# Patient Record
Sex: FEMALE | Race: WHITE | NOT HISPANIC OR LATINO | Employment: OTHER | URBAN - METROPOLITAN AREA
[De-identification: names, ages, dates, MRNs, and addresses within clinical notes are randomized per-mention and may not be internally consistent; named-entity substitution may affect disease eponyms.]

---

## 2017-01-17 RX ORDER — OMEPRAZOLE 20 MG/1
20 CAPSULE, DELAYED RELEASE ORAL DAILY
Qty: 90 CAPSULE | Refills: 2 | Status: SHIPPED | OUTPATIENT
Start: 2017-01-17 | End: 2018-01-15 | Stop reason: SDUPTHER

## 2017-02-24 ENCOUNTER — OFFICE VISIT (OUTPATIENT)
Dept: FAMILY MEDICINE | Facility: CLINIC | Age: 82
End: 2017-02-24
Payer: MEDICARE

## 2017-02-24 VITALS
DIASTOLIC BLOOD PRESSURE: 70 MMHG | OXYGEN SATURATION: 98 % | BODY MASS INDEX: 22.22 KG/M2 | WEIGHT: 146.63 LBS | RESPIRATION RATE: 18 BRPM | SYSTOLIC BLOOD PRESSURE: 124 MMHG | HEIGHT: 68 IN | HEART RATE: 68 BPM

## 2017-02-24 DIAGNOSIS — K21.9 GASTROESOPHAGEAL REFLUX DISEASE WITHOUT ESOPHAGITIS: ICD-10-CM

## 2017-02-24 DIAGNOSIS — Z86.010 HX OF COLONIC POLYPS: ICD-10-CM

## 2017-02-24 DIAGNOSIS — Z85.3 HISTORY OF BREAST CANCER: ICD-10-CM

## 2017-02-24 DIAGNOSIS — F41.9 ANXIETY: ICD-10-CM

## 2017-02-24 DIAGNOSIS — K57.30 DIVERTICULOSIS OF LARGE INTESTINE WITHOUT HEMORRHAGE: ICD-10-CM

## 2017-02-24 DIAGNOSIS — Z00.00 ENCOUNTER FOR PREVENTIVE HEALTH EXAMINATION: Primary | ICD-10-CM

## 2017-02-24 DIAGNOSIS — M72.0 DUPUYTREN'S CONTRACTURE OF RIGHT HAND: ICD-10-CM

## 2017-02-24 PROCEDURE — 99499 UNLISTED E&M SERVICE: CPT | Mod: S$GLB,,, | Performed by: NURSE PRACTITIONER

## 2017-02-24 PROCEDURE — G0439 PPPS, SUBSEQ VISIT: HCPCS | Mod: S$GLB,,, | Performed by: NURSE PRACTITIONER

## 2017-02-24 PROCEDURE — 99999 PR PBB SHADOW E&M-EST. PATIENT-LVL III: CPT | Mod: PBBFAC,,, | Performed by: NURSE PRACTITIONER

## 2017-02-24 NOTE — PROGRESS NOTES
"Ladonna St presented for a  Medicare AWV and comprehensive Health Risk Assessment today. The following components were reviewed and updated:    · Medical history  · Family History  · Social history  · Allergies and Current Medications  · Health Risk Assessment  · Health Maintenance  · Care Team     ** See Completed Assessments for Annual Wellness Visit within the encounter summary.**  Patient offers no complaints. She is seen regularly by her primary care physician, Dr. Houser.   She has regular dental and eye exams.  She lives at home with her . She is the primary caregiver for her  who is currently receiving treatment for cancer. She claims to eat a healthy diet. She is active and continues to volunteer her time also.  She is up to date with all immunizations.     The following assessments were completed:  · Living Situation  · CAGE  · Depression Screening  · Timed Get Up and Go  · Whisper Test  · Cognitive Function Screening  · Nutrition Screening  · ADL Screening  · PAQ Screening    Vitals:    02/24/17 0823   BP: 124/70   BP Location: Left arm   Patient Position: Sitting   BP Method: Manual   Pulse: 68   Resp: 18   SpO2: 98%   Weight: 66.5 kg (146 lb 9.7 oz)   Height: 5' 8" (1.727 m)     Body mass index is 22.29 kg/(m^2).  Physical Exam   Constitutional: She is oriented to person, place, and time. She appears well-developed and well-nourished. No distress.   HENT:   Head: Normocephalic and atraumatic.   Right Ear: External ear normal.   Left Ear: External ear normal.   Nose: Nose normal.   Mouth/Throat: Oropharynx is clear and moist. No oropharyngeal exudate.   Eyes: Conjunctivae and EOM are normal. Pupils are equal, round, and reactive to light. No scleral icterus.   Neck: Normal range of motion. Neck supple. No thyromegaly present.   Cardiovascular: Normal rate, regular rhythm, normal heart sounds and intact distal pulses.    Pulmonary/Chest: Effort normal and breath sounds normal. No " respiratory distress. She has no wheezes. She exhibits no tenderness.   Abdominal: Soft. Bowel sounds are normal. She exhibits no distension and no mass. There is no tenderness. There is no guarding.   Musculoskeletal: Normal range of motion. She exhibits no edema or deformity.   Lymphadenopathy:     She has no cervical adenopathy.   Neurological: She is alert and oriented to person, place, and time.   Skin: Skin is warm and dry. She is not diaphoretic. No erythema.   Psychiatric: She has a normal mood and affect. Her behavior is normal. Judgment and thought content normal.   Nursing note and vitals reviewed.        Diagnoses and health risks identified today and associated recommendations/orders:    1. Encounter for preventive health examination    2. Anxiety  Stable and controlled. Continue current treatment plan as previously prescribed with your PCP.     3. Dupuytren's contracture of right hand  Stable and controlled. Continue current treatment plan as previously prescribed with your PCP.     4. Gastroesophageal reflux disease without esophagitis  Stable and controlled. Continue current treatment plan as previously prescribed with your PCP.     5. Diverticulosis of large intestine without hemorrhage  Stable and controlled. Continue current treatment plan as previously prescribed with your PCP.     6. Hx of colonic polyps  Stable and controlled. Continue current treatment plan as previously prescribed with your PCP.     7. History of breast cancer  Stable and controlled. Continue current treatment plan as previously prescribed with your PCP.       Provided Ladonna with a 5-10 year written screening schedule and personal prevention plan. Recommendations were developed using the USPSTF age appropriate recommendations. Education, counseling, and referrals were provided as needed. After Visit Summary printed and given to patient which includes a list of additional screenings\tests needed.    Return for Regular  visit with Dr. Houser.    Elda Vidales, KELSIP

## 2017-02-24 NOTE — PATIENT INSTRUCTIONS
Counseling and Referral of Other Preventative  (Italic type indicates deductible and co-insurance are waived)    Patient Name: Ladonna St  Today's Date: 2/24/2017      SERVICE LIMITATIONS RECOMMENDATION    Vaccines    · Pneumococcal (once after 65)    · Influenza (annually)    · Hepatitis B (if medium/high risk)    · Prevnar 13      Hepatitis B medium/high risk factors:       - End-stage renal disease       - Hemophiliacs who received Factor VII or         IX concentrates       - Clients of institutions for the mentally             retarded       - Persons who live in the same house as          a HepB carrier       - Homosexual men       - Illicit injectable drug abusers     Pneumococcal: Done     Influenza: Done     Hepatitis B: N/A     Prevnar 13: Done    Mammogram (biennial age 50-74)  Annually (age 40 or over) Ordered    Pap (up to age 70 and after 70 if unknown history or abnormal study last 10 years)   N/A    Colorectal cancer screening (to age 75)    · Fecal occult blood test (annual)  · Flexible sigmoidoscopy (5y)  · Screening colonoscopy (10y)  · Barium enema  N/A    Diabetes self-management training (no USPSTF recommendations)  Requires referral by treating physician for patient with diabetes or renal disease. 10 hours of initial DSMT sessions of no less than 30 minutes each in a continuous 12-month period. 2 hours of follow-up DSMT in subsequent years. N/A    Bone mass measurements (age 65 & older, biennial)  Requires diagnosis related to osteoporosis or estrogen deficiency. Biennial benefit unless patient has history of long-term glucocorticoid Done    Glaucoma screening (no USPSTF recommendation)  Diabetes mellitus, family history   , age 50 or over    American, age 65 or over N/A    Medical nutrition therapy for diabetes or renal disease (no recommended schedule)  Requires referral by treating physician for patient with diabetes or renal disease or kidney transplant  within the past 3 years.  Can be provided in same year as diabetes self-management training (DSMT), and CMS recommends medical nutrition therapy take place after DSMT. Up to 3 hours for initial year and 2 hours in subsequent years. N/A    Cardiovascular screening blood tests (every 5 years)  · Fasting lipid panel  Order as a panel if possible Done    Diabetes screening tests (at least every 3 years, Medicare covers annually or at 6-month intervals for prediabetic patients)  · Fasting blood sugar (FBS) or glucose tolerance test (GTT)  Patient must be diagnosed with one of the following:       - Hypertension       - Dyslipidemia       - Obesity (BMI 30kg/m2)       - Previous elevated impaired FBS or GTT       ... or any two of the following:       - Overweight (BMI 25 but <30)       - Family history of diabetes       - Age 65 or older       - History of gestational diabetes or birth of baby weighing more than 9 pounds Done    Abdominal aortic aneurysm screening (once)  · Sonogram   Limited to patients who meet one of the following criteria:       - Men who are 65-75 years old and have smoked more than 100 cigarette in their lifetime       - Anyone with a family history of abdominal aortic aneurysm       - Anyone recommended for screening by the USPSTF N/A    HIV screening (annually for increased risk patients)  · HIV-1 and HIV-2 by EIA, or RIA, rapid antibody test or oral mucosa transudate  Patients must be at increased risk for HIV infection per USPSTF guidelines or pregnant. Tests covered annually for patient at increased risk or as requested by the patient. Pregnant patients may receive up to 3 tests during pregnancy.  Risks discussed, screening is not recommended    Smoking cessation counseling (up to 8 sessions per year)  Patients must be asymptomatic of tobacco-related conditions to receive as a preventative service. N/A    Subsequent annual wellness visit  At least 12 months since last AWV  Return in one year      The following information is provided to all patients.  This information is to help you find resources for any of the problems found today that may be affecting your health:                Living healthy guide: www.[facility].[place].[facility]      Understanding Diabetes: www.diabetes.org      Eating healthy: www.cdc.gov/healthyweight      CDC home safety checklist: www.cdc.gov/steadi/patient.html      Agency on Aging: www.goea.[place].[facility]      Alcoholics anonymous (AA): www.aa.org      Physical Activity: www.kash.nih.gov/bm4mssg      Tobacco use: www.quitwithusla.org

## 2017-03-03 ENCOUNTER — TELEPHONE (OUTPATIENT)
Dept: FAMILY MEDICINE | Facility: CLINIC | Age: 82
End: 2017-03-03

## 2017-03-03 NOTE — TELEPHONE ENCOUNTER
----- Message from Marilu Smiley sent at 3/3/2017 12:52 PM CST -----  Contact: daughter NICOLE CHING [phone]  Daughter Nicole called and asked for a call back to see if you will call in some medication for a fever blister to be called into [facility] on [address] call back to Cam Rivera [phone]

## 2017-03-03 NOTE — TELEPHONE ENCOUNTER
Called pt daughter Nicole, she stated her mom has a bunch of fever blisters on her lips. Her  just passed and she is stressed so she thinks this is what causing it. Requesting a prescription for it sent to preferred pharmacy because OTC abreva is not working, please advise pcp is out of clinic.

## 2017-03-07 RX ORDER — VALACYCLOVIR HYDROCHLORIDE 1 G/1
1000 TABLET, FILM COATED ORAL ONCE
Qty: 1 TABLET | Refills: 0 | Status: SHIPPED | OUTPATIENT
Start: 2017-03-07 | End: 2017-08-15

## 2017-03-07 NOTE — TELEPHONE ENCOUNTER
Please advise, message was sent to Dr. Underwood last week while PCP out on vacation. Pt daughter stated pt is having fever blisters and requesting something sent into pharmacy due to OTC abreva isn't working, please advise.

## 2017-03-29 ENCOUNTER — HOSPITAL ENCOUNTER (OUTPATIENT)
Dept: RADIOLOGY | Facility: HOSPITAL | Age: 82
Discharge: HOME | End: 2017-03-29
Attending: FAMILY MEDICINE
Payer: MEDICARE

## 2017-03-29 DIAGNOSIS — Z12.31 ENCOUNTER FOR SCREENING MAMMOGRAM FOR MALIGNANT NEOPLASM OF BREAST: ICD-10-CM

## 2017-03-29 PROCEDURE — 77063 BREAST TOMOSYNTHESIS BI: CPT | Mod: 26,,, | Performed by: RADIOLOGY

## 2017-03-29 PROCEDURE — 77067 SCR MAMMO BI INCL CAD: CPT | Mod: TC

## 2017-03-29 PROCEDURE — 77067 SCR MAMMO BI INCL CAD: CPT | Mod: 26,,, | Performed by: RADIOLOGY

## 2017-03-31 ENCOUNTER — TELEPHONE (OUTPATIENT)
Dept: FAMILY MEDICINE | Facility: CLINIC | Age: 82
End: 2017-03-31

## 2017-03-31 NOTE — TELEPHONE ENCOUNTER
Spoke to patient and gave her mammogram results. Verbalized understanding. Appointment scheduled with .

## 2017-03-31 NOTE — TELEPHONE ENCOUNTER
----- Message from Joy Oneill sent at 3/31/2017 12:37 PM CDT -----  Contact: pt   Missed call   Call back

## 2017-04-11 ENCOUNTER — TELEPHONE (OUTPATIENT)
Dept: FAMILY MEDICINE | Facility: CLINIC | Age: 82
End: 2017-04-11

## 2017-04-11 NOTE — TELEPHONE ENCOUNTER
----- Message from RT Juana sent at 4/11/2017  1:34 PM CDT -----  Contact: [phone]   Called pod, [phone], returned missed call, thanks.

## 2017-04-18 ENCOUNTER — OFFICE VISIT (OUTPATIENT)
Dept: FAMILY MEDICINE | Facility: CLINIC | Age: 82
End: 2017-04-18
Payer: MEDICARE

## 2017-04-18 VITALS
RESPIRATION RATE: 18 BRPM | WEIGHT: 142.44 LBS | HEART RATE: 68 BPM | BODY MASS INDEX: 21.59 KG/M2 | SYSTOLIC BLOOD PRESSURE: 118 MMHG | DIASTOLIC BLOOD PRESSURE: 82 MMHG | HEIGHT: 68 IN

## 2017-04-18 DIAGNOSIS — Z00.00 ROUTINE HEALTH MAINTENANCE: ICD-10-CM

## 2017-04-18 DIAGNOSIS — R73.9 HYPERGLYCEMIA: ICD-10-CM

## 2017-04-18 DIAGNOSIS — Z13.6 ENCOUNTER FOR SCREENING FOR CARDIOVASCULAR DISORDERS: ICD-10-CM

## 2017-04-18 DIAGNOSIS — F43.21 GRIEF REACTION: Primary | ICD-10-CM

## 2017-04-18 DIAGNOSIS — R53.83 FATIGUE, UNSPECIFIED TYPE: ICD-10-CM

## 2017-04-18 PROCEDURE — 99999 PR PBB SHADOW E&M-EST. PATIENT-LVL III: CPT | Mod: PBBFAC,,, | Performed by: FAMILY MEDICINE

## 2017-04-18 PROCEDURE — 99213 OFFICE O/P EST LOW 20 MIN: CPT | Mod: S$GLB,,, | Performed by: FAMILY MEDICINE

## 2017-04-18 PROCEDURE — 1126F AMNT PAIN NOTED NONE PRSNT: CPT | Mod: S$GLB,,, | Performed by: FAMILY MEDICINE

## 2017-04-18 PROCEDURE — 1159F MED LIST DOCD IN RCRD: CPT | Mod: S$GLB,,, | Performed by: FAMILY MEDICINE

## 2017-04-18 PROCEDURE — 1160F RVW MEDS BY RX/DR IN RCRD: CPT | Mod: S$GLB,,, | Performed by: FAMILY MEDICINE

## 2017-04-18 PROCEDURE — 1157F ADVNC CARE PLAN IN RCRD: CPT | Mod: S$GLB,,, | Performed by: FAMILY MEDICINE

## 2017-04-18 RX ORDER — NORTRIPTYLINE HYDROCHLORIDE 25 MG/1
25 CAPSULE ORAL NIGHTLY
Qty: 30 CAPSULE | Refills: 11 | Status: SHIPPED | OUTPATIENT
Start: 2017-04-18 | End: 2017-09-01

## 2017-04-18 NOTE — PROGRESS NOTES
Subjective:       Patient ID: Ladonna St is a 81 y.o. female    Chief Complaint: Depression (pt states her  recently passed away)    HPI  Patient with recent insomnia and grief related to the sudden passing of her .  She denies any suicidal or homicidal ideation.  She is coping well except for difficulty initiating sleep.    Review of Systems      Objective:   Physical Exam  MSE:  Decreased mood, normal affect.  Tearful.  No suicidal or homicidal ideation.  No visual or auditory hallucinations.    Assessment:       1. Grief reaction           Plan:       Grief reaction  -     nortriptyline (PAMELOR) 25 MG capsule; Take 1 capsule (25 mg total) by mouth every evening.  Dispense: 30 capsule; Refill: 11  - Recheck as needed

## 2017-04-18 NOTE — MR AVS SNAPSHOT
[facility]  [address] [facility] [address]  [facility] [address]  Phone: [phone]  Fax: [phone]                  Ladonna St   2017 11:30 AM   Office Visit    Description:  Female : 1935   Provider:  Pedro Houser MD   Department:  [facility]           Reason for Visit     Depression           Diagnoses this Visit        Comments    Grief reaction    -  Primary     Routine health maintenance         Encounter for screening for cardiovascular disorders         Hyperglycemia         Fatigue, unspecified type                To Do List           Future Appointments        Provider Department Dept Phone    8/15/2017 1:30 PM Pedro Houser MD [facility] [phone]      Goals (5 Years of Data)     None       These Medications        Disp Refills Start End    nortriptyline (PAMELOR) 25 MG capsule 30 capsule 11 2017    Take 1 capsule (25 mg total) by mouth every evening. - Oral    Pharmacy: [facility] [address] [place] [address] Ph #: [phone]         [facility]     [facility] On Call Nurse Care Line -  Assistance  Unless otherwise directed by your provider, please contact [facility], our nurse care line that is available for  assistance.     Registered nurses in the [facility] provide: appointment scheduling, clinical advisement, health education, and other advisory services.  Call: [phone] (toll free)               Medications           Message regarding Medications     Verify the changes and/or additions to your medication regime listed below are the same as discussed with your clinician today.  If any of these changes or additions are incorrect, please notify your healthcare provider.        START taking these NEW medications        Refills    nortriptyline (PAMELOR) 25 MG capsule 11    Sig: Take 1 capsule (25 mg total)  "by mouth every evening.    Class: Normal    Route: Oral      STOP taking these medications     amitriptyline (ELAVIL) 10 MG tablet TAKE ONE TABLET BY MOUTH ONCE DAILY           Verify that the below list of medications is an accurate representation of the medications you are currently taking.  If none reported, the list may be blank. If incorrect, please contact your healthcare provider. Carry this list with you in case of emergency.           Current Medications     aspirin 325 MG tablet Take 325 mg by mouth once daily.    melatonin 1 mg Tab Take 3 mg by mouth. 1 Tablet Oral Every evening    multivitamin (ONE DAILY MULTIVITAMIN) per tablet Take 1 tablet by mouth once daily.    omeprazole (PRILOSEC) 20 MG capsule Take 1 capsule (20 mg total) by mouth once daily.    nortriptyline (PAMELOR) 25 MG capsule Take 1 capsule (25 mg total) by mouth every evening.    valacyclovir (VALTREX) 1000 MG tablet Take 1 tablet (1,000 mg total) by mouth once.           Clinical Reference Information           Your Vitals Were     BP Pulse Resp Height Weight BMI    118/82 (BP Location: Left arm, Patient Position: Sitting, BP Method: Manual) 68 18 5' 8" (1.727 m) 64.6 kg (142 lb 6.7 oz) 21.65 kg/m2      Blood Pressure          Most Recent Value    BP  118/82      Allergies as of 4/18/2017     Benadryl Allergy Decongestant    Sulfamethoxazole-trimethoprim      Immunizations Administered on Date of Encounter - 4/18/2017     None      Orders Placed During Today's Visit     Future Labs/Procedures Expected by Expires    Comprehensive metabolic panel  4/18/2017 7/17/2017    Hemoglobin A1c  4/18/2017 6/17/2018    Lipid panel  4/18/2017 7/17/2017    TSH  4/18/2017 7/17/2017      Language Assistance Services     ATTENTION: Language assistance services are available, free of charge. Please call [phone].      ATENCIÓN: Si habla español, tiene a bowman disposición servicios gratuitos de asistencia lingüística. Llame al [phone].     CHÚ Ý: " N?u b?n nói Ti?ng Vi?t, có các d?ch v? h? tr? ngôn ng? mi?n phí dành cho b?n. G?i s? [phone].         [facility] complies with applicable Federal civil rights laws and does not discriminate on the basis of race, color, national origin, age, disability, or sex.

## 2017-05-19 ENCOUNTER — LAB VISIT (OUTPATIENT)
Dept: LAB | Facility: HOSPITAL | Age: 82
End: 2017-05-19
Attending: FAMILY MEDICINE
Payer: MEDICARE

## 2017-05-19 DIAGNOSIS — R73.9 HYPERGLYCEMIA: ICD-10-CM

## 2017-05-19 DIAGNOSIS — R53.83 FATIGUE, UNSPECIFIED TYPE: ICD-10-CM

## 2017-05-19 DIAGNOSIS — Z13.6 ENCOUNTER FOR SCREENING FOR CARDIOVASCULAR DISORDERS: ICD-10-CM

## 2017-05-19 DIAGNOSIS — Z00.00 ROUTINE HEALTH MAINTENANCE: ICD-10-CM

## 2017-05-19 LAB
ALBUMIN SERPL BCP-MCNC: 3.7 G/DL
ALP SERPL-CCNC: 59 U/L
ALT SERPL W/O P-5'-P-CCNC: 12 U/L
ANION GAP SERPL CALC-SCNC: 9 MMOL/L
AST SERPL-CCNC: 23 U/L
BILIRUB SERPL-MCNC: 0.5 MG/DL
BUN SERPL-MCNC: 16 MG/DL
CALCIUM SERPL-MCNC: 9.7 MG/DL
CHLORIDE SERPL-SCNC: 106 MMOL/L
CHOLEST/HDLC SERPL: 2.9 {RATIO}
CO2 SERPL-SCNC: 27 MMOL/L
CREAT SERPL-MCNC: 0.9 MG/DL
EST. GFR  (AFRICAN AMERICAN): >60 ML/MIN/1.73 M^2
EST. GFR  (NON AFRICAN AMERICAN): >60 ML/MIN/1.73 M^2
GLUCOSE SERPL-MCNC: 93 MG/DL
HDL/CHOLESTEROL RATIO: 34.6 %
HDLC SERPL-MCNC: 191 MG/DL
HDLC SERPL-MCNC: 66 MG/DL
LDLC SERPL CALC-MCNC: 110.2 MG/DL
NONHDLC SERPL-MCNC: 125 MG/DL
POTASSIUM SERPL-SCNC: 4.5 MMOL/L
PROT SERPL-MCNC: 7.4 G/DL
SODIUM SERPL-SCNC: 142 MMOL/L
TRIGL SERPL-MCNC: 74 MG/DL
TSH SERPL DL<=0.005 MIU/L-ACNC: 1.42 UIU/ML

## 2017-05-19 PROCEDURE — 80061 LIPID PANEL: CPT

## 2017-05-19 PROCEDURE — 84443 ASSAY THYROID STIM HORMONE: CPT

## 2017-05-19 PROCEDURE — 80053 COMPREHEN METABOLIC PANEL: CPT

## 2017-05-19 PROCEDURE — 36415 COLL VENOUS BLD VENIPUNCTURE: CPT | Mod: PO

## 2017-05-19 PROCEDURE — 83036 HEMOGLOBIN GLYCOSYLATED A1C: CPT

## 2017-05-20 LAB
ESTIMATED AVG GLUCOSE: 123 MG/DL
HBA1C MFR BLD HPLC: 5.9 %

## 2017-05-29 PROBLEM — Z00.00 ENCOUNTER FOR PREVENTIVE HEALTH EXAMINATION: Status: RESOLVED | Noted: 2017-02-24 | Resolved: 2017-05-29

## 2017-08-15 ENCOUNTER — OFFICE VISIT (OUTPATIENT)
Dept: FAMILY MEDICINE | Facility: CLINIC | Age: 82
End: 2017-08-15
Payer: MEDICARE

## 2017-08-15 VITALS
BODY MASS INDEX: 21.05 KG/M2 | HEIGHT: 68 IN | HEART RATE: 66 BPM | RESPIRATION RATE: 18 BRPM | SYSTOLIC BLOOD PRESSURE: 116 MMHG | WEIGHT: 138.88 LBS | DIASTOLIC BLOOD PRESSURE: 70 MMHG

## 2017-08-15 DIAGNOSIS — F43.21 GRIEF REACTION: Primary | ICD-10-CM

## 2017-08-15 PROCEDURE — 1159F MED LIST DOCD IN RCRD: CPT | Mod: S$GLB,,, | Performed by: FAMILY MEDICINE

## 2017-08-15 PROCEDURE — 99213 OFFICE O/P EST LOW 20 MIN: CPT | Mod: S$GLB,,, | Performed by: FAMILY MEDICINE

## 2017-08-15 PROCEDURE — 1126F AMNT PAIN NOTED NONE PRSNT: CPT | Mod: S$GLB,,, | Performed by: FAMILY MEDICINE

## 2017-08-15 PROCEDURE — 3008F BODY MASS INDEX DOCD: CPT | Mod: S$GLB,,, | Performed by: FAMILY MEDICINE

## 2017-08-15 PROCEDURE — 99999 PR PBB SHADOW E&M-EST. PATIENT-LVL III: CPT | Mod: PBBFAC,,, | Performed by: FAMILY MEDICINE

## 2017-08-15 NOTE — PROGRESS NOTES
Subjective:       Patient ID: Ladonna St is a 81 y.o. female    Chief Complaint: Grief reaction (Follow-up 3 mos)    HPI  Here today for review of recent grief related to her 's death.  She was experiencing insomnia and anxiety and started on Pamelor.    Review of Systems      Objective:   Physical Exam   Constitutional: She is oriented to person, place, and time. She appears well-developed and well-nourished.   Neurological: She is alert and oriented to person, place, and time.   Vitals reviewed.  MSE:  Normal mood, normal affect.  No suicidal or homicidal ideation.  No visual or auditory hallucinations.       Assessment:       1. Grief reaction       Plan:       Grief reaction  - Continue grief group and counseling  - Continue current therapy

## 2017-09-01 ENCOUNTER — OFFICE VISIT (OUTPATIENT)
Dept: FAMILY MEDICINE | Facility: CLINIC | Age: 82
End: 2017-09-01
Payer: MEDICARE

## 2017-09-01 VITALS
OXYGEN SATURATION: 97 % | SYSTOLIC BLOOD PRESSURE: 134 MMHG | HEART RATE: 78 BPM | WEIGHT: 137.81 LBS | RESPIRATION RATE: 18 BRPM | HEIGHT: 68 IN | DIASTOLIC BLOOD PRESSURE: 92 MMHG | BODY MASS INDEX: 20.89 KG/M2

## 2017-09-01 DIAGNOSIS — R00.2 PALPITATIONS: Primary | ICD-10-CM

## 2017-09-01 DIAGNOSIS — K59.00 CONSTIPATION, UNSPECIFIED CONSTIPATION TYPE: ICD-10-CM

## 2017-09-01 DIAGNOSIS — F43.21 GRIEF REACTION: ICD-10-CM

## 2017-09-01 PROCEDURE — 99999 PR PBB SHADOW E&M-EST. PATIENT-LVL III: CPT | Mod: PBBFAC,,, | Performed by: FAMILY MEDICINE

## 2017-09-01 PROCEDURE — 93010 ELECTROCARDIOGRAM REPORT: CPT | Mod: S$GLB,,, | Performed by: INTERNAL MEDICINE

## 2017-09-01 PROCEDURE — 1126F AMNT PAIN NOTED NONE PRSNT: CPT | Mod: S$GLB,,, | Performed by: FAMILY MEDICINE

## 2017-09-01 PROCEDURE — 1159F MED LIST DOCD IN RCRD: CPT | Mod: S$GLB,,, | Performed by: FAMILY MEDICINE

## 2017-09-01 PROCEDURE — 93005 ELECTROCARDIOGRAM TRACING: CPT | Mod: S$GLB,,, | Performed by: FAMILY MEDICINE

## 2017-09-01 PROCEDURE — 99214 OFFICE O/P EST MOD 30 MIN: CPT | Mod: S$GLB,,, | Performed by: FAMILY MEDICINE

## 2017-09-01 PROCEDURE — 3008F BODY MASS INDEX DOCD: CPT | Mod: S$GLB,,, | Performed by: FAMILY MEDICINE

## 2017-09-01 RX ORDER — NORTRIPTYLINE HYDROCHLORIDE 10 MG/1
10 CAPSULE ORAL NIGHTLY
Qty: 30 CAPSULE | Refills: 11 | Status: SHIPPED | OUTPATIENT
Start: 2017-09-01 | End: 2017-11-09 | Stop reason: ALTCHOICE

## 2017-09-01 NOTE — PROGRESS NOTES
"Subjective:       Patient ID: Ladonna St is a 81 y.o. female.    Chief Complaint: Constipation (hard stool ); Medication Problem (chest "fluttering" since starting Pamelor); and Anxiety    HPI     Recall that patient was switched from elavil 10 mg to pamelor 25 mg in 4/2017 due to her bereavement of her .  Reports that the pamelor has helped with her grieving but she is concerned about the adverse effects of palpitations and constipation.  Reports 1-3 episodes of palpitations 5-6 days per week.  Reports bm every 3-4 days and occasionally hard.        Review of Systems      Review of Systems   Constitutional: Negative for fever and chills.   HENT: Negative for hearing loss and neck stiffness.    Eyes: Negative for redness and itching.   Respiratory: Negative for cough and choking.    Cardiovascular: Negative for chest pain and leg swelling.  Abdomen: Negative for abdominal pain and blood in stool.   Genitourinary: Negative for dysuria and flank pain.   Musculoskeletal: Negative for back pain and gait problem.   Neurological: Negative for light-headedness and headaches.   Hematological: Negative for adenopathy.   Psychiatric/Behavioral: Negative for behavioral problems.     Objective:      Physical Exam   Constitutional: She appears well-developed.   HENT:   Head: Normocephalic and atraumatic.   Eyes: Conjunctivae are normal. Pupils are equal, round, and reactive to light.   Neck: Normal range of motion.   Cardiovascular: Normal rate and regular rhythm.    No murmur heard.  Pulmonary/Chest: Effort normal and breath sounds normal. She has no wheezes.   Lymphadenopathy:     She has no cervical adenopathy.       Assessment:       1. Palpitations    2. Constipation, unspecified constipation type    3. Grief reaction        Plan:       Palpitations  -     IN OFFICE EKG 12-LEAD (to Muse)-nsr at 70 bpm    Constipation, unspecified constipation type    Grief reaction    Other orders  -     nortriptyline " (PAMELOR) 10 MG capsule; Take 1 capsule (10 mg total) by mouth every evening.  Dispense: 30 capsule; Refill: 11      Plan:  Decrease pamelor from the 25 mg dosage to the 10 mg dosage. This may decrease or resolve her palpitations and constipation.   If her grief gets worse, then pt will need stop the pamelor 10 mg and be switched to another antidepressant in the ssri class.    Recommend increasing her fibre intake and add metamucil.        Total time spent with patient was 25 minutes with more than half the time spent in direct consultation with the patient in regards to our treatment/plan.

## 2017-11-07 ENCOUNTER — TELEPHONE (OUTPATIENT)
Dept: FAMILY MEDICINE | Facility: CLINIC | Age: 82
End: 2017-11-07

## 2017-11-07 NOTE — TELEPHONE ENCOUNTER
----- Message from Taylor Camacho sent at 11/7/2017 11:42 AM CST -----  Contact: Ladonna  Patient is calling as tried taking Rx nortriptyline (PAMELOR) 10 MG capsule which was reduced from the 25 mg tablet originally given as was changed from Rx Amitriptyline. States has had side effects of constipation and bloating. Stopped taking Rx Nortriptyline and had Rx Amitriptyline and began taking and has had no side effects aforementioned. Asking to be put back on Rx Amitriptyline 10 mg and needs Rx as leaving town in three days.     [facility] [address] [place] [address] & [address] [address]  Phone: [phone] Fax: [phone]    If any questions please call [phone]. Thanks!

## 2017-11-08 RX ORDER — AMITRIPTYLINE HYDROCHLORIDE 10 MG/1
10 TABLET, FILM COATED ORAL NIGHTLY PRN
Qty: 90 TABLET | Refills: 3 | Status: SHIPPED | OUTPATIENT
Start: 2017-11-08 | End: 2017-11-09 | Stop reason: ALTCHOICE

## 2017-11-09 RX ORDER — AMITRIPTYLINE HYDROCHLORIDE 10 MG/1
TABLET, FILM COATED ORAL
Qty: 90 TABLET | Refills: 3 | Status: SHIPPED | OUTPATIENT
Start: 2017-11-09 | End: 2018-12-17 | Stop reason: SDUPTHER

## 2018-01-15 RX ORDER — OMEPRAZOLE 20 MG/1
CAPSULE, DELAYED RELEASE ORAL
Qty: 90 CAPSULE | Refills: 2 | Status: SHIPPED | OUTPATIENT
Start: 2018-01-15 | End: 2018-08-31 | Stop reason: SDUPTHER

## 2018-02-06 ENCOUNTER — PES CALL (OUTPATIENT)
Dept: ADMINISTRATIVE | Facility: CLINIC | Age: 83
End: 2018-02-06

## 2018-05-10 ENCOUNTER — TELEPHONE (OUTPATIENT)
Dept: FAMILY MEDICINE | Facility: CLINIC | Age: 83
End: 2018-05-10

## 2018-05-10 NOTE — TELEPHONE ENCOUNTER
Called to reschedule patient appt on 5-15-18 due to Dr. Houser will be out of clinic for that week. Scheduled patient for an appt on 5-25-18 at 10:45 am. Patient expressed understanding.

## 2018-05-15 ENCOUNTER — HOSPITAL ENCOUNTER (OUTPATIENT)
Dept: RADIOLOGY | Facility: HOSPITAL | Age: 83
Discharge: HOME | End: 2018-05-15
Attending: FAMILY MEDICINE
Payer: MEDICARE

## 2018-05-15 ENCOUNTER — OFFICE VISIT (OUTPATIENT)
Dept: FAMILY MEDICINE | Facility: CLINIC | Age: 83
End: 2018-05-15
Payer: MEDICARE

## 2018-05-15 VITALS
DIASTOLIC BLOOD PRESSURE: 80 MMHG | BODY MASS INDEX: 21.28 KG/M2 | WEIGHT: 140.44 LBS | OXYGEN SATURATION: 97 % | HEIGHT: 68 IN | SYSTOLIC BLOOD PRESSURE: 124 MMHG | HEART RATE: 71 BPM

## 2018-05-15 DIAGNOSIS — F41.9 ANXIETY: ICD-10-CM

## 2018-05-15 DIAGNOSIS — Z00.00 ENCOUNTER FOR PREVENTIVE HEALTH EXAMINATION: Primary | ICD-10-CM

## 2018-05-15 DIAGNOSIS — K21.9 GASTROESOPHAGEAL REFLUX DISEASE WITHOUT ESOPHAGITIS: ICD-10-CM

## 2018-05-15 DIAGNOSIS — Z12.31 VISIT FOR SCREENING MAMMOGRAM: ICD-10-CM

## 2018-05-15 PROCEDURE — G0439 PPPS, SUBSEQ VISIT: HCPCS | Mod: S$GLB,,, | Performed by: NURSE PRACTITIONER

## 2018-05-15 PROCEDURE — 77067 SCR MAMMO BI INCL CAD: CPT | Mod: 26,,, | Performed by: RADIOLOGY

## 2018-05-15 PROCEDURE — 77063 BREAST TOMOSYNTHESIS BI: CPT | Mod: 26,,, | Performed by: RADIOLOGY

## 2018-05-15 PROCEDURE — 77067 SCR MAMMO BI INCL CAD: CPT | Mod: TC,PO

## 2018-05-15 PROCEDURE — 99499 UNLISTED E&M SERVICE: CPT | Mod: S$PBB,,, | Performed by: NURSE PRACTITIONER

## 2018-05-15 PROCEDURE — 99999 PR PBB SHADOW E&M-EST. PATIENT-LVL IV: CPT | Mod: PBBFAC,,, | Performed by: NURSE PRACTITIONER

## 2018-05-15 NOTE — PATIENT INSTRUCTIONS
Counseling and Referral of Other Preventative  (Italic type indicates deductible and co-insurance are waived)    Patient Name: Ladonna St  Today's Date: 5/15/2018    Health Maintenance       Date Due Completion Date    Pneumococcal (65+) (2 of 2 - PPSV23) 10/12/2017 10/12/2016    Influenza Vaccine 08/01/2018 9/28/2017 (Done)    Override on 9/28/2017: Done    [facility] on 10/12/2016: [facility] ([facility])    Override on 10/14/2015: Done    DEXA SCAN 10/20/2018 10/20/2015 ([facility])    Override on 10/20/2015: Not Clinically Appropriate    Lipid Panel 05/19/2022 5/19/2017    TETANUS VACCINE 06/22/2026 6/22/2016 (Declined)    Override on 6/22/2016: Declined        No orders of the defined types were placed in this encounter.    The following information is provided to all patients.  This information is to help you find resources for any of the problems found today that may be affecting your health:                Living healthy guide: www.[facility].[place].gov      Understanding Diabetes: www.diabetes.org      Eating healthy: www.cdc.gov/healthyweight      CDC home safety checklist: www.cdc.gov/steadi/patient.html      Agency on Aging: www.goea.[place].gov      Alcoholics anonymous (AA): www.aa.org      Physical Activity: www.kash.nih.gov/sa8zdlr      Tobacco use: www.quitwithusla.org

## 2018-05-15 NOTE — PROGRESS NOTES
"Ladonna St presented for a  Medicare AWV and comprehensive Health Risk Assessment today. The following components were reviewed and updated:    · Medical history  · Family History  · Social history  · Allergies and Current Medications  · Health Risk Assessment  · Health Maintenance  · Care Team     Review of Systems   Constitutional: Negative for chills, fever, malaise/fatigue and weight loss.   Respiratory: Negative for cough, shortness of breath and wheezing.    Cardiovascular: Negative for chest pain.   Gastrointestinal: Negative for abdominal pain, blood in stool, constipation, diarrhea, nausea and vomiting.   Skin: Negative for rash.   Neurological: Negative for dizziness, weakness and headaches.     ** See Completed Assessments for Annual Wellness Visit within the encounter summary.**     The following assessments were completed:  · Living Situation  · CAGE  · Depression Screening  · Timed Get Up and Go  · Whisper Test  · Cognitive Function Screening        · Nutrition Screening  · ADL Screening  · PAQ Screening    Vitals:    05/15/18 0955   BP: 124/80   BP Location: Left arm   Patient Position: Sitting   BP Method: Medium (Manual)   Pulse: 71   SpO2: 97%   Weight: 63.7 kg (140 lb 6.9 oz)   Height: 5' 8" (1.727 m)     Body mass index is 21.35 kg/m².  Physical Exam   Constitutional: She is oriented to person, place, and time. She appears well-nourished.   Cardiovascular: Normal rate, regular rhythm, normal heart sounds and intact distal pulses.    Pulmonary/Chest: Effort normal and breath sounds normal.   Neurological: She is alert and oriented to person, place, and time.   Skin: Skin is warm and dry. No rash noted.   Vitals reviewed.        Diagnoses and health risks identified today and associated recommendations/orders:    1. Encounter for preventive health examination  Reviewed and discussed health maintenance.    Written rx given to patient to update PPSV -23  Today    2. Anxiety  Stable- continue " current treatment and follow up routinely with PCP     3. Gastroesophageal reflux disease without esophagitis  Stable- continue current treatment and follow up routinely with PCP     I offered to discuss end of life issues, including information on how to make advance directives that the patient could use to name someone who would make medical decisions on their behalf if they became too ill to make themselves.    ___Patient declined  _X_Patient is interested, I provided paper work and offered to discuss.    Provided Ladonna with a 5-10 year written screening schedule and personal prevention plan. Recommendations were developed using the USPSTF age appropriate recommendations. Education, counseling, and referrals were provided as needed. After Visit Summary printed and given to patient which includes a list of additional screenings\tests needed.    Mary Wilkerson, NP

## 2018-06-26 ENCOUNTER — OFFICE VISIT (OUTPATIENT)
Dept: FAMILY MEDICINE | Facility: CLINIC | Age: 83
End: 2018-06-26
Payer: MEDICARE

## 2018-06-26 VITALS
HEIGHT: 68 IN | SYSTOLIC BLOOD PRESSURE: 128 MMHG | DIASTOLIC BLOOD PRESSURE: 72 MMHG | RESPIRATION RATE: 14 BRPM | HEART RATE: 68 BPM | BODY MASS INDEX: 21.62 KG/M2 | WEIGHT: 142.63 LBS

## 2018-06-26 DIAGNOSIS — Z00.00 ROUTINE HEALTH MAINTENANCE: Primary | ICD-10-CM

## 2018-06-26 DIAGNOSIS — Z13.6 ENCOUNTER FOR SCREENING FOR CARDIOVASCULAR DISORDERS: ICD-10-CM

## 2018-06-26 PROCEDURE — 99999 PR PBB SHADOW E&M-EST. PATIENT-LVL III: CPT | Mod: PBBFAC,,, | Performed by: FAMILY MEDICINE

## 2018-06-26 PROCEDURE — 99397 PER PM REEVAL EST PAT 65+ YR: CPT | Mod: S$GLB,,, | Performed by: FAMILY MEDICINE

## 2018-06-26 NOTE — PROGRESS NOTES
HPI  Ladonna St is a 82 y.o. female with multiple medical diagnoses as listed in the medical history and problem list that presents for Follow-up  .      HPI  Here today for routine health maintenance.    PAST MEDICAL HISTORY:  Past Medical History:   Diagnosis Date    Anxiety     Breast cancer 1986    mastectomy right    GERD (gastroesophageal reflux disease)     Osteoporosis, unspecified        PAST SURGICAL HISTORY:  Past Surgical History:   Procedure Laterality Date    BREAST BIOPSY Left 1987    benign    BREAST SURGERY Right 1989    implant repair after rupture    BREAST SURGERY Right     implant repair after rupture    CATARACT EXTRACTION W/  INTRAOCULAR LENS IMPLANT      bilateral    COLONOSCOPY  2011    polyp    dupuytrens      EYE SURGERY Bilateral     cataracts    MASTECTOMY Right 1986    TONSILLECTOMY         SOCIAL HISTORY:  Social History     Social History    Marital status:      Spouse name: N/A    Number of children: N/A    Years of education: N/A     Occupational History    Not on file.     Social History Main Topics    Smoking status: Former Smoker     Packs/day: 0.10     Years: 24.00     Types: Cigarettes     Quit date: 6/11/1983    Smokeless tobacco: Never Used      Comment: quit 30 years ago    Alcohol use 0.6 oz/week     1 Glasses of wine per week      Comment: per day    Drug use: No    Sexual activity: Not Currently     Other Topics Concern    Not on file     Social History Narrative    No narrative on file       FAMILY HISTORY:  Family History   Problem Relation Age of Onset    Cancer Maternal Aunt         colon    Breast cancer Maternal Aunt 50    Cancer Paternal Uncle         colon    Arthritis Mother     Parkinsonism Father     No Known Problems Sister     Henoch-Schonlein purpura Daughter     No Known Problems Son     No Known Problems Sister     Cancer Maternal Aunt         breast       ALLERGIES AND MEDICATIONS: updated and  reviewed.  Review of patient's allergies indicates:   Allergen Reactions    Benadryl allergy decongestant      Other reaction(s): hyperactive    Sulfamethoxazole-trimethoprim      Other reaction(s): Rash     Current Outpatient Prescriptions   Medication Sig Dispense Refill    amitriptyline (ELAVIL) 10 MG tablet TAKE ONE TABLET BY MOUTH ONCE DAILY 90 tablet 3    aspirin 325 MG tablet Take 325 mg by mouth once daily.      melatonin 1 mg Tab Take 3 mg by mouth. 1 Tablet Oral Every evening      multivitamin (ONE DAILY MULTIVITAMIN) per tablet Take 1 tablet by mouth once daily.      omeprazole (PRILOSEC) 20 MG capsule TAKE 1 CAPSULE(20 MG) BY MOUTH EVERY DAY 90 capsule 2    pneumococcal vaccine (PNEUMOVAX 23) 25 mcg/0.5 mL injection Inject 0.5 mLs into the muscle. 0.5 mL 0     No current facility-administered medications for this visit.        ROS  Review of Systems   Constitutional: Negative for fatigue, fever and unexpected weight change.   HENT: Negative for congestion, hearing loss, rhinorrhea and sore throat.    Eyes: Negative for visual disturbance.   Respiratory: Negative for cough, chest tightness, shortness of breath and wheezing.    Cardiovascular: Negative for chest pain, palpitations and leg swelling.   Gastrointestinal: Negative for abdominal distention, abdominal pain, blood in stool, constipation, diarrhea, nausea and vomiting.   Genitourinary: Negative for difficulty urinating, dysuria, frequency, hematuria, menstrual problem, pelvic pain, urgency and vaginal bleeding.   Musculoskeletal: Negative for back pain, joint swelling and neck pain.   Skin: Negative for rash.   Neurological: Negative for dizziness, tremors, weakness, light-headedness, numbness and headaches.   Psychiatric/Behavioral: Negative for confusion, dysphoric mood (attending grief group at her [facility]) and sleep disturbance. The patient is not nervous/anxious.        Physical Exam  Vitals:    06/26/18 1442   BP: 128/72   Pulse: 68  "  Resp: 14    Body mass index is 21.69 kg/m².  Weight: 64.7 kg (142 lb 10.2 oz)   Height: 5' 8" (172.7 cm)     Physical Exam   Constitutional: She is oriented to person, place, and time. She appears well-developed and well-nourished.   HENT:   Head: Normocephalic and atraumatic.   Right Ear: External ear normal.   Left Ear: External ear normal.   Nose: Nose normal.   Mouth/Throat: Oropharynx is clear and moist.   Eyes: Conjunctivae and EOM are normal. Pupils are equal, round, and reactive to light. No scleral icterus.   Neck: Normal range of motion. Neck supple. No JVD present. No thyromegaly present.   Cardiovascular: Normal rate, regular rhythm and normal heart sounds.  Exam reveals no gallop and no friction rub.    No murmur heard.  Pulmonary/Chest: Effort normal and breath sounds normal. She has no wheezes. She has no rales.   Abdominal: Soft. Bowel sounds are normal. She exhibits no distension and no mass. There is no tenderness. There is no rebound and no guarding.   Musculoskeletal: Normal range of motion. She exhibits no edema.   Lymphadenopathy:     She has no cervical adenopathy.   Neurological: She is alert and oriented to person, place, and time. She has normal strength. No cranial nerve deficit or sensory deficit.   Skin: Skin is warm and dry. No rash noted.   Vitals reviewed.      Health Maintenance       Date Due Completion Date    Influenza Vaccine 08/01/2018 9/28/2017 (Done)    Override on 9/28/2017: Done    Override on 10/12/2016: Done ([facility])    Override on 10/14/2015: Done    DEXA SCAN 10/20/2018 10/20/2015 ([facility]NA)    Override on 10/20/2015: Not Clinically Appropriate    Lipid Panel 05/19/2022 5/19/2017    TETANUS VACCINE 06/22/2026 6/22/2016 (Declined)    Override on 6/22/2016: Declined          Assessment & Plan    Routine health maintenance  - Health maintenance reviewed  - Diet and exercise education.    Encounter for screening for cardiovascular disorders  -     Comprehensive " metabolic panel; Future; Expected date: 06/26/2018  -     Lipid panel; Future; Expected date: 06/26/2018        Follow-up in about 1 year (around 6/26/2019).

## 2018-07-02 ENCOUNTER — LAB VISIT (OUTPATIENT)
Dept: LAB | Facility: HOSPITAL | Age: 83
End: 2018-07-02
Attending: FAMILY MEDICINE
Payer: MEDICARE

## 2018-07-02 DIAGNOSIS — Z13.6 ENCOUNTER FOR SCREENING FOR CARDIOVASCULAR DISORDERS: ICD-10-CM

## 2018-07-02 LAB
ALBUMIN SERPL BCP-MCNC: 3.8 G/DL
ALP SERPL-CCNC: 60 U/L
ALT SERPL W/O P-5'-P-CCNC: 12 U/L
ANION GAP SERPL CALC-SCNC: 4 MMOL/L
AST SERPL-CCNC: 24 U/L
BILIRUB SERPL-MCNC: 0.6 MG/DL
BUN SERPL-MCNC: 16 MG/DL
CALCIUM SERPL-MCNC: 9.5 MG/DL
CHLORIDE SERPL-SCNC: 105 MMOL/L
CHOLEST SERPL-MCNC: 180 MG/DL
CHOLEST/HDLC SERPL: 2.6 {RATIO}
CO2 SERPL-SCNC: 29 MMOL/L
CREAT SERPL-MCNC: 0.8 MG/DL
EST. GFR  (AFRICAN AMERICAN): >60 ML/MIN/1.73 M^2
EST. GFR  (NON AFRICAN AMERICAN): >60 ML/MIN/1.73 M^2
GLUCOSE SERPL-MCNC: 88 MG/DL
HDLC SERPL-MCNC: 68 MG/DL
HDLC SERPL: 37.8 %
LDLC SERPL CALC-MCNC: 98.2 MG/DL
NONHDLC SERPL-MCNC: 112 MG/DL
POTASSIUM SERPL-SCNC: 4 MMOL/L
PROT SERPL-MCNC: 7.4 G/DL
SODIUM SERPL-SCNC: 138 MMOL/L
TRIGL SERPL-MCNC: 69 MG/DL

## 2018-07-02 PROCEDURE — 36415 COLL VENOUS BLD VENIPUNCTURE: CPT | Mod: PO

## 2018-07-02 PROCEDURE — 80061 LIPID PANEL: CPT

## 2018-07-02 PROCEDURE — 80053 COMPREHEN METABOLIC PANEL: CPT

## 2018-08-31 RX ORDER — OMEPRAZOLE 20 MG/1
CAPSULE, DELAYED RELEASE ORAL
Qty: 90 CAPSULE | Refills: 3 | Status: SHIPPED | OUTPATIENT
Start: 2018-08-31 | End: 2019-10-13 | Stop reason: SDUPTHER

## 2018-10-31 ENCOUNTER — OFFICE VISIT (OUTPATIENT)
Dept: FAMILY MEDICINE | Facility: CLINIC | Age: 83
End: 2018-10-31
Payer: MEDICARE

## 2018-10-31 VITALS
BODY MASS INDEX: 21.65 KG/M2 | SYSTOLIC BLOOD PRESSURE: 136 MMHG | DIASTOLIC BLOOD PRESSURE: 76 MMHG | WEIGHT: 142.88 LBS | HEART RATE: 64 BPM | HEIGHT: 68 IN

## 2018-10-31 DIAGNOSIS — M70.62 TROCHANTERIC BURSITIS OF LEFT HIP: Primary | ICD-10-CM

## 2018-10-31 PROCEDURE — 1101F PT FALLS ASSESS-DOCD LE1/YR: CPT | Mod: CPTII,HCWC,, | Performed by: FAMILY MEDICINE

## 2018-10-31 PROCEDURE — 99213 OFFICE O/P EST LOW 20 MIN: CPT | Mod: S$PBB,HCWC,, | Performed by: FAMILY MEDICINE

## 2018-10-31 PROCEDURE — 99213 OFFICE O/P EST LOW 20 MIN: CPT | Mod: PBBFAC,HCWC,PO | Performed by: FAMILY MEDICINE

## 2018-10-31 PROCEDURE — 99999 PR PBB SHADOW E&M-EST. PATIENT-LVL III: CPT | Mod: PBBFAC,HCWC,, | Performed by: FAMILY MEDICINE

## 2018-10-31 NOTE — PROGRESS NOTES
Subjective:       Patient ID: Ladonna St is a 82 y.o. female    Chief Complaint: Hip Pain (L hip)    HPI  Here today to discuss pain in the left hip.  No new trauma.  Present for the past week. Worsened with increased activity.  Worse with certain positions of sleep.  Treatment with occasional ASA.    Review of Systems     Objective:   Physical Exam     Assessment:       1. Trochanteric bursitis of left hip  Ambulatory Referral to Physical/Occupational Therapy        Plan:       Trochanteric bursitis of left hip  -     Ambulatory Referral to Physical/Occupational Therapy  - Orthopedic if persists

## 2018-11-06 ENCOUNTER — CLINICAL SUPPORT (OUTPATIENT)
Dept: REHABILITATION | Facility: HOSPITAL | Age: 83
End: 2018-11-06
Attending: FAMILY MEDICINE
Payer: MEDICARE

## 2018-11-06 DIAGNOSIS — M25.652 DECREASED RANGE OF LEFT HIP MOVEMENT: ICD-10-CM

## 2018-11-06 DIAGNOSIS — M25.552 LEFT HIP PAIN: ICD-10-CM

## 2018-11-06 DIAGNOSIS — M62.81 MUSCLE WEAKNESS OF LOWER EXTREMITY: ICD-10-CM

## 2018-11-06 PROCEDURE — 97110 THERAPEUTIC EXERCISES: CPT | Mod: HCWC,PN

## 2018-11-06 PROCEDURE — G8979 MOBILITY GOAL STATUS: HCPCS | Mod: CJ,HCWC,PN

## 2018-11-06 PROCEDURE — G8978 MOBILITY CURRENT STATUS: HCPCS | Mod: CK,HCWC,PN

## 2018-11-06 PROCEDURE — 97161 PT EVAL LOW COMPLEX 20 MIN: CPT | Mod: HCWC,PN

## 2018-11-06 NOTE — PLAN OF CARE
OUTPATIENT PHYSICAL THERAPY  PHYSICAL THERAPY EVALUATION    Name: Ladonna St  Clinic Number: [id number]    Evaluation Date: 11/06/2018  Visit #: 1 / 30  Authorization period Expiration: 10/31/2019  Plan of Care Expiration: 02/06/2018  Precautions: standard     Diagnosis:   Encounter Diagnoses   Name Primary?    Muscle weakness of lower extremity     Decreased range of left hip movement     Left hip pain      Physician: Pedro Houser MD  Treatment Orders: PT Eval and Treat  Past Medical History:   Diagnosis Date    Anxiety     Breast cancer 1986    mastectomy right    GERD (gastroesophageal reflux disease)     Osteoporosis, unspecified      Current Outpatient Medications   Medication Sig    amitriptyline (ELAVIL) 10 MG tablet TAKE ONE TABLET BY MOUTH ONCE DAILY    aspirin 325 MG tablet Take 325 mg by mouth once daily.    melatonin 1 mg Tab Take 3 mg by mouth. 1 Tablet Oral Every evening    multivitamin (ONE DAILY MULTIVITAMIN) per tablet Take 1 tablet by mouth once daily.    omeprazole (PRILOSEC) 20 MG capsule TAKE 1 CAPSULE(20 MG) BY MOUTH EVERY DAY     No current facility-administered medications for this visit.      Review of patient's allergies indicates:   Allergen Reactions    Benadryl allergy decongestant      Other reaction(s): hyperactive    Sulfamethoxazole-trimethoprim      Other reaction(s): Rash       History   Prior Therapy: No prior therapy   Social History: Lives alone in 2 story home, lives on first floor   Previous functional status: Prior to incident, pt independent in all ADLs and physical activity    Current functional status: Difficulty with prolonged standing  Work: Retired     Subjective   History of Present Illness: Pt presents to [facility] with complaints of chronic L hip pain. Pt reports 10 years ago, she fell on her L hip and may have injured her coccyx. Since then, she is has hip pain off and on. Pt denies any back pain or injury to lumbar spine. Pt  reports pain with prolonged walking, R sidelying, and prolonged standing. Pt states she has to sleep with a pillow between her legs, or she will experience pain. Pt however, with no difficulty sleeping once asleep. Pt would like to get back into a healthy exercise routine. Pt reports her   2 years ago, and she has not been as active as she was since then. Pt is R hand dominant. Pt with h/o breast cancer in 1980s with history of mastectomy of the R breast. Pt. denies history of minor or major trauma, motor vehicle accident, fall; present cancer; fever, chills, night sweats; unexplained weight change in past 3 months; recent infection; persistent night pain; saddle anesthesia, or changes in bowel/bladder function.      DOI: Chronic, off and on for years   Imaging, none:   Pain: current 0/10, worst 5/10, best 0/10, Aching, intermittent  Radicular symptoms: none   Aggravating factors: prolonged walking, standing, R sidelying   Easing factors: Rest   Pts goals: Would like to be more active     Objective   Mental status: alert, interactive, oriented x3  Posture/ Alignment: In standing, pt with excessive thoracic kyphosis, forward head, rounded shoulders, and R lateral wieght shift     Movement Analysis: Pt independent in all bed mobility and transfers.     GAIT DEVIATIONS: Ladonna amb with decreased ozzy and decreased weight-shifting ability.    ROM:     AROM ROM (% of normal) Comment Normal   Flexion: 75%  60 deg   Extension: 75%  25 deg   Lateral Flex R: 50%  25 deg   Lateral Flex L: 50%  25 deg   Rotation R: 50%  18 deg   Rotation L: 50%  18 deg   *denotes pain      PROM Right Left Comment   Hip ER, 90°  flex: 30 degrees 34 degrees    Hip IR, 90°  flex: 32 degrees 20 degrees    *pain      Strength:      Right Left Comment   Hip Flexion: 4-/5 4/5    Hip ABD: 4-/5 4/5    Hip Extension: 4-/5 3-/5    Knee Ext: 4+/5 4+/5    Knee Flex: 4/5 4/5        Special Tests:  - Maurice: left positive  - Scour: left  "positive  - FADDIR: left negative    Neurovascular:  - Sensation: Grossly intact to light touch across BLE    Palpation:  Pt with increased resting muscle tone of L piriformis. Pt TTP at greater trochanter and superior soft tissue structures. Pt with poor glut activation during supine glut set.     Joint Play:  Not assessed       Pt/family was provided educational information, including: PT evaluation findings, role of PT, goals for PT, scheduling - pt verbalized understanding. Discussed insurance plan with pt.     TREATMENT   Time In: 12:58 PM  Time Out: 1:50 PM    Discussed Plan of Care with patient: Yes    Ladonna received 15 minutes of therapeutic exercises including:   Supine HSS 3 x 30"   Fig 4 Piriformis stretch with knee overpressure 3 x 30"   Pelvic Tilt with TA iso 10 x 5" hold   Hooklying Abd RTB 10 x 5" hold   Hooklying Add 10 x 5" hold     (next visit: recumbent bike, bridges, clams, standing hip ext, mini squats)      Written Home Exercises Provided: yes   Exercises were reviewed and Ladonna was able to demonstrate them prior to the end of the session. Pt received a written copy of exercises to perform at home. Ladonna demonstrated good  understanding of the education provided.     Assessment   Ladonna is a 83 y.o. female referred to outpatient physical therapy with a medical diagnosis of trochanteric bursitis of L hip. Pt demonstrates muscle weakness of BLE, decreased IR of L hip, impaired muscle firing patterns of lumbopelvic complex, and postural impairments. Pt is a good candidate for skilled therapy services at this time to restore L hip mobility, increase musculature stabilization to L hip, and improve strength of BLE; so she may return to PLOF and prevent future injury. Pt prognosis is Good. Positive prognostic factors include motivation for therapy services. Negative prognostic factors include severity of symptoms. Pt will benefit from skilled outpatient physical therapy to address the " above stated deficits, provide pt/family education, and to maximize pt's level of independence.     History  Co-morbidities and personal factors that may impact the plan of care Examination  Body Structures and Functions, activity limitations and participation restrictions that may impact the plan of care    Clinical Presentation   Co-morbidities:   none        Personal Factors:   no deficits Body Regions:   lower extremities    Body Systems:   ROM  strength        Participation Restrictions:   Pt with no participation restrictions     Activity limitations:   Learning and applying knowledge  no deficits    General Tasks and Commands  no deficits    Communication  no deficits    Mobility  no deficits    Self care  no deficits    Domestic Life  no deficits    Interactions/Relationships  no deficits    Life Areas  no deficits    Community and Social Life  no deficits         stable and uncomplicated                      low   low  low Decision Making/ Complexity Score:  low     CMS Impairment/Limitation/Restriction for FOTO Hip Survey    Status   Limitation   Intake   60%   40%   Predicted 71%   29%     G-Code   CMS Severity Modifier  Current Status  CK - At least 40 percent but less than 60 percent  Goal Status+   CJ - At least 20 percent but less than 40 percent    Pt's spiritual, cultural and educational needs considered and pt agreeable to plan of care and goals as stated below:     Anticipated Barriers for physical therapy: none     Short Term GOALS:  In 4 weeks, pt. will:  Report decreased L hip pain < / = 3 /10 to increase tolerance for walking long distances  Pt will be able to demonstrate a proper TA contraction in order to provide stability in lumbar spine with functional tasks.  Pt will be able to perform bridging x 5 without pain and clearing the gluts to improve standing tolerance   Pt to tolerate HEP to improve ROM and independence with ADL's       Long Term GOALS:  In 8 weeks, pt. Will:  Increased MMT  For B hip extension by 1 grade to increase tolerance for ADLs  Pt will increase R hip abduction strength by 1 MMT grade in order to improve hip stability to tolerate prolonged standing  Pt will report > / = 30% decrease in difficulty tolerating prolonged standing in order to improve ability to perform household chores.   - be independent with HEP and SX management     Plan   Outpatient physical therapy 1 - 2 times weekly to include: pt ed, HEP, therapeutic exercises, therapeutic activities, neuromuscular re-education/ balance exercises, manual therapy, dry needling, and modalities prn. Cont PT for 6-8 weeks. Pt may be seen by PTA as part of the rehabilitation team.     I certify the need for these services furnished under this plan of treatment and while under my care.    Marialuisa Palacios, PT

## 2018-11-12 NOTE — PROGRESS NOTES
"Name: Ladonna St  Clinic Number: [id number]  Date of Treatment: 11/13/2018   Diagnosis:   Encounter Diagnoses   Name Primary?    Muscle weakness of lower extremity     Decreased range of left hip movement     Left hip pain        Physician: Pedro Houser MD    Time in: 10:55 AM  Time Out: 11:40 AM  Total Treatment Time: 45 min   Last PN: NA  Date of eval: 11/06/2018  Visit #: 2/30  Auth expiration: 10/31/2018  POC expiration: 02/06/2019    Precautions: Standard     Subjective     Ladonna reports she woke up with no hip pain this morning. Pt reports her hip is feeling good today. Pt states she "cheated" on her exercises, and did not complete them all yesterday. Patient reports their pain to be 0/10 on a 0-10 scale with 0 being no pain and 10 being the worst pain imaginable.    Objective     Ladonna received therapeutic exercises to develop strength, endurance, ROM, flexibility, posture and core stabilization for 35 minutes including:   Recumbent Bike x 5 min   Supine HSS 3 x 30"   Fig 4 Piriformis stretch with knee overpressure 3 x 30"   Pelvic Tilt with TA iso 20 x 5" hold   Hooklying Abd RTB 10 x 5" hold   Hooklying Add 10 x 5" hold   Clams x 15 each LE  Standing Hip Ext/Abd x 10 each LE  Mini Squats 2 x 10   Bridges  x 10     (next visit: lateral walking, shuttle,)    Ladonna received the following manual therapy techniques: Soft tissue Mobilization were applied to the: L hip  for 10 minutes.   STM to piriformis, ITB LLE in R sidelying     Written Home Exercises Provided: No, pt instructed to continue with previously issued HEP to tolerance     Pt educated on new therex, soreness after therapy. Pt demo good understanding of the education provided. Ladonna demonstrated good return demonstration of activities.     Assessment   Ladonna participated in today's treatment session without symptom provocation or adverse effects. Pt demonstrated minimal tightness in L piriformis which resolved with " STM. Pt demonstrated minimal glut lift off with bridges in supine today. Pt with improved glut activation during standing hip ext there ex. Pt required min cueing for appropriate squat mechanics throughout mini squat exercise. Pt educated on importance of compliance with HEP. Will continue to monitor and progress as tolerated. Pt will continue to benefit from skilled PT intervention. Medical Necessity is demonstrated by:  Pain limits function of effected part for some activities, Unable to participate fully in daily activities and Weakness.    Patient is making good progress towards established goals.    New/Revised goals: none at this time    Short Term GOALS:  In 4 weeks, pt. will:  Report decreased L hip pain < / = 3 /10 to increase tolerance for walking long distances  Pt will be able to demonstrate a proper TA contraction in order to provide stability in lumbar spine with functional tasks.  Pt will be able to perform bridging x 5 without pain and clearing the gluts to improve standing tolerance   Pt to tolerate HEP to improve ROM and independence with ADL's        Long Term GOALS:  In 8 weeks, pt. Will:  Increased MMT For B hip extension by 1 grade to increase tolerance for ADLs  Pt will increase R hip abduction strength by 1 MMT grade in order to improve hip stability to tolerate prolonged standing  Pt will report > / = 30% decrease in difficulty tolerating prolonged standing in order to improve ability to perform household chores.   - be independent with HEP and SX management       Plan   Continue with established Plan of Care towards PT goals.     Marialuisa Palacios, PT

## 2018-11-13 ENCOUNTER — CLINICAL SUPPORT (OUTPATIENT)
Dept: REHABILITATION | Facility: HOSPITAL | Age: 83
End: 2018-11-13
Attending: FAMILY MEDICINE
Payer: MEDICARE

## 2018-11-13 DIAGNOSIS — M62.81 MUSCLE WEAKNESS OF LOWER EXTREMITY: ICD-10-CM

## 2018-11-13 DIAGNOSIS — M25.652 DECREASED RANGE OF LEFT HIP MOVEMENT: ICD-10-CM

## 2018-11-13 DIAGNOSIS — M25.552 LEFT HIP PAIN: ICD-10-CM

## 2018-11-13 PROCEDURE — 97110 THERAPEUTIC EXERCISES: CPT | Mod: HCWC,PN

## 2018-11-13 PROCEDURE — 97140 MANUAL THERAPY 1/> REGIONS: CPT | Mod: HCWC,PN

## 2018-11-20 ENCOUNTER — CLINICAL SUPPORT (OUTPATIENT)
Dept: REHABILITATION | Facility: HOSPITAL | Age: 83
End: 2018-11-20
Attending: FAMILY MEDICINE
Payer: MEDICARE

## 2018-11-20 DIAGNOSIS — M25.552 LEFT HIP PAIN: ICD-10-CM

## 2018-11-20 DIAGNOSIS — M62.81 MUSCLE WEAKNESS OF LOWER EXTREMITY: ICD-10-CM

## 2018-11-20 DIAGNOSIS — M25.652 DECREASED RANGE OF LEFT HIP MOVEMENT: ICD-10-CM

## 2018-11-20 PROCEDURE — 97110 THERAPEUTIC EXERCISES: CPT | Mod: HCWC,PN

## 2018-11-20 PROCEDURE — 97140 MANUAL THERAPY 1/> REGIONS: CPT | Mod: HCWC,PN

## 2018-11-20 NOTE — PROGRESS NOTES
"Name: Ladonna St  Clinic Number: [id number]  Date of Treatment: 11/20/2018   Diagnosis:   Encounter Diagnoses   Name Primary?    Muscle weakness of lower extremity     Decreased range of left hip movement     Left hip pain        Physician: Pedro Houser MD    Time in: 1:02 PM  Time Out: 2:03 PM  Total Treatment Time: 60 min   Last PN: NA  Date of eval: 11/06/2018  Visit #: 3 / 30  Auth expiration: 10/31/2018  POC expiration: 02/06/2019    Precautions: Standard     Subjective     Ladonna reports some weakness and fatigue in the L hip if she is more active. States she has some family in town for the holiday and has been a little more busy. No current pain or soreness to the L hip. Patient reports their pain to be 0/10 on a 0-10 scale with 0 being no pain and 10 being the worst pain imaginable.    Objective     Ladonna received therapeutic exercises to develop strength, endurance, ROM, flexibility, posture and core stabilization for 50 minutes including:   Recumbent Bike x 5 min, L2, seat 8  Supine HSS with strap 3 x 30"   Supine Fig 4 Piriformis stretch with knee overpressure 3 x 30"   Pelvic Tilt with TA iso 20 x 5" hold   Bridges  x 10 (cues for breath control and increased lift height)  Hooklying Abd RTB 2 x 10 x 5" hold   Hooklying Add 20 x 5" hold   SL Clams x 15 each LE    Standing Hip Ext/Abd x 10 each LE  Mini Squats 2 x 10 (maybe sit-stand without UE assist for next session to improve body mechanics)  Lateral walks in // bars x 2 laps (cues for smaller steps to avoid pain)  Shuttle B LE press 2 bands 2 x 10    Ladonna received the following manual therapy techniques: Soft tissue Mobilization were applied to the: L hip  for 10 minutes.   STM to piriformis, ITB LLE in R sidelying     Written Home Exercises Provided: No, pt instructed to continue with previously issued HEP to tolerance     Pt educated on new therex, soreness after therapy. Pt demo good understanding of the education " provided. Ladonna demonstrated good return demonstration of activities.     Assessment   Ladonna tolerated treatment well this date without onset of pain into the L hip. Mild discomfort to lateral L hip with lateral steps toward right, improved with rest. Cues for proper technique of PPT as well as supine bridges but each improved with repetition. Pt also required manual and verbal cues to prevent posterior hip roll with SL clamshells. Minimal tightness in L piriformis this date, improved with STM. Patient appears to have better understanding for importance of compliance to daily exercises. Will continue to monitor and progress as tolerated.  Pt will continue to benefit from skilled PT intervention. Medical Necessity is demonstrated by:  Pain limits function of effected part for some activities, Unable to participate fully in daily activities and Weakness.    Patient is making good progress towards established goals.    New/Revised goals: none at this time    Short Term GOALS:  In 4 weeks, pt. will:  Report decreased L hip pain < / = 3 /10 to increase tolerance for walking long distances  Pt will be able to demonstrate a proper TA contraction in order to provide stability in lumbar spine with functional tasks.  Pt will be able to perform bridging x 5 without pain and clearing the gluts to improve standing tolerance   Pt to tolerate HEP to improve ROM and independence with ADL's        Long Term GOALS:  In 8 weeks, pt. Will:  Increased MMT For B hip extension by 1 grade to increase tolerance for ADLs  Pt will increase R hip abduction strength by 1 MMT grade in order to improve hip stability to tolerate prolonged standing  Pt will report > / = 30% decrease in difficulty tolerating prolonged standing in order to improve ability to perform household chores.   - be independent with HEP and SX management       Plan   Continue with established Plan of Care towards PT goals.     Lois No, PTA

## 2018-11-27 ENCOUNTER — CLINICAL SUPPORT (OUTPATIENT)
Dept: REHABILITATION | Facility: HOSPITAL | Age: 83
End: 2018-11-27
Attending: FAMILY MEDICINE
Payer: MEDICARE

## 2018-11-27 DIAGNOSIS — M25.652 DECREASED RANGE OF LEFT HIP MOVEMENT: ICD-10-CM

## 2018-11-27 DIAGNOSIS — M25.552 LEFT HIP PAIN: ICD-10-CM

## 2018-11-27 DIAGNOSIS — M62.81 MUSCLE WEAKNESS OF LOWER EXTREMITY: ICD-10-CM

## 2018-11-27 PROCEDURE — 97110 THERAPEUTIC EXERCISES: CPT | Mod: HCWC,PN

## 2018-11-27 PROCEDURE — 97140 MANUAL THERAPY 1/> REGIONS: CPT | Mod: HCWC,PN

## 2018-11-27 NOTE — PROGRESS NOTES
"Name: Ladonna St  [facility] Number: [id number]  Date of Treatment: 11/27/2018   Diagnosis:   Encounter Diagnoses   Name Primary?    Muscle weakness of lower extremity     Decreased range of left hip movement     Left hip pain        Physician: Pedro Houser MD    Time in: 1:00 PM  Time Out: 1:55 PM  Total Treatment Time: 55 min   Last PN: NA  Date of eval: 11/06/2018  Visit #: 4 / 30  Auth expiration: 10/31/2018  POC expiration: 02/06/2019    Precautions: Standard     Subjective     Ladonna reports she has been compliant with most of her HEP. Pt reports she continues to have pain in L hip with prolonged sidelying positioning. Pt states she was able to walk farther than normal this past weekend at the [employer] game without symptom provocation. Patient reports their pain to be 0/10 on a 0-10 scale with 0 being no pain and 10 being the worst pain imaginable.    Objective     Ladonna received therapeutic exercises to develop strength, endurance, ROM, flexibility, posture and core stabilization for 50 minutes including:   Recumbent Bike x 5 min, L2, seat 8  Supine HSS with strap 3 x 30"   Supine Fig 4 Piriformis stretch with knee overpressure 3 x 30"   Pelvic Tilt with TA iso 20 x 5" hold   Bridges  x 10 (cues for breath control and increased lift height)  Hooklying Abd RTB 2 x 10 x 5" hold   Hooklying Add 20 x 5" hold   SL Clams x 15 each LE  Standing Hip Ext/Abd x 10 each LE  Mini Squats 2 x 10 (maybe sit-stand without UE assist for next session to improve body mechanics)  Lateral walks in // bars x 2 laps   Shuttle B LE press 2 bands 2 x 10  Lateral Step ups on stairs 2 x 10     Ladonna received the following manual therapy techniques: Soft tissue Mobilization were applied to the: L hip  for 10 minutes.   STM to piriformis, ITB LLE in R sidelying     Written Home Exercises Provided: No, pt instructed to continue with previously issued HEP to tolerance     Pt educated on new therex, soreness after " therapy. Pt demo good understanding of the education provided. Ladonna demonstrated good return demonstration of activities.     Assessment   Ladonna tolerated treatment well this date without onset of pain into the L hip. Pt demonstrated appropriate muscular fatigue following therex. Pt able to tolerate addition of lateral step ups to increase glut strength. Pt with slight increase in resting muscle tone of piriformis; only tender with palpation. Pt continues to require min cueing throughout therex for appropriate form and posture for ideal muscle activation. Will continue to monitor and progress as tolerated.  Pt will continue to benefit from skilled PT intervention. Medical Necessity is demonstrated by:  Pain limits function of effected part for some activities, Unable to participate fully in daily activities and Weakness.    Patient is making good progress towards established goals.    New/Revised goals: none at this time    Short Term GOALS:  In 4 weeks, pt. will:  Report decreased L hip pain < / = 3 /10 to increase tolerance for walking long distances  Pt will be able to demonstrate a proper TA contraction in order to provide stability in lumbar spine with functional tasks.  Pt will be able to perform bridging x 5 without pain and clearing the gluts to improve standing tolerance   Pt to tolerate HEP to improve ROM and independence with ADL's        Long Term GOALS:  In 8 weeks, pt. Will:  Increased MMT For B hip extension by 1 grade to increase tolerance for ADLs  Pt will increase R hip abduction strength by 1 MMT grade in order to improve hip stability to tolerate prolonged standing  Pt will report > / = 30% decrease in difficulty tolerating prolonged standing in order to improve ability to perform household chores.   - be independent with HEP and SX management       Plan   Continue with established Plan of Care towards PT goals.     Marailuisa Palacios, PT

## 2018-12-04 ENCOUNTER — CLINICAL SUPPORT (OUTPATIENT)
Dept: REHABILITATION | Facility: HOSPITAL | Age: 83
End: 2018-12-04
Attending: FAMILY MEDICINE
Payer: MEDICARE

## 2018-12-04 DIAGNOSIS — M25.652 DECREASED RANGE OF LEFT HIP MOVEMENT: ICD-10-CM

## 2018-12-04 DIAGNOSIS — M25.552 LEFT HIP PAIN: ICD-10-CM

## 2018-12-04 DIAGNOSIS — M62.81 MUSCLE WEAKNESS OF LOWER EXTREMITY: ICD-10-CM

## 2018-12-04 PROCEDURE — G8978 MOBILITY CURRENT STATUS: HCPCS | Mod: CJ,HCWC,PN

## 2018-12-04 PROCEDURE — 97110 THERAPEUTIC EXERCISES: CPT | Mod: HCWC,PN

## 2018-12-04 PROCEDURE — G8979 MOBILITY GOAL STATUS: HCPCS | Mod: CJ,HCWC,PN

## 2018-12-04 NOTE — PROGRESS NOTES
"[id number]  Date of Treatment: 12/04/2018   Diagnosis:   Encounter Diagnoses   Name Primary?    Muscle weakness of lower extremity     Decreased range of left hip movement     Left hip pain        Physician: Pedro Houser MD    Time in: 9:03 AM  Time Out: 10:00 AM  Total Treatment Time: 57 min   Last PN: NA  Date of eval: 11/06/2018  Visit #: 6/ 30  Auth expiration: 10/31/2018  POC expiration: 02/06/2019    Precautions: Standard     Subjective     Ladonna reports her hip is doing well today and is not experiencing any pain. Pt reports she has been doing more outside of therapy without provocation. Patient reports their pain to be 0/10 on a 0-10 scale with 0 being no pain and 10 being the worst pain imaginable.    Objective     Ladonna received therapeutic exercises to develop strength, endurance, ROM, flexibility, posture and core stabilization for 50 minutes including:   Recumbent Bike x 5 min, L2, seat 8  Supine HSS with strap 3 x 30"   Supine Fig 4 Piriformis stretch with knee overpressure 3 x 30"   Pelvic Tilt with TA iso 20 x 5" hold   Bridges  x 10 (cues for breath control and increased lift height)  Hooklying Abd RTB 2 x 10 x 5" hold   Hooklying Add 20 x 5" hold   SL Clams x 15 each LE  Standing Hip Ext/Abd x 10 each LE  Mini Squats 2 x 10 (maybe sit-stand without UE assist for next session to improve body mechanics)  Lateral walks in // bars x 2 laps   Shuttle B LE press 2 bands 2 x 10  Lateral Step ups on stairs 2 x 10     Ladonna received the following manual therapy techniques: Soft tissue Mobilization were applied to the: L hip  for 5 minutes.   STM to piriformis, ITB LLE in R sidelying     Written Home Exercises Provided: No, pt instructed to continue with previously issued HEP to tolerance     Pt educated on new therex, soreness after therapy. Pt demo good understanding of the education provided. Ladonna demonstrated good return demonstration of " activities.     Assessment   Ladonna participated in treatment session without symptom provocation. Pt continues to demonstrate mild muscle guarding to L piriformis, however improvements from previous treatment. Pt continues to demonstrate weakness of gluts evident with bridge and mini squat exercise. Pt requires min verbal and visual cueing for appropriate technique of therex.  Will continue to monitor and progress as tolerated.  Pt will continue to benefit from skilled PT intervention. Medical Necessity is demonstrated by:  Pain limits function of effected part for some activities, Unable to participate fully in daily activities and Weakness.    Patient is making good progress towards established goals.    New/Revised goals: none at this time    Short Term GOALS:  In 4 weeks, pt. will:  Report decreased L hip pain < / = 3 /10 to increase tolerance for walking long distances  Pt will be able to demonstrate a proper TA contraction in order to provide stability in lumbar spine with functional tasks.  Pt will be able to perform bridging x 5 without pain and clearing the gluts to improve standing tolerance   Pt to tolerate HEP to improve ROM and independence with ADL's        Long Term GOALS:  In 8 weeks, pt. Will:  Increased MMT For B hip extension by 1 grade to increase tolerance for ADLs  Pt will increase R hip abduction strength by 1 MMT grade in order to improve hip stability to tolerate prolonged standing  Pt will report > / = 30% decrease in difficulty tolerating prolonged standing in order to improve ability to perform household chores.   - be independent with HEP and SX management     CMS Impairment/Limitation/Restriction for FOTO Hip Survey    Status   Limitation   Intake   60%   40%  Predicted  71%   29%   12/4/2018  65%   35%     G-Code   CMS Severity Modifier  Current Status  CJ - At least 20 percent but less than 40 percent  Goal Status+   CJ - At least 20 percent but less than 40 percent    Plan    Continue with established Plan of Care towards PT goals.     Marialuisa Palacios, PT

## 2018-12-11 ENCOUNTER — CLINICAL SUPPORT (OUTPATIENT)
Dept: REHABILITATION | Facility: HOSPITAL | Age: 83
End: 2018-12-11
Attending: FAMILY MEDICINE
Payer: MEDICARE

## 2018-12-11 DIAGNOSIS — M25.652 DECREASED RANGE OF LEFT HIP MOVEMENT: ICD-10-CM

## 2018-12-11 DIAGNOSIS — M62.81 MUSCLE WEAKNESS OF LOWER EXTREMITY: ICD-10-CM

## 2018-12-11 DIAGNOSIS — M25.552 LEFT HIP PAIN: ICD-10-CM

## 2018-12-11 PROCEDURE — 97110 THERAPEUTIC EXERCISES: CPT | Mod: HCWC,PN

## 2018-12-11 NOTE — PROGRESS NOTES
"Name: Ladonna St  Clinic Number: [id number]  Date of Treatment: 12/11/2018   Diagnosis:   Encounter Diagnoses   Name Primary?    Muscle weakness of lower extremity     Decreased range of left hip movement     Left hip pain        Physician: Pedro Houser MD    Time in: 1:00 PM  Time Out: 2:00 PM  Total Treatment Time: 60 min   Last PN: NA  Date of eval: 11/06/2018  Visit #: 7 / 30  Auth expiration: 10/31/2018  POC expiration: 02/06/2019    Precautions: Standard     Subjective     Ladonna reports having some increase of soreness into the hip over the weekend since she was doing more cleaning and moving around the house. Has improved since with no reported soreness or pain into the R hip. Patient reports their pain to be 0/10 on a 0-10 scale with 0 being no pain and 10 being the worst pain imaginable.    Objective     Ladonna received therapeutic exercises to develop strength, endurance, ROM, flexibility, posture and core stabilization for 60 minutes including:   Recumbent Bike x 5 min, L2, seat 8  Supine HSS with strap 3 x 30"   Supine Fig 4 Piriformis stretch with knee overpressure 3 x 30"   Pelvic Tilt with TA iso 20 x 5" hold   Bridges 2 x 10 (cues for breath control and increased lift height)  Hooklying Abd GTB 2 x 10 x 5" hold   Hooklying Add 20 x 5" hold   SL Clams x 15 each LE  Standing Hip Ext/Abd x 10 each LE  Mini Squats 2 x 10 (maybe sit-stand without UE assist for next session to improve body mechanics)  Lateral walks in // bars x 2 laps YTB  Standing march x 20 (cues to maintain level pelvis)  (NP) Shuttle B LE press 2 bands 2 x 10  (NP) Lateral Step ups on stairs 2 x 10     Ladonna received the following manual therapy techniques: Soft tissue Mobilization were applied to the: L hip  for 0 minutes.   STM to piriformis, ITB LLE in R sidelying     Written Home Exercises Provided: No, pt instructed to continue with previously issued HEP to tolerance     Pt educated on new therex, " soreness after therapy. Pt demo good understanding of the education provided. Ladonna demonstrated good return demonstration of activities.     Assessment   Ladonna demonstrated overall good tolerance to treatment this date. Cues for proper technique throughout session as well as to maintain upright posture. Weakness of B legs and hips with tightness into B piriformis muscles. Gluteal weakness noted with standing marches and supine bridges.   Pt will continue to benefit from skilled PT intervention. Medical Necessity is demonstrated by:  Pain limits function of effected part for some activities, Unable to participate fully in daily activities and Weakness.    Patient is making good progress towards established goals.    New/Revised goals: none at this time    Short Term GOALS:  In 4 weeks, pt. will:  Report decreased L hip pain < / = 3 /10 to increase tolerance for walking long distances  Pt will be able to demonstrate a proper TA contraction in order to provide stability in lumbar spine with functional tasks.  Pt will be able to perform bridging x 5 without pain and clearing the gluts to improve standing tolerance   Pt to tolerate HEP to improve ROM and independence with ADL's        Long Term GOALS:  In 8 weeks, pt. Will:  Increased MMT For B hip extension by 1 grade to increase tolerance for ADLs  Pt will increase R hip abduction strength by 1 MMT grade in order to improve hip stability to tolerate prolonged standing  Pt will report > / = 30% decrease in difficulty tolerating prolonged standing in order to improve ability to perform household chores.   - be independent with HEP and SX management     CMS Impairment/Limitation/Restriction for FOTO Hip Survey    Status   Limitation   Intake   60%   40%  Predicted  71%   29%   12/4/2018  65%   35%     G-Code   CMS Severity Modifier  Current Status  CJ - At least 20 percent but less than 40 percent  Goal Status+   CJ - At least 20 percent but less than 40  percent    Plan   Continue with established Plan of Care towards PT goals.     Lois No, PTA

## 2018-12-17 RX ORDER — AMITRIPTYLINE HYDROCHLORIDE 10 MG/1
TABLET, FILM COATED ORAL
Qty: 90 TABLET | Refills: 3 | Status: SHIPPED | OUTPATIENT
Start: 2018-12-17 | End: 2020-01-17

## 2018-12-18 ENCOUNTER — CLINICAL SUPPORT (OUTPATIENT)
Dept: REHABILITATION | Facility: HOSPITAL | Age: 83
End: 2018-12-18
Attending: FAMILY MEDICINE
Payer: MEDICARE

## 2018-12-18 DIAGNOSIS — M25.552 LEFT HIP PAIN: ICD-10-CM

## 2018-12-18 DIAGNOSIS — M62.81 MUSCLE WEAKNESS OF LOWER EXTREMITY: ICD-10-CM

## 2018-12-18 DIAGNOSIS — M25.652 DECREASED RANGE OF LEFT HIP MOVEMENT: ICD-10-CM

## 2018-12-18 PROCEDURE — G8979 MOBILITY GOAL STATUS: HCPCS | Mod: CJ,PN

## 2018-12-18 PROCEDURE — G8978 MOBILITY CURRENT STATUS: HCPCS | Mod: CJ,PN

## 2018-12-18 PROCEDURE — 97110 THERAPEUTIC EXERCISES: CPT | Mod: PN

## 2018-12-18 PROCEDURE — G8980 MOBILITY D/C STATUS: HCPCS | Mod: CJ,PN

## 2018-12-18 NOTE — PROGRESS NOTES
Name: Ladonna St  [facility] Number: [id number]  Date of Treatment: 12/18/2018   Diagnosis:   Encounter Diagnoses   Name Primary?    Muscle weakness of lower extremity     Decreased range of left hip movement     Left hip pain        Physician: Pedro Houser MD    Time in: 1:00 PM  Time Out: 2:00 PM  Total Treatment Time: 60 min   Last PN: NA  Date of eval: 11/06/2018  Visit #: 7 / 30  Auth expiration: 10/31/2019  POC expiration: 02/06/2019    Precautions: Standard     Subjective     Ladonna reports she was moving things around the house, so her back is bothering her a little today. Pt reports no hip pain, and overall improvement of symptoms. Pt reports pain only with prolonged lying on L side; however, it does not wake her up within the night.  Patient reports their pain to be 0/10 on a 0-10 scale with 0 being no pain and 10 being the worst pain imaginable. Worst 1 -2 /10    Objective     Posture/ Alignment: In standing, pt with excessive thoracic kyphosis, forward head, rounded shoulders, and R lateral wieght shift      Movement Analysis: Pt independent in all bed mobility and transfers.      GAIT DEVIATIONS: Ladonna amb with decreased ozzy     ROM:      AROM ROM (% of normal) Comment Normal   Flexion: 100%   60 deg   Extension: 75%   25 deg   Lateral Flex R: 75%   25 deg   Lateral Flex L: 75%   25 deg   Rotation R: 75%   18 deg   Rotation L: 75%   18 deg   *denotes pain        PROM Right Left Comment   Hip ER, 90°  flex: 30 degrees 34 degrees     Hip IR, 90°  flex: 32 degrees 26 degrees     *pain        Strength:        Right Left Comment   Hip Flexion: 4/5 4/5     Hip ABD: 4/5 4/5     Hip Extension: 4/5 4/5     Knee Ext: 4+/5 4+/5     Knee Flex: 4+/5 4+/5           Neurovascular:  - Sensation: Grossly intact to light touch across BLE     Palpation:  Pt TTP at greater trochanter and superior soft tissue structures.      Joint Play:  Not assessed     Ladonna received therapeutic exercises to  "develop strength, endurance, ROM, flexibility, posture and core stabilization for 55 minutes including:   Recumbent Bike x 5 min, L2, seat 8 (NP TODAY)  Supine HSS with strap 3 x 30"  (NP)  Supine Fig 4 Piriformis stretch with knee overpressure 3 x 30"   Pelvic Tilt with TA iso 20 x 5" hold   Bridges 2 x 10 (cues for breath control and increased lift height)  Hooklying Abd GTB 2 x 10 x 5" hold   Hooklying Add 20 x 5" hold   SL Clams x 15 each LE  Standing Hip Ext/Abd x 10 each LE  Mini Squats 2 x 10 (maybe sit-stand without UE assist for next session to improve body mechanics)  Lateral walks in // bars x 2 laps YTB  Standing march x 20 (cues to maintain level pelvis)  (NP) Shuttle B LE press 2 bands 2 x 10  (NP) Lateral Step ups on stairs 2 x 10     Ladonna received the following manual therapy techniques: Soft tissue Mobilization were applied to the: L hip  for 5 minutes.   STM to piriformis, ITB LLE in R sidelying     Written Home Exercises Provided: Yes, pt provided with handout of pelvic tilts, hooklying abd/add, clams, standing hip ext/abd, and mini squats     Pt educated on compliance with HEP. Pt demo good understanding of the education provided. Ladonna demonstrated good return demonstration of activities.     Assessment   Ladonna demonstrated improvements in BLE strength (L>R) and lumbar and hip AROM since beginning therapy services. Pt with improved functional mobility and independence with HEP. Pt encouraged to complete HEP daily and begin walking and/or biking program on recumbent bike for independent management of symptoms. Pt verbalized understanding. Pt is appropriate for discharge to independent HEP at this time. Patient was seen for 7 outpatient PT visits from 11/06/2018 to 12/18/2018. Treatment included: evaluation, HEP, pt education, manual therapy, and there ex. Pt has met all goals. Continue HEP. This patient is discharged from outpatient PT Services.    Short Term GOALS:  In 4 weeks, pt. " will:  Report decreased L hip pain < / = 3 /10 to increase tolerance for walking long distances (MET 12/18/2018)  Pt will be able to demonstrate a proper TA contraction in order to provide stability in lumbar spine with functional tasks. (MET 12/18/2018)  Pt will be able to perform bridging x 5 without pain and clearing the gluts to improve standing tolerance  (MET 12/18/2018)  Pt to tolerate HEP to improve ROM and independence with ADL's (MET 12/18/2018)        Long Term GOALS:  In 8 weeks, pt. Will:  Increased MMT For B hip extension by 1 grade to increase tolerance for ADLs (MET 12/18/2018)  Pt will increase R hip abduction strength by 1 MMT grade in order to improve hip stability to tolerate prolonged standing (MET 12/18/2018)  Pt will report > / = 30% decrease in difficulty tolerating prolonged standing in order to improve ability to perform household chores. (MET 12/18/2018)  - be independent with HEP and SX management  (MET 12/18/2018)    CMS Impairment/Limitation/Restriction for FOTO Hip Survey    Status   Limitation   Intake   60%   40%  Predicted  71%   29%   12/4/2018  65%   35%   12/18/2018  63%   37%    G-Code   CMS Severity Modifier  Current Status  CJ - At least 20 percent but less than 40 percent  Goal Status+   CJ - At least 20 percent but less than 40 percent  DC Status+   CJ - At least 20 percent but less than 40 percent    Plan   Pt is discharged from skilled therapy services. Continue HEP as prescribed.    Marialuisa Palacios, PT

## 2019-03-21 ENCOUNTER — OFFICE VISIT (OUTPATIENT)
Dept: FAMILY MEDICINE | Facility: CLINIC | Age: 84
End: 2019-03-21
Payer: MEDICARE

## 2019-03-21 VITALS
OXYGEN SATURATION: 97 % | BODY MASS INDEX: 21.42 KG/M2 | HEART RATE: 66 BPM | SYSTOLIC BLOOD PRESSURE: 120 MMHG | WEIGHT: 141.31 LBS | DIASTOLIC BLOOD PRESSURE: 78 MMHG | HEIGHT: 68 IN

## 2019-03-21 DIAGNOSIS — K21.9 GASTROESOPHAGEAL REFLUX DISEASE WITHOUT ESOPHAGITIS: ICD-10-CM

## 2019-03-21 DIAGNOSIS — Z78.0 POSTMENOPAUSAL: ICD-10-CM

## 2019-03-21 DIAGNOSIS — Z00.00 ENCOUNTER FOR PREVENTIVE HEALTH EXAMINATION: Primary | ICD-10-CM

## 2019-03-21 DIAGNOSIS — F41.9 ANXIETY: ICD-10-CM

## 2019-03-21 PROCEDURE — G0439 PR MEDICARE ANNUAL WELLNESS SUBSEQUENT VISIT: ICD-10-PCS | Mod: HCNC,S$GLB,, | Performed by: NURSE PRACTITIONER

## 2019-03-21 PROCEDURE — 99999 PR PBB SHADOW E&M-EST. PATIENT-LVL IV: CPT | Mod: PBBFAC,HCNC,, | Performed by: NURSE PRACTITIONER

## 2019-03-21 PROCEDURE — G0439 PPPS, SUBSEQ VISIT: HCPCS | Mod: HCNC,S$GLB,, | Performed by: NURSE PRACTITIONER

## 2019-03-21 PROCEDURE — 99999 PR PBB SHADOW E&M-EST. PATIENT-LVL IV: ICD-10-PCS | Mod: PBBFAC,HCNC,, | Performed by: NURSE PRACTITIONER

## 2019-03-21 PROCEDURE — 99499 RISK ADDL DX/OHS AUDIT: ICD-10-PCS | Mod: HCNC,S$GLB,, | Performed by: NURSE PRACTITIONER

## 2019-03-21 PROCEDURE — 99499 UNLISTED E&M SERVICE: CPT | Mod: HCNC,S$GLB,, | Performed by: NURSE PRACTITIONER

## 2019-03-21 NOTE — PROGRESS NOTES
"Ladonna St presented for a  Medicare AWV and comprehensive Health Risk Assessment today. The following components were reviewed and updated:    · Medical history  · Family History  · Social history  · Allergies and Current Medications  · Health Risk Assessment  · Health Maintenance  · Care Team     ** See Completed Assessments for Annual Wellness Visit within the encounter summary.**     The following assessments were completed:  · Living Situation  · CAGE  · Depression Screening  · Timed Get Up and Go  · Whisper Test  · Cognitive Function Screening      · Nutrition Screening  · ADL Screening  · PAQ Screening    Vitals:    03/21/19 1053   BP: 120/78   BP Location: Left arm   Patient Position: Sitting   BP Method: Medium (Manual)   Pulse: 66   SpO2: 97%   Weight: 64.1 kg (141 lb 5 oz)   Height: 5' 8" (1.727 m)     Body mass index is 21.49 kg/m².  Physical Exam   Constitutional: She is oriented to person, place, and time. She appears well-nourished.   Cardiovascular: Normal rate, regular rhythm, normal heart sounds and intact distal pulses.   Pulmonary/Chest: Effort normal and breath sounds normal.   Neurological: She is alert and oriented to person, place, and time.   Skin: Skin is warm and dry.   Vitals reviewed.      Diagnoses and health risks identified today and associated recommendations/orders:    1. Encounter for preventive health examination  Reviewed and discussed health maintenance.    - DXA Bone Density Spine And Hip; Future    2. Postmenopausal  - DXA Bone Density Spine And Hip; Future    3. Anxiety  Stable- continue current treatment and follow up routinely with PCP     4. Gastroesophageal reflux disease without esophagitis  Stable- continue current treatment and follow up routinely with PCP     Provided Ladonna with a 5-10 year written screening schedule and personal prevention plan. Recommendations were developed using the USPSTF age appropriate recommendations. Education, counseling, and " referrals were provided as needed. After Visit Summary printed and given to patient which includes a list of additional screenings\tests needed.    Mary Wilkerson NP

## 2019-03-21 NOTE — PATIENT INSTRUCTIONS
Counseling and Referral of Other Preventative  (Italic type indicates deductible and co-insurance are waived)    Patient Name: Ladonna St  Today's Date: 3/21/2019    Health Maintenance       Date Due Completion Date    Influenza Vaccine 08/01/2018 9/28/2017 (Done)    Override on 9/28/2017: Done    Override on 10/12/2016: Done ([facility])    Override on 10/14/2015: Done    DEXA SCAN 10/20/2018 10/20/2015 (ClinicallyNA)    Override on 10/20/2015: Not Clinically Appropriate    Lipid Panel 07/02/2023 7/2/2018    TETANUS VACCINE 06/22/2026 6/22/2016 (Declined)    Override on 6/22/2016: Declined        No orders of the defined types were placed in this encounter.    The following information is provided to all patients.  This information is to help you find resources for any of the problems found today that may be affecting your health:                Living healthy guide: www.[facility].[place].[facility]      Understanding Diabetes: www.diabetes.org      Eating healthy: www.cdc.gov/healthyweight      CDC home safety checklist: www.cdc.gov/steadi/patient.html      Agency on Aging: www.goea.[place].[facility]      Alcoholics anonymous (AA): www.aa.org      Physical Activity: www.kash.nih.gov/hq3iltd      Tobacco use: www.quitwithusla.org

## 2019-03-26 ENCOUNTER — HOSPITAL ENCOUNTER (OUTPATIENT)
Dept: RADIOLOGY | Facility: HOSPITAL | Age: 84
Discharge: HOME | End: 2019-03-26
Attending: NURSE PRACTITIONER
Payer: MEDICARE

## 2019-03-26 DIAGNOSIS — Z78.0 POSTMENOPAUSAL: ICD-10-CM

## 2019-03-26 DIAGNOSIS — Z00.00 ENCOUNTER FOR PREVENTIVE HEALTH EXAMINATION: ICD-10-CM

## 2019-03-26 PROCEDURE — 77080 DXA BONE DENSITY AXIAL: CPT | Mod: TC,HCNC,PO

## 2019-03-26 PROCEDURE — 77080 DEXA BONE DENSITY SPINE HIP: ICD-10-PCS | Mod: 26,HCNC,, | Performed by: RADIOLOGY

## 2019-03-26 PROCEDURE — 77080 DXA BONE DENSITY AXIAL: CPT | Mod: 26,HCNC,, | Performed by: RADIOLOGY

## 2019-06-13 ENCOUNTER — OFFICE VISIT (OUTPATIENT)
Dept: FAMILY MEDICINE | Facility: CLINIC | Age: 84
End: 2019-06-13
Payer: MEDICARE

## 2019-06-13 VITALS
HEART RATE: 68 BPM | HEIGHT: 68 IN | TEMPERATURE: 99 F | BODY MASS INDEX: 21.28 KG/M2 | DIASTOLIC BLOOD PRESSURE: 80 MMHG | WEIGHT: 140.44 LBS | OXYGEN SATURATION: 96 % | SYSTOLIC BLOOD PRESSURE: 142 MMHG

## 2019-06-13 DIAGNOSIS — J02.9 PHARYNGITIS, UNSPECIFIED ETIOLOGY: Primary | ICD-10-CM

## 2019-06-13 PROCEDURE — 87081 CULTURE SCREEN ONLY: CPT | Mod: HCNC

## 2019-06-13 PROCEDURE — 99999 PR PBB SHADOW E&M-EST. PATIENT-LVL III: ICD-10-PCS | Mod: PBBFAC,HCNC,, | Performed by: PHYSICIAN ASSISTANT

## 2019-06-13 PROCEDURE — 99213 OFFICE O/P EST LOW 20 MIN: CPT | Mod: HCNC,S$GLB,, | Performed by: PHYSICIAN ASSISTANT

## 2019-06-13 PROCEDURE — 99213 PR OFFICE/OUTPT VISIT, EST, LEVL III, 20-29 MIN: ICD-10-PCS | Mod: HCNC,S$GLB,, | Performed by: PHYSICIAN ASSISTANT

## 2019-06-13 PROCEDURE — 1101F PT FALLS ASSESS-DOCD LE1/YR: CPT | Mod: HCNC,CPTII,S$GLB, | Performed by: PHYSICIAN ASSISTANT

## 2019-06-13 PROCEDURE — 1101F PR PT FALLS ASSESS DOC 0-1 FALLS W/OUT INJ PAST YR: ICD-10-PCS | Mod: HCNC,CPTII,S$GLB, | Performed by: PHYSICIAN ASSISTANT

## 2019-06-13 PROCEDURE — 99999 PR PBB SHADOW E&M-EST. PATIENT-LVL III: CPT | Mod: PBBFAC,HCNC,, | Performed by: PHYSICIAN ASSISTANT

## 2019-06-13 NOTE — PROGRESS NOTES
"Subjective:      Patient ID: Ladonna St is a 83 y.o. female.    Chief Complaint: Sore Throat (started Tue after returning from [place])    Patient is new to me, here today for sore throat    Sore Throat    This is a new problem. The current episode started in the past 7 days. The problem has been gradually worsening. Associated symptoms include trouble swallowing (mild hoarsness). Pertinent negatives include no abdominal pain, congestion, coughing, diarrhea, headaches or vomiting. Treatments tried: gargles. The treatment provided no relief.     Review of Systems   Constitutional: Negative for chills, diaphoresis and fever.   HENT: Positive for rhinorrhea (mild), sore throat and trouble swallowing (mild hoarsness). Negative for congestion.    Respiratory: Negative for cough and wheezing.    Gastrointestinal: Negative for abdominal pain, constipation, diarrhea, nausea and vomiting.   Neurological: Negative for dizziness, light-headedness and headaches.       Objective:   BP (!) 142/80 (BP Location: Left arm, Patient Position: Sitting, BP Method: Large (Manual))   Pulse 68   Temp 98.8 °F (37.1 °C) (Oral)   Ht 5' 8" (1.727 m)   Wt 63.7 kg (140 lb 6.9 oz)   SpO2 96%   BMI 21.35 kg/m²   Physical Exam   Constitutional: She appears well-developed and well-nourished. She does not appear ill. No distress.   HENT:   Head: Normocephalic and atraumatic.   Right Ear: Tympanic membrane and ear canal normal. Tympanic membrane is not erythematous. No middle ear effusion.   Left Ear: Tympanic membrane and ear canal normal. Tympanic membrane is not erythematous.  No middle ear effusion.   Nose: No mucosal edema. Right sinus exhibits no maxillary sinus tenderness and no frontal sinus tenderness. Left sinus exhibits no maxillary sinus tenderness and no frontal sinus tenderness.   Mouth/Throat: Uvula is midline. Posterior oropharyngeal erythema present.   Signs of PND   Cardiovascular: Normal rate, regular rhythm and " normal heart sounds.   No murmur heard.  Pulmonary/Chest: Effort normal and breath sounds normal. No respiratory distress. She has no decreased breath sounds. She has no wheezes. She has no rhonchi. She has no rales.   Lymphadenopathy:     She has no cervical adenopathy.   Skin: Skin is warm and dry. No rash noted. She is not diaphoretic.   Psychiatric: She has a normal mood and affect. Her speech is normal and behavior is normal. Thought content normal. Cognition and memory are normal.     Assessment:      1. Pharyngitis, unspecified etiology       Plan:   Pharyngitis, unspecified etiology  -     POCT rapid strep A  -     Strep A culture, throat      Symptomatic care    Discussed worsening signs/symptoms and when to return to clinic or go to ED.   Patient expresses understanding and agrees with treatment plan.

## 2019-06-15 ENCOUNTER — OFFICE VISIT (OUTPATIENT)
Dept: URGENT CARE | Facility: CLINIC | Age: 84
End: 2019-06-15
Payer: MEDICARE

## 2019-06-15 VITALS
SYSTOLIC BLOOD PRESSURE: 106 MMHG | TEMPERATURE: 99 F | HEART RATE: 76 BPM | DIASTOLIC BLOOD PRESSURE: 64 MMHG | WEIGHT: 140 LBS | BODY MASS INDEX: 21.29 KG/M2 | OXYGEN SATURATION: 95 %

## 2019-06-15 DIAGNOSIS — J02.8 ACUTE BACTERIAL PHARYNGITIS: Primary | ICD-10-CM

## 2019-06-15 DIAGNOSIS — B96.89 ACUTE BACTERIAL PHARYNGITIS: Primary | ICD-10-CM

## 2019-06-15 LAB — BACTERIA THROAT CULT: NORMAL

## 2019-06-15 PROCEDURE — 1101F PT FALLS ASSESS-DOCD LE1/YR: CPT | Mod: CPTII,S$GLB,, | Performed by: FAMILY MEDICINE

## 2019-06-15 PROCEDURE — 99213 OFFICE O/P EST LOW 20 MIN: CPT | Mod: S$GLB,,, | Performed by: FAMILY MEDICINE

## 2019-06-15 PROCEDURE — 1101F PR PT FALLS ASSESS DOC 0-1 FALLS W/OUT INJ PAST YR: ICD-10-PCS | Mod: CPTII,S$GLB,, | Performed by: FAMILY MEDICINE

## 2019-06-15 PROCEDURE — 99213 PR OFFICE/OUTPT VISIT, EST, LEVL III, 20-29 MIN: ICD-10-PCS | Mod: S$GLB,,, | Performed by: FAMILY MEDICINE

## 2019-06-15 RX ORDER — AMOXICILLIN 875 MG/1
875 TABLET, FILM COATED ORAL 2 TIMES DAILY
Qty: 20 TABLET | Refills: 0 | Status: SHIPPED | OUTPATIENT
Start: 2019-06-15 | End: 2019-06-25

## 2019-06-15 NOTE — PATIENT INSTRUCTIONS
Resume your omeprazole daily as sometimes acid reflux presents as a persistent sore throat, hoarseness, or annoying cough.       Self-Care for Sore Throats    Sore throats happen for many reasons, such as colds, allergies, and infections caused by viruses or bacteria. In any case, your throat becomes red and sore. Your goal for self-care is to reduce your discomfort while giving your throat a chance to heal.  Moisten and soothe your throat  Tips include the following:  · Try a sip of water first thing after waking up.  · Keep your throat moist by drinking 6 or more glasses of clear liquids every day.  · Run a cool-air humidifier in your room overnight.  · Avoid cigarette smoke.   · Suck on throat lozenges, cough drops, hard candy, ice chips, or frozen fruit-juice bars. Use the sugar-free versions if your diet or medical condition requires them.  Gargle to ease irritation  Gargling every hour or 2 can ease irritation. Try gargling with 1 of these solutions:  · 1/4 teaspoon of salt in 1/2 cup of warm water  · An over-the-counter anesthetic gargle  Use medicine for more relief  Over-the-counter medicine can reduce sore throat symptoms. Ask your pharmacist if you have questions about which medicine to use:  · Ease pain with anesthetic sprays. Aspirin or an aspirin substitute also helps. Remember, never give aspirin to anyone 18 or younger, or if you are already taking blood thinners.   · For sore throats caused by allergies, try antihistamines to block the allergic reaction.  · Remember: unless a sore throat is caused by a bacterial infection, antibiotics wont help you.  Prevent future sore throats  Prevention tips include the following:  · Stop smoking or reduce contact with secondhand smoke. Smoke irritates the tender throat lining.  · Limit contact with pets and with allergy-causing substances, such as pollen and mold.  · When youre around someone with a sore throat or cold, wash your hands often to keep viruses  or bacteria from spreading.  · Dont strain your vocal cords.  Call your healthcare provider  Contact your healthcare provider if you have:  · A temperature over 101°F (38.3°C)  · White spots on the throat  · Great difficulty swallowing  · Trouble breathing  · A skin rash  · Recent exposure to someone else with strep bacteria  · Severe hoarseness and swollen glands in the neck or jaw   Date Last Reviewed: 8/1/2016  © 3686-7159 [employer]. [address] [place] [address], [place] [address]. All rights reserved. This information is not intended as a substitute for professional medical care. Always follow your healthcare professional's instructions.

## 2019-06-15 NOTE — PROGRESS NOTES
Subjective:       Patient ID: Ladonna St is a 83 y.o. female.    Vitals:  weight is 63.5 kg (140 lb). Her oral temperature is 99.4 °F (37.4 °C). Her blood pressure is 106/64 and her pulse is 76. Her oxygen saturation is 95%.     Chief Complaint: Cough    Cough   This is a new problem. The current episode started in the past 7 days. The problem has been gradually worsening. The problem occurs constantly. Associated symptoms include a sore throat. Pertinent negatives include no chills, ear pain, eye redness, fever, hemoptysis, myalgias, rash, shortness of breath or wheezing. Nothing aggravates the symptoms. The treatment provided no relief.       Constitution: Negative for chills, sweating, fatigue and fever.   HENT: Positive for sore throat. Negative for ear pain, congestion, sinus pain, sinus pressure and voice change.    Neck: Negative for painful lymph nodes.   Eyes: Negative for eye redness.   Respiratory: Positive for cough. Negative for chest tightness, sputum production, bloody sputum, COPD, shortness of breath, stridor, wheezing and asthma.    Gastrointestinal: Negative for nausea and vomiting.   Musculoskeletal: Negative for muscle ache.   Skin: Negative for rash.   Allergic/Immunologic: Negative for seasonal allergies and asthma.   Hematologic/Lymphatic: Negative for swollen lymph nodes.       Objective:      Physical Exam   Constitutional: She is oriented to person, place, and time. She appears well-developed and well-nourished. She is cooperative.  Non-toxic appearance. She does not appear ill. No distress.   HENT:   Head: Normocephalic and atraumatic.   Right Ear: Hearing, tympanic membrane, external ear and ear canal normal.   Left Ear: Hearing, tympanic membrane, external ear and ear canal normal.   Nose: Nose normal. No mucosal edema, rhinorrhea or nasal deformity. No epistaxis. Right sinus exhibits no maxillary sinus tenderness and no frontal sinus tenderness. Left sinus exhibits no  maxillary sinus tenderness and no frontal sinus tenderness.   Mouth/Throat: Uvula is midline, oropharynx is clear and moist and mucous membranes are normal. No trismus in the jaw. Normal dentition. No uvula swelling. No posterior oropharyngeal erythema.   Eyes: Conjunctivae and lids are normal. No scleral icterus.   Sclera clear bilat   Neck: Trachea normal, full passive range of motion without pain and phonation normal. Neck supple.   Cardiovascular: Normal rate, regular rhythm, normal heart sounds, intact distal pulses and normal pulses.   Pulmonary/Chest: Effort normal and breath sounds normal. No respiratory distress.   Abdominal: Soft. Normal appearance and bowel sounds are normal. She exhibits no distension. There is no tenderness.   Musculoskeletal: Normal range of motion. She exhibits no edema or deformity.   Neurological: She is alert and oriented to person, place, and time. She exhibits normal muscle tone. Coordination normal.   Skin: Skin is warm, dry and intact. She is not diaphoretic. No pallor.   Psychiatric: She has a normal mood and affect. Her speech is normal and behavior is normal. Judgment and thought content normal. Cognition and memory are normal.   Nursing note and vitals reviewed.      Assessment:       1. Acute bacterial pharyngitis        Plan:         Acute bacterial pharyngitis    Other orders  -     amoxicillin (AMOXIL) 875 MG tablet; Take 1 tablet (875 mg total) by mouth 2 (two) times daily. for 10 days  Dispense: 20 tablet; Refill: 0

## 2019-06-18 ENCOUNTER — TELEPHONE (OUTPATIENT)
Dept: URGENT CARE | Facility: CLINIC | Age: 84
End: 2019-06-18

## 2019-06-22 ENCOUNTER — HOSPITAL ENCOUNTER (OUTPATIENT)
Dept: RADIOLOGY | Facility: HOSPITAL | Age: 84
Discharge: HOME | End: 2019-06-22
Attending: FAMILY MEDICINE
Payer: MEDICARE

## 2019-06-22 DIAGNOSIS — Z12.39 ENCOUNTER FOR SPECIAL SCREENING EXAMINATION FOR NEOPLASM OF BREAST: ICD-10-CM

## 2019-06-22 PROCEDURE — 77063 BREAST TOMOSYNTHESIS BI: CPT | Mod: 26,HCNC,, | Performed by: RADIOLOGY

## 2019-06-22 PROCEDURE — 77063 MAMMO DIGITAL SCREENING LEFT WITH TOMOSYNTHESIS_CAD: ICD-10-PCS | Mod: 26,HCNC,, | Performed by: RADIOLOGY

## 2019-06-22 PROCEDURE — 77067 SCR MAMMO BI INCL CAD: CPT | Mod: 26,HCNC,, | Performed by: RADIOLOGY

## 2019-06-22 PROCEDURE — 77067 SCR MAMMO BI INCL CAD: CPT | Mod: TC,HCNC,PO

## 2019-06-22 PROCEDURE — 77067 MAMMO DIGITAL SCREENING LEFT WITH TOMOSYNTHESIS_CAD: ICD-10-PCS | Mod: 26,HCNC,, | Performed by: RADIOLOGY

## 2019-10-13 RX ORDER — OMEPRAZOLE 20 MG/1
CAPSULE, DELAYED RELEASE ORAL
Qty: 90 CAPSULE | Refills: 0 | Status: SHIPPED | OUTPATIENT
Start: 2019-10-13 | End: 2020-01-10

## 2019-10-16 ENCOUNTER — TELEPHONE (OUTPATIENT)
Dept: FAMILY MEDICINE | Facility: CLINIC | Age: 84
End: 2019-10-16

## 2019-10-16 NOTE — TELEPHONE ENCOUNTER
Please see message below. Pt could not make it to appt today because she got lost. Can we work pt in? Please advise.

## 2019-10-16 NOTE — TELEPHONE ENCOUNTER
----- Message from Monique Kauffman sent at 10/16/2019  9:13 AM CDT -----  Contact: Nicole  Patient's daughter called to state mom got lost on way to appt so will not make it today. Next available appt is 12/27, scheduled but hoping for an earlier re-schedule, please call if possible, thank you    [phone]- Nicole, daughter

## 2019-10-17 ENCOUNTER — TELEPHONE (OUTPATIENT)
Dept: FAMILY MEDICINE | Facility: CLINIC | Age: 84
End: 2019-10-17

## 2019-10-17 NOTE — TELEPHONE ENCOUNTER
----- Message from Adriana Coulter sent at 10/17/2019  9:51 AM CDT -----  Contact: Cam Rivera   Type:  Patient Returning Call    Who Called: Daughter Nicole   Who Left Message for Patient:  Janice  Does the patient know what this is regarding?: n/a  Best Call Back Number: [phone]

## 2019-10-23 ENCOUNTER — OFFICE VISIT (OUTPATIENT)
Dept: FAMILY MEDICINE | Facility: CLINIC | Age: 84
End: 2019-10-23
Payer: MEDICARE

## 2019-10-23 VITALS
HEIGHT: 68 IN | WEIGHT: 141.75 LBS | DIASTOLIC BLOOD PRESSURE: 86 MMHG | HEART RATE: 70 BPM | SYSTOLIC BLOOD PRESSURE: 120 MMHG | OXYGEN SATURATION: 96 % | BODY MASS INDEX: 21.48 KG/M2 | TEMPERATURE: 98 F

## 2019-10-23 DIAGNOSIS — Z00.00 ROUTINE HEALTH MAINTENANCE: Primary | ICD-10-CM

## 2019-10-23 DIAGNOSIS — Z13.6 ENCOUNTER FOR SCREENING FOR CARDIOVASCULAR DISORDERS: ICD-10-CM

## 2019-10-23 PROCEDURE — 99397 PR PREVENTIVE VISIT,EST,65 & OVER: ICD-10-PCS | Mod: 25,HCNC,S$GLB, | Performed by: FAMILY MEDICINE

## 2019-10-23 PROCEDURE — 99999 PR PBB SHADOW E&M-EST. PATIENT-LVL III: CPT | Mod: PBBFAC,HCNC,, | Performed by: FAMILY MEDICINE

## 2019-10-23 PROCEDURE — G0008 ADMIN INFLUENZA VIRUS VAC: HCPCS | Mod: HCNC,S$GLB,, | Performed by: FAMILY MEDICINE

## 2019-10-23 PROCEDURE — 99999 PR PBB SHADOW E&M-EST. PATIENT-LVL III: ICD-10-PCS | Mod: PBBFAC,HCNC,, | Performed by: FAMILY MEDICINE

## 2019-10-23 PROCEDURE — 90662 FLU VACCINE - HIGH DOSE (65+) PRESERVATIVE FREE IM: ICD-10-PCS | Mod: HCNC,S$GLB,, | Performed by: FAMILY MEDICINE

## 2019-10-23 PROCEDURE — 99397 PER PM REEVAL EST PAT 65+ YR: CPT | Mod: 25,HCNC,S$GLB, | Performed by: FAMILY MEDICINE

## 2019-10-23 PROCEDURE — 90662 IIV NO PRSV INCREASED AG IM: CPT | Mod: HCNC,S$GLB,, | Performed by: FAMILY MEDICINE

## 2019-10-23 PROCEDURE — G0008 FLU VACCINE - HIGH DOSE (65+) PRESERVATIVE FREE IM: ICD-10-PCS | Mod: HCNC,S$GLB,, | Performed by: FAMILY MEDICINE

## 2019-10-23 NOTE — PROGRESS NOTES
HPI  Ladonna St is a 83 y.o. female with multiple medical diagnoses as listed in the medical history and problem list that presents for Annual Exam  .      HPI  Here today for routine health maintenance.    PAST MEDICAL HISTORY:  Past Medical History:   Diagnosis Date    Anxiety     Breast cancer 1986    mastectomy right    GERD (gastroesophageal reflux disease)     Osteoporosis, unspecified        PAST SURGICAL HISTORY:  Past Surgical History:   Procedure Laterality Date    AUGMENTATION OF BREAST  1989    right only     BREAST BIOPSY Left 1987    benign    BREAST SURGERY Right 1989    implant repair after rupture    BREAST SURGERY Right     implant repair after rupture    CATARACT EXTRACTION W/  INTRAOCULAR LENS IMPLANT      bilateral    COLONOSCOPY  2011    polyp    dupuytrens      EYE SURGERY Bilateral     cataracts    MASTECTOMY Right 1986    TONSILLECTOMY         SOCIAL HISTORY:  Social History     Socioeconomic History    Marital status:      Spouse name: Not on file    Number of children: Not on file    Years of education: Not on file    Highest education level: Not on file   Occupational History    Not on file   Social Needs    Financial resource strain: Not on file    Food insecurity:     Worry: Not on file     Inability: Not on file    Transportation needs:     Medical: Not on file     Non-medical: Not on file   Tobacco Use    Smoking status: Former Smoker     Packs/day: 0.10     Years: 24.00     Pack years: 2.40     Types: Cigarettes     Last attempt to quit: 1983     Years since quittin.3    Smokeless tobacco: Never Used    Tobacco comment: quit 30 years ago   Substance and Sexual Activity    Alcohol use: Yes     Alcohol/week: 1.0 standard drinks     Types: 1 Glasses of wine per week     Comment: per day    Drug use: No    Sexual activity: Not Currently   Lifestyle    Physical activity:     Days per week: Not on file     Minutes per session: Not on  file    Stress: Not on file   Relationships    Social connections:     Talks on phone: Not on file     Gets together: Not on file     Attends Confucianist service: Not on file     Active member of club or organization: Not on file     Attends meetings of clubs or organizations: Not on file     Relationship status: Not on file   Other Topics Concern    Not on file   Social History Narrative    Not on file       FAMILY HISTORY:  Family History   Problem Relation Age of Onset    Cancer Maternal Aunt         colon    Breast cancer Maternal Aunt 50    Cancer Paternal Uncle         colon    Arthritis Mother     Parkinsonism Father     No Known Problems Sister     Henoch-Schonlein purpura Daughter     No Known Problems Son     No Known Problems Sister     Cancer Maternal Aunt         breast       ALLERGIES AND MEDICATIONS: updated and reviewed.  Review of patient's allergies indicates:   Allergen Reactions    Benadryl allergy decongestant      Other reaction(s): hyperactive    Sulfamethoxazole-trimethoprim      Other reaction(s): Rash     Current Outpatient Medications   Medication Sig Dispense Refill    amitriptyline (ELAVIL) 10 MG tablet TAKE 1 TABLET(10 MG) BY MOUTH EVERY NIGHT AS NEEDED FOR INSOMNIA 90 tablet 3    aspirin 325 MG tablet Take 325 mg by mouth once daily.      melatonin 1 mg Tab Take 3 mg by mouth. 1 Tablet Oral Every evening      multivitamin (ONE DAILY MULTIVITAMIN) per tablet Take 1 tablet by mouth once daily.      omeprazole (PRILOSEC) 20 MG capsule TAKE 1 CAPSULE(20 MG) BY MOUTH EVERY DAY 90 capsule 0     No current facility-administered medications for this visit.        ROS  Review of Systems   Constitutional: Negative for fatigue, fever and unexpected weight change.   HENT: Negative for congestion, hearing loss, rhinorrhea and sore throat.    Eyes: Negative for visual disturbance.   Respiratory: Negative for cough, chest tightness, shortness of breath and wheezing.   "  Cardiovascular: Negative for chest pain, palpitations and leg swelling.   Gastrointestinal: Negative for abdominal distention, abdominal pain, blood in stool, constipation, diarrhea, nausea and vomiting.   Genitourinary: Negative for difficulty urinating, dysuria, frequency, hematuria, menstrual problem, pelvic pain, urgency and vaginal bleeding.   Musculoskeletal: Negative for back pain, joint swelling and neck pain.   Skin: Negative for rash.   Neurological: Negative for dizziness, tremors, weakness, light-headedness, numbness and headaches.   Psychiatric/Behavioral: Negative for confusion, dysphoric mood and sleep disturbance. The patient is not nervous/anxious.        Physical Exam  Vitals:    10/23/19 1423   BP: 120/86   Pulse: 70   Temp: 98 °F (36.7 °C)    Body mass index is 21.55 kg/m².  Weight: 64.3 kg (141 lb 12.1 oz)   Height: 5' 8" (172.7 cm)     Physical Exam   Constitutional: She is oriented to person, place, and time. She appears well-developed and well-nourished.   HENT:   Head: Normocephalic and atraumatic.   Right Ear: External ear normal.   Left Ear: External ear normal.   Nose: Nose normal.   Mouth/Throat: Oropharynx is clear and moist.   Eyes: Pupils are equal, round, and reactive to light. Conjunctivae and EOM are normal. No scleral icterus.   Neck: Normal range of motion. Neck supple. No JVD present. No thyromegaly present.   Cardiovascular: Normal rate, regular rhythm and normal heart sounds. Exam reveals no gallop and no friction rub.   No murmur heard.  Pulmonary/Chest: Effort normal and breath sounds normal. She has no wheezes. She has no rales.   Abdominal: Soft. Bowel sounds are normal. She exhibits no distension and no mass. There is no tenderness. There is no rebound and no guarding.   Musculoskeletal: Normal range of motion. She exhibits no edema.   Lymphadenopathy:     She has no cervical adenopathy.   Neurological: She is alert and oriented to person, place, and time. She has " normal strength. No cranial nerve deficit or sensory deficit.   Skin: Skin is warm and dry. No rash noted.   Vitals reviewed.      Health Maintenance       Date Due Completion Date    Shingles Vaccine (1 of 2) 11/04/1987 ---    Influenza Vaccine (1) 09/01/2019 10/6/2018    DEXA SCAN 03/26/2022 3/26/2019    Override on 10/20/2015: Not Clinically Appropriate    Lipid Panel 07/02/2023 7/2/2018    TETANUS VACCINE 06/22/2026 6/22/2016 (Declined)    Override on 6/22/2016: Declined          Assessment & Plan    Routine health maintenance  - Health maintenance reviewed  - Diet and exercise education.    Encounter for screening for cardiovascular disorders  -     Comprehensive metabolic panel; Future; Expected date: 10/23/2019  -     Lipid panel; Future; Expected date: 10/23/2019        Follow up in about 1 year (around 10/23/2020).

## 2019-11-11 ENCOUNTER — TELEPHONE (OUTPATIENT)
Dept: GASTROENTEROLOGY | Facility: CLINIC | Age: 84
End: 2019-11-11

## 2019-11-11 NOTE — TELEPHONE ENCOUNTER
Spoke with pt. Scheduled c-scope. Pt verbalized understanding.   Prep instructions & reminder letter placed in mail.

## 2019-11-11 NOTE — TELEPHONE ENCOUNTER
----- Message from June Seth sent at 11/9/2019 10:12 AM CST -----  Contact: PATIENT  Type:  Sooner Apoointment Request    Caller is requesting a sooner appointment.    Name of Caller:  Ladonna St  When is the first available appointment?  Unsure for colonoscopy  Symptoms:  NA  Best Call Back Number: [phone]  Additional Information:  Calling to [facility] due to recall letter

## 2019-12-05 ENCOUNTER — HOSPITAL ENCOUNTER (OUTPATIENT)
Dept: RADIOLOGY | Facility: HOSPITAL | Age: 84
Discharge: HOME | End: 2019-12-05
Attending: PHYSICIAN ASSISTANT
Payer: MEDICARE

## 2019-12-05 ENCOUNTER — OFFICE VISIT (OUTPATIENT)
Dept: FAMILY MEDICINE | Facility: CLINIC | Age: 84
End: 2019-12-05
Payer: MEDICARE

## 2019-12-05 ENCOUNTER — ANCILLARY ORDERS (OUTPATIENT)
Dept: FAMILY MEDICINE | Facility: CLINIC | Age: 84
End: 2019-12-05

## 2019-12-05 VITALS
WEIGHT: 143.06 LBS | OXYGEN SATURATION: 95 % | DIASTOLIC BLOOD PRESSURE: 78 MMHG | BODY MASS INDEX: 21.76 KG/M2 | SYSTOLIC BLOOD PRESSURE: 128 MMHG | HEART RATE: 70 BPM

## 2019-12-05 DIAGNOSIS — R07.89 MUSCULOSKELETAL CHEST PAIN: ICD-10-CM

## 2019-12-05 DIAGNOSIS — Z98.82 S/P SALINE BREAST IMPLANT: ICD-10-CM

## 2019-12-05 DIAGNOSIS — N64.4 BREAST PAIN, RIGHT: ICD-10-CM

## 2019-12-05 DIAGNOSIS — N64.4 BREAST PAIN, RIGHT: Primary | ICD-10-CM

## 2019-12-05 DIAGNOSIS — R07.89 MUSCULOSKELETAL CHEST PAIN: Primary | ICD-10-CM

## 2019-12-05 PROCEDURE — 99999 PR PBB SHADOW E&M-EST. PATIENT-LVL III: CPT | Mod: PBBFAC,HCNC,, | Performed by: PHYSICIAN ASSISTANT

## 2019-12-05 PROCEDURE — 1159F PR MEDICATION LIST DOCUMENTED IN MEDICAL RECORD: ICD-10-PCS | Mod: HCNC,S$GLB,, | Performed by: PHYSICIAN ASSISTANT

## 2019-12-05 PROCEDURE — 1125F AMNT PAIN NOTED PAIN PRSNT: CPT | Mod: HCNC,S$GLB,, | Performed by: PHYSICIAN ASSISTANT

## 2019-12-05 PROCEDURE — 77065 DX MAMMO INCL CAD UNI: CPT | Mod: 26,HCNC,, | Performed by: RADIOLOGY

## 2019-12-05 PROCEDURE — 99999 PR PBB SHADOW E&M-EST. PATIENT-LVL III: ICD-10-PCS | Mod: PBBFAC,HCNC,, | Performed by: PHYSICIAN ASSISTANT

## 2019-12-05 PROCEDURE — 99214 OFFICE O/P EST MOD 30 MIN: CPT | Mod: HCNC,S$GLB,, | Performed by: PHYSICIAN ASSISTANT

## 2019-12-05 PROCEDURE — 77065 MAMMO DIGITAL DIAGNOSTIC RIGHT WITH TOMOSYNTHESIS_CAD: ICD-10-PCS | Mod: 26,HCNC,, | Performed by: RADIOLOGY

## 2019-12-05 PROCEDURE — 77061 BREAST TOMOSYNTHESIS UNI: CPT | Mod: 26,HCNC,, | Performed by: RADIOLOGY

## 2019-12-05 PROCEDURE — 71100 XR RIBS 2 VIEW RIGHT: ICD-10-PCS | Mod: 26,HCNC,RT, | Performed by: RADIOLOGY

## 2019-12-05 PROCEDURE — 77061 BREAST TOMOSYNTHESIS UNI: CPT | Mod: TC,HCNC,PO

## 2019-12-05 PROCEDURE — 1125F PR PAIN SEVERITY QUANTIFIED, PAIN PRESENT: ICD-10-PCS | Mod: HCNC,S$GLB,, | Performed by: PHYSICIAN ASSISTANT

## 2019-12-05 PROCEDURE — 77061 MAMMO DIGITAL DIAGNOSTIC RIGHT WITH TOMOSYNTHESIS_CAD: ICD-10-PCS | Mod: 26,HCNC,, | Performed by: RADIOLOGY

## 2019-12-05 PROCEDURE — 71100 X-RAY EXAM RIBS UNI 2 VIEWS: CPT | Mod: TC,HCNC,FY,PO,RT

## 2019-12-05 PROCEDURE — 71100 X-RAY EXAM RIBS UNI 2 VIEWS: CPT | Mod: 26,HCNC,RT, | Performed by: RADIOLOGY

## 2019-12-05 PROCEDURE — 77065 DX MAMMO INCL CAD UNI: CPT | Mod: TC,HCNC,PO

## 2019-12-05 PROCEDURE — 1101F PR PT FALLS ASSESS DOC 0-1 FALLS W/OUT INJ PAST YR: ICD-10-PCS | Mod: HCNC,CPTII,S$GLB, | Performed by: PHYSICIAN ASSISTANT

## 2019-12-05 PROCEDURE — 1101F PT FALLS ASSESS-DOCD LE1/YR: CPT | Mod: HCNC,CPTII,S$GLB, | Performed by: PHYSICIAN ASSISTANT

## 2019-12-05 PROCEDURE — 99214 PR OFFICE/OUTPT VISIT, EST, LEVL IV, 30-39 MIN: ICD-10-PCS | Mod: HCNC,S$GLB,, | Performed by: PHYSICIAN ASSISTANT

## 2019-12-05 PROCEDURE — 1159F MED LIST DOCD IN RCRD: CPT | Mod: HCNC,S$GLB,, | Performed by: PHYSICIAN ASSISTANT

## 2019-12-05 RX ORDER — MELOXICAM 15 MG/1
15 TABLET ORAL DAILY
Qty: 10 TABLET | Refills: 0 | Status: SHIPPED | OUTPATIENT
Start: 2019-12-05 | End: 2020-05-29

## 2019-12-05 NOTE — PROGRESS NOTES
Subjective:       Patient ID: Ladonna St is a 84 y.o. female    Chief Complaint: Pain (right breast)    HPI  The patient is new to me, PCP is Dr. Houser.  She presents today complaining of right breast pain that began 2 nights ago.  She denies any injury or triggering event.  She has a history of breast cancer in the right breast with mastectomy and saline implant.  She has had this see the implant since the 80s.  Her pain is worse with certain positions and lying down at night.  She likes to sleep on the right side.  She denies any fevers, no breast erythema, no rash, the breast is not changed in size.    Review of Systems   Constitutional: Negative for activity change, chills and fever.   HENT: Negative for congestion and sore throat.    Eyes: Negative for visual disturbance.   Respiratory: Negative for cough and shortness of breath.    Cardiovascular: Negative for chest pain and palpitations.   Gastrointestinal: Negative for abdominal pain, diarrhea, nausea and vomiting.   Endocrine: Negative for polydipsia and polyuria.   Musculoskeletal: Negative for myalgias.        RIGHT BREAST PAIN   Skin: Negative for rash.   Neurological: Negative for headaches.   Psychiatric/Behavioral: The patient is not nervous/anxious.         Objective:   Physical Exam   Constitutional: She is oriented to person, place, and time. She appears well-developed and well-nourished. No distress.   HENT:   Head: Normocephalic and atraumatic.   Eyes: Pupils are equal, round, and reactive to light. EOM are normal.   Neck: Normal range of motion. Neck supple.   Cardiovascular: Normal rate, regular rhythm, normal heart sounds and intact distal pulses. Exam reveals no gallop and no friction rub.   No murmur heard.  Pulmonary/Chest: Effort normal and breath sounds normal. No respiratory distress. She has no wheezes. She has no rales. She exhibits no tenderness.   Abdominal: Soft. Bowel sounds are normal. She exhibits no distension and no  mass. There is no tenderness. There is no guarding.   Musculoskeletal: Normal range of motion.   Right breast shows an implant, no obvious abnormality palpated, no mass, there is tenderness to palpation over the right chest wall, no edema, no erythema, no rashes   Neurological: She is alert and oriented to person, place, and time.   Skin: Skin is warm and dry. No rash noted. She is not diaphoretic.   Psychiatric: She has a normal mood and affect. Her behavior is normal. Judgment and thought content normal.        Assessment:       1. Breast pain, right  US Breast Bilateral Limited    X-Ray Ribs 2 View Right    Mammo Digital Diagnostic Right With CAD   2. S/P saline breast implant  US Breast Bilateral Limited    Mammo Digital Diagnostic Right With CAD        Plan:       Breast pain, right  -     US Breast Bilateral Limited; Future; Expected date: 12/05/2019  -     X-Ray Ribs 2 View Right; Future; Expected date: 12/05/2019  -     Mammo Digital Diagnostic Right With CAD; Future; Expected date: 12/05/2019    S/P saline breast implant  -     US Breast Bilateral Limited; Future; Expected date: 12/05/2019  -     Mammo Digital Diagnostic Right With CAD; Future; Expected date: 12/05/2019

## 2019-12-06 RX ORDER — MELOXICAM 15 MG/1
TABLET ORAL
Qty: 90 TABLET | Refills: 0 | OUTPATIENT
Start: 2019-12-06

## 2019-12-27 ENCOUNTER — TELEPHONE (OUTPATIENT)
Dept: ENDOSCOPY | Facility: HOSPITAL | Age: 84
End: 2019-12-27

## 2019-12-27 NOTE — TELEPHONE ENCOUNTER
Pt left message here in [facility] stating that she received paperwork saying that she was scheduled for a procedure on 1/28/20.  She stated that Dr. Polk did not tell her that she needed to have anything done so she was questioning if he ordered the test and if it was necessary.  Please call her at your earliest convenience.  Thank you. NM

## 2019-12-27 NOTE — TELEPHONE ENCOUNTER
Spoke with pt. Clarified why she was scheduled for c-scope (from recall letter from 2014 c-scope). Pt verbalized understanding & states she has received so much mail lately she had forgotten she called clinic to schedule. Pt stated she has all information pertaining c-scope & will keep scheduled date.

## 2019-12-31 ENCOUNTER — PES CALL (OUTPATIENT)
Dept: ADMINISTRATIVE | Facility: CLINIC | Age: 84
End: 2019-12-31

## 2020-01-10 RX ORDER — OMEPRAZOLE 20 MG/1
CAPSULE, DELAYED RELEASE ORAL
Qty: 90 CAPSULE | Refills: 1 | Status: SHIPPED | OUTPATIENT
Start: 2020-01-10 | End: 2020-07-17

## 2020-01-10 NOTE — PROGRESS NOTES
Refill Authorization Note     is requesting a refill authorization.    Brief assessment and rationale for refill: APPROVE: prr                                         Comments:   Requested Prescriptions   Pending Prescriptions Disp Refills    omeprazole (PRILOSEC) 20 MG capsule [Pharmacy Med Name: OMEPRAZOLE 20MG CAPSULES] 90 capsule 1     Sig: TAKE 1 CAPSULE(20 MG) BY MOUTH EVERY DAY       Gastroenterology: Proton Pump Inhibitors Passed - 1/9/2020 10:10 AM        Passed - Patient is at least 18 years old        Passed - Osteoporosis is not on problem list        Passed - Plavix is not on active medication list        Passed - An appropriate indication is on the problem list        Passed - Office visit in past 6 months or future 90 days.     Recent Outpatient Visits            1 month ago Breast pain, right    [facility] Elda Dvaey PA-C    2 months ago Routine health maintenance    [facility] Pedro Houser MD    6 months ago Acute bacterial pharyngitis    [facility] Edouard Lopez MD    7 months ago Pharyngitis, unspecified etiology    [facility] Anya Asif PA-C    9 months ago Encounter for preventive health examination    [facility] Mary Wilkerson, NEREIDA

## 2020-01-17 RX ORDER — AMITRIPTYLINE HYDROCHLORIDE 10 MG/1
TABLET, FILM COATED ORAL
Qty: 90 TABLET | Refills: 1 | Status: SHIPPED | OUTPATIENT
Start: 2020-01-17 | End: 2020-08-24

## 2020-01-17 NOTE — PROGRESS NOTES
Refill Authorization Note     is requesting a refill authorization.    Brief assessment and rationale for refill: APPROVE: prr                                         Comments:     Requested Prescriptions   Signed Prescriptions Disp Refills    amitriptyline (ELAVIL) 10 MG tablet 90 tablet 1     Sig: TAKE 1 TABLET(10 MG) BY MOUTH EVERY NIGHT AS NEEDED FOR INSOMNIA       Psychiatry:  Antidepressants - Heterocyclics (TCAs) Passed - 1/17/2020 11:34 AM        Passed - Patient is at least 18 years old        Passed - Office visit in past 6 months or future 90 days     Recent Outpatient Visits            1 month ago Breast pain, right    [facility] Elda Davey PA-C    2 months ago Routine health maintenance    [facility] Pedro Houser MD    7 months ago Acute bacterial pharyngitis    [facility]    7 months ago Pharyngitis, unspecified etiology    [facility] Anya Asif PA-C    10 months ago Encounter for preventive health examination    [facility] Mary Wilkerson NP          Future Appointments              In 2 weeks ANNUAL WELLNESS VISIT-NURSE PRACTITIONER, [place] [facility]

## 2020-01-23 ENCOUNTER — TELEPHONE (OUTPATIENT)
Dept: GASTROENTEROLOGY | Facility: CLINIC | Age: 85
End: 2020-01-23

## 2020-01-23 NOTE — TELEPHONE ENCOUNTER
----- Message from Hong Rivera sent at 1/23/2020  1:55 PM CST -----  Contact: Daughter, Nicole Rivera want to speak with a nurse regarding rescheduling appointment please call back at [phone] (home)

## 2020-01-28 ENCOUNTER — HOSPITAL ENCOUNTER (OUTPATIENT)
Facility: HOSPITAL | Age: 85
Discharge: HOME | End: 2020-01-28
Attending: INTERNAL MEDICINE | Admitting: INTERNAL MEDICINE
Payer: MEDICARE

## 2020-01-28 ENCOUNTER — ANESTHESIA EVENT (OUTPATIENT)
Dept: ENDOSCOPY | Facility: HOSPITAL | Age: 85
End: 2020-01-28
Payer: MEDICARE

## 2020-01-28 ENCOUNTER — ANESTHESIA (OUTPATIENT)
Dept: ENDOSCOPY | Facility: HOSPITAL | Age: 85
End: 2020-01-28
Payer: MEDICARE

## 2020-01-28 DIAGNOSIS — Z12.11 SCREEN FOR COLON CANCER: ICD-10-CM

## 2020-01-28 PROCEDURE — 63600175 PHARM REV CODE 636 W HCPCS: Mod: HCNC,PO | Performed by: INTERNAL MEDICINE

## 2020-01-28 PROCEDURE — G0121 COLON CA SCRN NOT HI RSK IND: HCPCS | Mod: HCNC,PO | Performed by: INTERNAL MEDICINE

## 2020-01-28 PROCEDURE — G0105 COLORECTAL SCRN; HI RISK IND: HCPCS | Mod: HCNC,PO | Performed by: INTERNAL MEDICINE

## 2020-01-28 PROCEDURE — 63600175 PHARM REV CODE 636 W HCPCS: Mod: HCNC,PO | Performed by: NURSE ANESTHETIST, CERTIFIED REGISTERED

## 2020-01-28 PROCEDURE — 37000008 HC ANESTHESIA 1ST 15 MINUTES: Mod: HCNC,PO | Performed by: INTERNAL MEDICINE

## 2020-01-28 PROCEDURE — D9220A PRA ANESTHESIA: Mod: HCNC,CRNA,, | Performed by: NURSE ANESTHETIST, CERTIFIED REGISTERED

## 2020-01-28 PROCEDURE — D9220A PRA ANESTHESIA: ICD-10-PCS | Mod: HCNC,ANES,, | Performed by: ANESTHESIOLOGY

## 2020-01-28 PROCEDURE — D9220A PRA ANESTHESIA: ICD-10-PCS | Mod: HCNC,CRNA,, | Performed by: NURSE ANESTHETIST, CERTIFIED REGISTERED

## 2020-01-28 PROCEDURE — 37000009 HC ANESTHESIA EA ADD 15 MINS: Mod: HCNC,PO | Performed by: INTERNAL MEDICINE

## 2020-01-28 PROCEDURE — D9220A PRA ANESTHESIA: Mod: HCNC,ANES,, | Performed by: ANESTHESIOLOGY

## 2020-01-28 PROCEDURE — G0105 COLORECTAL SCRN; HI RISK IND: HCPCS | Mod: HCNC,,, | Performed by: INTERNAL MEDICINE

## 2020-01-28 PROCEDURE — G0105 COLORECTAL SCRN; HI RISK IND: ICD-10-PCS | Mod: HCNC,,, | Performed by: INTERNAL MEDICINE

## 2020-01-28 RX ORDER — SODIUM CHLORIDE, SODIUM LACTATE, POTASSIUM CHLORIDE, CALCIUM CHLORIDE 600; 310; 30; 20 MG/100ML; MG/100ML; MG/100ML; MG/100ML
INJECTION, SOLUTION INTRAVENOUS CONTINUOUS
Status: DISCONTINUED | OUTPATIENT
Start: 2020-01-28 | End: 2020-01-28 | Stop reason: HOSPADM

## 2020-01-28 RX ORDER — LIDOCAINE HCL/PF 100 MG/5ML
SYRINGE (ML) INTRAVENOUS
Status: DISCONTINUED | OUTPATIENT
Start: 2020-01-28 | End: 2020-01-28

## 2020-01-28 RX ORDER — PROPOFOL 10 MG/ML
VIAL (ML) INTRAVENOUS
Status: DISCONTINUED | OUTPATIENT
Start: 2020-01-28 | End: 2020-01-28

## 2020-01-28 RX ORDER — SODIUM CHLORIDE 0.9 % (FLUSH) 0.9 %
10 SYRINGE (ML) INJECTION
Status: DISCONTINUED | OUTPATIENT
Start: 2020-01-28 | End: 2020-01-28 | Stop reason: HOSPADM

## 2020-01-28 RX ADMIN — PROPOFOL 20 MG: 10 INJECTION, EMULSION INTRAVENOUS at 09:01

## 2020-01-28 RX ADMIN — LIDOCAINE HYDROCHLORIDE 100 MG: 20 INJECTION, SOLUTION INTRAVENOUS at 09:01

## 2020-01-28 RX ADMIN — SODIUM CHLORIDE, SODIUM LACTATE, POTASSIUM CHLORIDE, AND CALCIUM CHLORIDE: .6; .31; .03; .02 INJECTION, SOLUTION INTRAVENOUS at 08:01

## 2020-01-28 RX ADMIN — PROPOFOL 100 MG: 10 INJECTION, EMULSION INTRAVENOUS at 09:01

## 2020-01-28 NOTE — TRANSFER OF CARE
"Anesthesia Transfer of Care Note    Patient: Ladonna St    Procedure(s) Performed: Procedure(s) (LRB):  COLONOSCOPY (N/A)    Patient location: PACU    Anesthesia Type: general    Transport from OR: Transported from OR on room air with adequate spontaneous ventilation    Post pain: adequate analgesia    Post assessment: no apparent anesthetic complications and tolerated procedure well    Post vital signs: stable    Level of consciousness: sedated    Nausea/Vomiting: no nausea/vomiting    Complications: none    Transfer of care protocol was followed      Last vitals:   Visit Vitals  BP (!) 158/80 (BP Location: Right arm, Patient Position: Lying)   Pulse 71   Temp 36.2 °C (97.1 °F) (Skin)   Resp 16   Ht 5' 8" (1.727 m)   Wt 64 kg (141 lb)   SpO2 100%   Breastfeeding? No   BMI 21.44 kg/m²     "

## 2020-01-28 NOTE — [employer] PATIENT INSTRUCTIONS
Discharge Summary/Instructions after an Endoscopic Procedure  Patient Name: Ladonna St  Patient MRN: [id number]  Patient YOB: 1935 Tuesday, January 28, 2020  Papo Akers MD  RESTRICTIONS:  During your procedure today, you received medications for sedation.  These   medications may affect your judgment, balance and coordination.  Therefore,   for 24 hours, you have the following restrictions:   - DO NOT drive a car, operate machinery, make legal/financial decisions,   sign important papers or drink alcohol.    ACTIVITY:  Today: no heavy lifting, straining or running due to procedural   sedation/anesthesia.  The following day: return to full activity including work.  DIET:  Eat and drink normally unless instructed otherwise.     TREATMENT FOR COMMON SIDE EFFECTS:  - Mild abdominal pain, nausea, belching, bloating or excessive gas:  rest,   eat lightly and use a heating pad.  - Sore Throat: treat with throat lozenges and/or gargle with warm salt   water.  - Because air was used during the procedure, expelling large amounts of air   from your rectum or belching is normal.  - If a bowel prep was taken, you may not have a bowel movement for 1-3 days.    This is normal.  SYMPTOMS TO WATCH FOR AND REPORT TO YOUR PHYSICIAN:  1. Abdominal pain or bloating, other than gas cramps.  2. Chest pain.  3. Back pain.  4. Signs of infection such as: chills or fever occurring within 24 hours   after the procedure.  5. Rectal bleeding, which would show as bright red, maroon, or black stools.   (A tablespoon of blood from the rectum is not serious, especially if   hemorrhoids are present.)  6. Vomiting.  7. Weakness or dizziness.  GO DIRECTLY TO THE NEAREST EMERGENCY ROOM IF YOU HAVE ANY OF THE FOLLOWING:      Difficulty breathing              Chills and/or fever over 101 F   Persistent vomiting and/or vomiting blood   Severe abdominal pain   Severe chest pain   Black, tarry stools   Bleeding- more than one  tablespoon   Any other symptom or condition that you feel may need urgent attention  Your doctor recommends these additional instructions:  If any biopsies were taken, your doctors clinic will contact you in 1 to 2   weeks with any results.  Your physician has indicated that a repeat colonoscopy is not recommended   for surveillance.   You are being discharged to home.  For questions, problems or results please call your physician - Papo Akers MD at Work:  [phone].  EMERGENCY PHONE NUMBER: [phone], LAB RESULTS: [phone]  IF A COMPLICATION OR EMERGENCY SITUATION ARISES AND YOU ARE UNABLE TO REACH   YOUR PHYSICIAN - GO DIRECTLY TO THE EMERGENCY ROOM.  ___________________________________________  Nurse Signature  ___________________________________________  Patient/Designated Responsible Party Signature  Papo Akers MD  1/28/2020 9:41:39 AM  This report has been verified and signed electronically.  [employer]

## 2020-01-28 NOTE — H&P
History & Physical - Short Stay  Gastroenterology      SUBJECTIVE:     Procedure: Colonoscopy    Chief Complaint/Indication for Procedure: Previous Polyps    PTA Medications   Medication Sig    amitriptyline (ELAVIL) 10 MG tablet TAKE 1 TABLET(10 MG) BY MOUTH EVERY NIGHT AS NEEDED FOR INSOMNIA    aspirin 325 MG tablet Take 325 mg by mouth once daily.    melatonin 1 mg Tab Take 3 mg by mouth. 1 Tablet Oral Every evening    multivitamin (ONE DAILY MULTIVITAMIN) per tablet Take 1 tablet by mouth once daily.    omeprazole (PRILOSEC) 20 MG capsule TAKE 1 CAPSULE(20 MG) BY MOUTH EVERY DAY    meloxicam (MOBIC) 15 MG tablet Take 1 tablet (15 mg total) by mouth once daily. (Patient not taking: Reported on 1/24/2020)       Review of patient's allergies indicates:   Allergen Reactions    Benadryl allergy decongestant      Other reaction(s): hyperactive    Sulfamethoxazole-trimethoprim      Other reaction(s): Rash        Past Medical History:   Diagnosis Date    Anxiety     Breast cancer 1986    mastectomy right    GERD (gastroesophageal reflux disease)     Osteoporosis, unspecified      Past Surgical History:   Procedure Laterality Date    AUGMENTATION OF BREAST  1989    right only     BREAST BIOPSY Left 1987    benign    BREAST SURGERY Right 1989    implant repair after rupture    BREAST SURGERY Right     implant repair after rupture    CATARACT EXTRACTION W/  INTRAOCULAR LENS IMPLANT      bilateral    COLONOSCOPY  2011    polyp    dupuytrens      EYE SURGERY Bilateral     cataracts    MASTECTOMY Right 1986    TONSILLECTOMY       Family History   Problem Relation Age of Onset    Cancer Maternal Aunt         colon    Breast cancer Maternal Aunt 50    Cancer Paternal Uncle         colon    Arthritis Mother     Parkinsonism Father     No Known Problems Sister     Henoch-Schonlein purpura Daughter     No Known Problems Son     No Known Problems Sister     Cancer Maternal Aunt         breast      Social History     Tobacco Use    Smoking status: Former Smoker     Packs/day: 0.10     Years: 24.00     Pack years: 2.40     Types: Cigarettes     Last attempt to quit: 1983     Years since quittin.6    Smokeless tobacco: Never Used    Tobacco comment: quit 30 years ago   Substance Use Topics    Alcohol use: Yes     Alcohol/week: 1.0 standard drinks     Types: 1 Glasses of wine per week     Comment: per day    Drug use: No         OBJECTIVE:     Vital Signs (Most Recent)       Physical Exam:                                                       GENERAL:  Comfortable, in no acute distress.                                 HEENT EXAM:  Nonicteric.  No adenopathy.  Oropharynx is clear.               NECK:  Supple.                                                               LUNGS:  Clear.                                                               CARDIAC:  Regular rate and rhythm.  S1, S2.  No murmur.                      ABDOMEN:  Soft, positive bowel sounds, nontender.  No hepatosplenomegaly or masses.  No rebound or guarding.                                             EXTREMITIES:  No edema.     MENTAL STATUS:  Normal, alert and oriented.      ASSESSMENT/PLAN:     Assessment: Previous Polyps    Plan: Colonoscopy    Anesthesia Plan: General    ASA Grade: ASA 2 - Patient with mild systemic disease with no functional limitations    MALLAMPATI SCORE:  I (soft palate, uvula, fauces, and tonsillar pillars visible)

## 2020-01-28 NOTE — ANESTHESIA POSTPROCEDURE EVALUATION
Anesthesia Post Evaluation    Patient: Ladonna St    Procedure(s) Performed: Procedure(s) (LRB):  COLONOSCOPY (N/A)    Final Anesthesia Type: general    Patient location during evaluation: PACU  Patient participation: Yes- Able to Participate  Level of consciousness: sedated and awake  Post-procedure vital signs: reviewed and stable  Pain management: adequate  Airway patency: patent    PONV status at discharge: No PONV  Anesthetic complications: no      Cardiovascular status: hypertensive and blood pressure returned to baseline  Respiratory status: spontaneous ventilation  Hydration status: euvolemic  Follow-up not needed.          Vitals Value Taken Time   /76 1/28/2020 10:15 AM   Temp 36.7 °C (98 °F) 1/28/2020 10:15 AM   Pulse 59 1/28/2020 10:15 AM   Resp 16 1/28/2020 10:15 AM   SpO2 100 % 1/28/2020 10:15 AM         Event Time     Out of Recovery 10:23:00          Pain/Tremaine Score: Tremaine Score: 10 (1/28/2020 10:15 AM)

## 2020-01-28 NOTE — ANESTHESIA PREPROCEDURE EVALUATION
01/28/2020  Ladonna St is a 84 y.o., female.    Anesthesia Evaluation    I have reviewed the Patient Summary Reports.    I have reviewed the Nursing Notes.   I have reviewed the Medications.     Review of Systems  Cardiovascular:  Cardiovascular Normal     Pulmonary:  Pulmonary Normal    Hepatic/GI:   GERD    Neurological:  Neurology Normal    Endocrine:  Endocrine Normal    Psych:  Psychiatric Normal           Physical Exam  General:  Well nourished    Airway/Jaw/Neck:  Airway Findings: Mouth Opening: Normal Tongue: Normal  General Airway Assessment: Adult  Mallampati: III  Improves to II with phonation.      Dental:  Dental Findings: In tact   Chest/Lungs:  Chest/Lungs Findings: Clear to auscultation, Normal Respiratory Rate         Mental Status:  Mental Status Findings:  Cooperative, Alert and Oriented         Anesthesia Plan  Type of Anesthesia, risks & benefits discussed:  Anesthesia Type:  general  Patient's Preference:   Intra-op Monitoring Plan: standard ASA monitors  Intra-op Monitoring Plan Comments:   Post Op Pain Control Plan:   Post Op Pain Control Plan Comments:   Induction:   IV  Beta Blocker:  Patient is not currently on a Beta-Blocker (No further documentation required).       Informed Consent: Patient understands risks and agrees with Anesthesia plan.  Questions answered. Anesthesia consent signed with patient.  ASA Score: 2     Day of Surgery Review of History & Physical:            Ready For Surgery From Anesthesia Perspective.

## 2020-01-28 NOTE — DISCHARGE SUMMARY
Discharge Note  Short Stay      SUMMARY     Admit Date: 1/28/2020    Attending Physician: Papo Akers MD     Discharge Physician: Papo Akers MD    Discharge Date: 1/28/2020 9:42 AM    Final Diagnosis: Colon cancer screening [Z12.11]    Disposition: HOME OR SELF CARE    Patient Instructions:   Current Discharge Medication List      CONTINUE these medications which have NOT CHANGED    Details   amitriptyline (ELAVIL) 10 MG tablet TAKE 1 TABLET(10 MG) BY MOUTH EVERY NIGHT AS NEEDED FOR INSOMNIA  Qty: 90 tablet, Refills: 1    Comments: Please inactivate all prior scripts with same name and strength including on holds.      aspirin 325 MG tablet Take 325 mg by mouth once daily.      melatonin 1 mg Tab Take 3 mg by mouth. 1 Tablet Oral Every evening      multivitamin (ONE DAILY MULTIVITAMIN) per tablet Take 1 tablet by mouth once daily.      omeprazole (PRILOSEC) 20 MG capsule TAKE 1 CAPSULE(20 MG) BY MOUTH EVERY DAY  Qty: 90 capsule, Refills: 1    Comments: Please delete all prior scripts with same name and strength including on holds.      meloxicam (MOBIC) 15 MG tablet Take 1 tablet (15 mg total) by mouth once daily.  Qty: 10 tablet, Refills: 0    Associated Diagnoses: Musculoskeletal chest pain             Discharge Procedure Orders (must include Diet, Follow-up, Activity)    Follow Up:  Follow up with PCP as previously scheduled  Resume routine diet.  Activity as tolerated.    No driving day of procedure.

## 2020-01-28 NOTE — DISCHARGE INSTRUCTIONS

## 2020-01-29 VITALS
HEART RATE: 59 BPM | RESPIRATION RATE: 16 BRPM | TEMPERATURE: 98 F | HEIGHT: 68 IN | SYSTOLIC BLOOD PRESSURE: 170 MMHG | DIASTOLIC BLOOD PRESSURE: 76 MMHG | WEIGHT: 141 LBS | OXYGEN SATURATION: 100 % | BODY MASS INDEX: 21.37 KG/M2

## 2020-01-31 ENCOUNTER — OFFICE VISIT (OUTPATIENT)
Dept: FAMILY MEDICINE | Facility: CLINIC | Age: 85
End: 2020-01-31
Payer: MEDICARE

## 2020-01-31 VITALS
WEIGHT: 144.19 LBS | SYSTOLIC BLOOD PRESSURE: 136 MMHG | HEART RATE: 67 BPM | DIASTOLIC BLOOD PRESSURE: 84 MMHG | BODY MASS INDEX: 21.85 KG/M2 | OXYGEN SATURATION: 96 % | HEIGHT: 68 IN

## 2020-01-31 DIAGNOSIS — F41.9 ANXIETY: ICD-10-CM

## 2020-01-31 DIAGNOSIS — K21.9 GASTROESOPHAGEAL REFLUX DISEASE WITHOUT ESOPHAGITIS: ICD-10-CM

## 2020-01-31 DIAGNOSIS — Z00.00 ENCOUNTER FOR PREVENTIVE HEALTH EXAMINATION: Primary | ICD-10-CM

## 2020-01-31 PROCEDURE — 99999 PR PBB SHADOW E&M-EST. PATIENT-LVL IV: CPT | Mod: PBBFAC,HCNC,, | Performed by: NURSE PRACTITIONER

## 2020-01-31 PROCEDURE — G0439 PR MEDICARE ANNUAL WELLNESS SUBSEQUENT VISIT: ICD-10-PCS | Mod: HCNC,S$GLB,, | Performed by: NURSE PRACTITIONER

## 2020-01-31 PROCEDURE — G0439 PPPS, SUBSEQ VISIT: HCPCS | Mod: HCNC,S$GLB,, | Performed by: NURSE PRACTITIONER

## 2020-01-31 PROCEDURE — 99999 PR PBB SHADOW E&M-EST. PATIENT-LVL IV: ICD-10-PCS | Mod: PBBFAC,HCNC,, | Performed by: NURSE PRACTITIONER

## 2020-01-31 NOTE — PROGRESS NOTES
"Ladonna St presented for a  Medicare AWV and comprehensive Health Risk Assessment today. The following components were reviewed and updated:    · Medical history  · Family History  · Social history  · Allergies and Current Medications  · Health Risk Assessment  · Health Maintenance  · Care Team     ** See Completed Assessments for Annual Wellness Visit within the encounter summary.**     The following assessments were completed:  · Living Situation  · CAGE  · Depression Screening  · Timed Get Up and Go  · Whisper Test  · Cognitive Function Screening      · Nutrition Screening  · ADL Screening  · PAQ Screening    Vitals:    01/31/20 1157   BP: 136/84   BP Location: Left arm   Patient Position: Sitting   BP Method: Medium (Manual)   Pulse: 67   SpO2: 96%   Weight: 65.4 kg (144 lb 2.9 oz)   Height: 5' 8" (1.727 m)     Body mass index is 21.92 kg/m².  Physical Exam   Constitutional: She is oriented to person, place, and time. She appears well-nourished.   Cardiovascular: Normal rate, regular rhythm, normal heart sounds and intact distal pulses.   Pulmonary/Chest: Effort normal and breath sounds normal.   Neurological: She is alert and oriented to person, place, and time.   Skin: Skin is warm and dry.   Vitals reviewed.      Diagnoses and health risks identified today and associated recommendations/orders:    1. Encounter for preventive health examination  Reviewed and discussed health maintenance.      2. Gastroesophageal reflux disease without esophagitis  Stable- continue current treatment and follow up routinely with PCP     3. Anxiety  Stable- continue current treatment and follow up routinely with PCP      Provided Ladonna with a 5-10 year written screening schedule and personal prevention plan. Recommendations were developed using the USPSTF age appropriate recommendations. Education, counseling, and referrals were provided as needed. After Visit Summary printed and given to patient which includes a list " of additional screenings\tests needed.      Mary Wilkerson, NP

## 2020-01-31 NOTE — PATIENT INSTRUCTIONS
Counseling and Referral of Other Preventative  (Italic type indicates deductible and co-insurance are waived)    Patient Name: Ladonna St  Today's Date: 1/31/2020    Health Maintenance       Date Due Completion Date    Shingles Vaccine (1 of 2) 11/04/1987 ---    DEXA SCAN 03/26/2022 3/26/2019    Override on 10/20/2015: Not Clinically Appropriate    Lipid Panel 10/24/2024 10/24/2019    TETANUS VACCINE 06/22/2026 6/22/2016 (Declined)    Override on 6/22/2016: Declined        No orders of the defined types were placed in this encounter.    The following information is provided to all patients.  This information is to help you find resources for any of the problems found today that may be affecting your health:                Living healthy guide: www.[facility].[place].gov      Understanding Diabetes: www.diabetes.org      Eating healthy: www.cdc.gov/healthyweight      CDC home safety checklist: www.cdc.gov/steadi/patient.html      Agency on Aging: www.goea.[place].[facility]      Alcoholics anonymous (AA): www.aa.org      Physical Activity: www.kash.nih.gov/wl8bcha      Tobacco use: www.quitwithusla.org

## 2020-02-20 ENCOUNTER — PATIENT OUTREACH (OUTPATIENT)
Dept: ADMINISTRATIVE | Facility: HOSPITAL | Age: 85
End: 2020-02-20

## 2020-02-20 NOTE — PROGRESS NOTES
Chart review performed.    Health Maintenance Due   Topic Date Due    Shingles Vaccine (1 of 2) 11/04/1987

## 2020-05-29 ENCOUNTER — OFFICE VISIT (OUTPATIENT)
Dept: FAMILY MEDICINE | Facility: CLINIC | Age: 85
End: 2020-05-29
Payer: MEDICARE

## 2020-05-29 VITALS
HEART RATE: 50 BPM | BODY MASS INDEX: 21.05 KG/M2 | WEIGHT: 138.88 LBS | DIASTOLIC BLOOD PRESSURE: 92 MMHG | TEMPERATURE: 98 F | HEIGHT: 68 IN | SYSTOLIC BLOOD PRESSURE: 166 MMHG

## 2020-05-29 DIAGNOSIS — G47.00 INSOMNIA, UNSPECIFIED TYPE: ICD-10-CM

## 2020-05-29 DIAGNOSIS — Z85.3 HISTORY OF BREAST CANCER: ICD-10-CM

## 2020-05-29 DIAGNOSIS — K21.9 GASTROESOPHAGEAL REFLUX DISEASE, ESOPHAGITIS PRESENCE NOT SPECIFIED: ICD-10-CM

## 2020-05-29 DIAGNOSIS — R03.0 ELEVATED BLOOD PRESSURE READING: ICD-10-CM

## 2020-05-29 DIAGNOSIS — J30.9 ALLERGIC RHINITIS, UNSPECIFIED SEASONALITY, UNSPECIFIED TRIGGER: Primary | ICD-10-CM

## 2020-05-29 PROCEDURE — 99499 RISK ADDL DX/OHS AUDIT: ICD-10-PCS | Mod: HCNC,S$GLB,, | Performed by: FAMILY MEDICINE

## 2020-05-29 PROCEDURE — 99999 PR PBB SHADOW E&M-EST. PATIENT-LVL IV: ICD-10-PCS | Mod: PBBFAC,HCNC,, | Performed by: FAMILY MEDICINE

## 2020-05-29 PROCEDURE — 1101F PR PT FALLS ASSESS DOC 0-1 FALLS W/OUT INJ PAST YR: ICD-10-PCS | Mod: HCNC,CPTII,S$GLB, | Performed by: FAMILY MEDICINE

## 2020-05-29 PROCEDURE — 1159F MED LIST DOCD IN RCRD: CPT | Mod: HCNC,S$GLB,, | Performed by: FAMILY MEDICINE

## 2020-05-29 PROCEDURE — 99499 UNLISTED E&M SERVICE: CPT | Mod: HCNC,S$GLB,, | Performed by: FAMILY MEDICINE

## 2020-05-29 PROCEDURE — 1101F PT FALLS ASSESS-DOCD LE1/YR: CPT | Mod: HCNC,CPTII,S$GLB, | Performed by: FAMILY MEDICINE

## 2020-05-29 PROCEDURE — 99214 PR OFFICE/OUTPT VISIT, EST, LEVL IV, 30-39 MIN: ICD-10-PCS | Mod: HCNC,S$GLB,, | Performed by: FAMILY MEDICINE

## 2020-05-29 PROCEDURE — 99999 PR PBB SHADOW E&M-EST. PATIENT-LVL IV: CPT | Mod: PBBFAC,HCNC,, | Performed by: FAMILY MEDICINE

## 2020-05-29 PROCEDURE — 1159F PR MEDICATION LIST DOCUMENTED IN MEDICAL RECORD: ICD-10-PCS | Mod: HCNC,S$GLB,, | Performed by: FAMILY MEDICINE

## 2020-05-29 PROCEDURE — 99214 OFFICE O/P EST MOD 30 MIN: CPT | Mod: HCNC,S$GLB,, | Performed by: FAMILY MEDICINE

## 2020-05-29 RX ORDER — FLUTICASONE PROPIONATE 50 MCG
2 SPRAY, SUSPENSION (ML) NASAL DAILY
Qty: 16 G | Refills: 11 | Status: SHIPPED | OUTPATIENT
Start: 2020-05-29 | End: 2020-11-04

## 2020-05-29 RX ORDER — LORATADINE 10 MG/1
10 TABLET ORAL DAILY
Qty: 30 TABLET | Refills: 11 | Status: SHIPPED | OUTPATIENT
Start: 2020-05-29 | End: 2020-11-04

## 2020-05-29 NOTE — PROGRESS NOTES
THIS DOCUMENT WAS MADE IN PART WITH VOICE RECOGNITION SOFTWARE.  OCCASIONALLY THIS SOFTWARE WILL MISINTERPRET WORDS OR PHRASES.    Assessment and Plan:    1. Allergic rhinitis, unspecified seasonality, unspecified trigger  - fluticasone propionate (FLONASE) 50 mcg/actuation nasal spray; 2 sprays (100 mcg total) by Each Nostril route once daily.  Dispense: 16 g; Refill: 11  - loratadine (CLARITIN) 10 mg tablet; Take 1 tablet (10 mg total) by mouth once daily.  Dispense: 30 tablet; Refill: 11      2. History of breast cancer  Asymptomatic    3. Elevated blood pressure reading  Check blood pressure at home, return if no improvement    4. Gastroesophageal reflux disease, esophagitis presence not specified  Asymptomatic, continue omeprazole    5. Insomnia, unspecified type  Stable      ______________________________________________________________________  Subjective:    Chief Complaint:  Chief Complaint   Patient presents with    Nasal Congestion        HPI:  Ladonna is a 84 y.o. year old     Est Care :     GERD : no symptoms with omeprazole     Insomnia / Anxiety  : taking melatonin and amitriptyline. No side effects. Working well.  passed 3 years ago.      History of right side breasts cancer : partial mastectomy in 1986.  No change in breast tissue to her knowledge. Still getting mammograms.     Elevated blood pressure today  Typically well controlled home  Denies any chest discomfort shortness breath    Nasal congestion, runny nose  Denies any shortness of breath, fever, cough.  Not taking any allergy medicines at this time  No sinus pressure, sore throat  Duration symptoms a few weeks          Past Medical History:  Past Medical History:   Diagnosis Date    Anxiety     Breast cancer 1986    mastectomy right    GERD (gastroesophageal reflux disease)     Osteoporosis, unspecified        Past Surgical History:  Past Surgical History:   Procedure Laterality Date    AUGMENTATION OF BREAST  1989    right  only     BREAST BIOPSY Left     benign    BREAST SURGERY Right     implant repair after rupture    BREAST SURGERY Right     implant repair after rupture    CATARACT EXTRACTION W/  INTRAOCULAR LENS IMPLANT      bilateral    COLONOSCOPY  2011    polyp    COLONOSCOPY N/A 2020    Procedure: COLONOSCOPY;  Surgeon: Papo Akers MD;  Location: [facility];  Service: Endoscopy;  Laterality: N/A;    dupuytrens      EYE SURGERY Bilateral     cataracts    MASTECTOMY Right 1986    TONSILLECTOMY         Family History:  Family History   Problem Relation Age of Onset    Cancer Maternal Aunt         colon    Breast cancer Maternal Aunt 50    Cancer Paternal Uncle         colon    Arthritis Mother     Parkinsonism Father     No Known Problems Sister     Henoch-Schonlein purpura Daughter     No Known Problems Son     No Known Problems Sister     Cancer Maternal Aunt         breast       Social History:  Social History     Socioeconomic History    Marital status:      Spouse name: Not on file    Number of children: Not on file    Years of education: Not on file    Highest education level: Not on file   Occupational History    Not on file   Social Needs    Financial resource strain: Not on file    Food insecurity:     Worry: Not on file     Inability: Not on file    Transportation needs:     Medical: Not on file     Non-medical: Not on file   Tobacco Use    Smoking status: Former Smoker     Packs/day: 0.10     Years: 24.00     Pack years: 2.40     Types: Cigarettes     Last attempt to quit: 1983     Years since quittin.9    Smokeless tobacco: Never Used    Tobacco comment: quit 30 years ago   Substance and Sexual Activity    Alcohol use: Yes     Alcohol/week: 1.0 standard drinks     Types: 1 Glasses of wine per week     Comment: per day    Drug use: No    Sexual activity: Not Currently   Lifestyle    Physical activity:     Days per week: Not on file     Minutes per  session: Not on file    Stress: Not on file   Relationships    Social connections:     Talks on phone: Not on file     Gets together: Not on file     Attends Jainism service: Not on file     Active member of club or organization: Not on file     Attends meetings of clubs or organizations: Not on file     Relationship status: Not on file   Other Topics Concern    Not on file   Social History Narrative    Not on file       Medications:  Current Outpatient Medications on File Prior to Visit   Medication Sig Dispense Refill    amitriptyline (ELAVIL) 10 MG tablet TAKE 1 TABLET(10 MG) BY MOUTH EVERY NIGHT AS NEEDED FOR INSOMNIA 90 tablet 1    aspirin 325 MG tablet Take 325 mg by mouth once daily.      folic acid/multivit-min/lutein (CENTRUM SILVER ORAL) Take by mouth once daily.      melatonin 1 mg Tab Take 3 mg by mouth. 1 Tablet Oral Every evening      omeprazole (PRILOSEC) 20 MG capsule TAKE 1 CAPSULE(20 MG) BY MOUTH EVERY DAY 90 capsule 1    [DISCONTINUED] meloxicam (MOBIC) 15 MG tablet Take 1 tablet (15 mg total) by mouth once daily. 10 tablet 0    [DISCONTINUED] multivitamin (ONE DAILY MULTIVITAMIN) per tablet Take 1 tablet by mouth once daily.       No current facility-administered medications on file prior to visit.        Allergies:  Benadryl allergy decongestant and Sulfamethoxazole-trimethoprim    Immunizations:  Immunization History   Administered Date(s) Administered    Influenza 10/03/2009, 10/04/2010, 11/10/2011    Influenza - High Dose - PF (65 years and older) 10/14/2015, 10/12/2016, 09/28/2017, 10/06/2018, 10/23/2019    Influenza - Trivalent (ADULT) 10/03/2009, 10/04/2010, 11/10/2011, 10/08/2013    Pneumococcal Conjugate - 13 Valent 10/12/2016    Pneumococcal Polysaccharide - 23 Valent 05/15/2018       Review of Systems:  Review of Systems   HENT: Positive for congestion and rhinorrhea.    All other systems reviewed and are negative.      Objective:    Vitals:  Vitals:    05/29/20 1320  "05/29/20 1325 05/29/20 1342 05/29/20 1610   BP: (!) 208/76 (!) 184/90 (!) 182/80 (!) 166/92   Pulse: (!) 50      Temp: 98 °F (36.7 °C)      TempSrc: Oral      Weight: 63 kg (138 lb 14.2 oz)      Height: 5' 8" (1.727 m)          Physical Exam   Constitutional: She appears well-developed. No distress.   HENT:   Head: Normocephalic and atraumatic.   Nose: Mucosal edema and rhinorrhea present.   Mouth/Throat: Posterior oropharyngeal erythema present. No oropharyngeal exudate.   Eyes: EOM are normal.   Neck: Normal range of motion. Neck supple.   Cardiovascular: Normal rate and regular rhythm.   Pulmonary/Chest: Effort normal and breath sounds normal. No respiratory distress.   Abdominal: Soft.   Skin: Skin is warm. No rash noted.   Psychiatric: She has a normal mood and affect. Her behavior is normal. Judgment and thought content normal.   Vitals reviewed.      Data:  No previous labs, imaging, or notes available.        Grzegorz Gutierrez MD  Family Medicine    "

## 2020-06-19 ENCOUNTER — OFFICE VISIT (OUTPATIENT)
Dept: FAMILY MEDICINE | Facility: CLINIC | Age: 85
End: 2020-06-19
Payer: MEDICARE

## 2020-06-19 VITALS
HEART RATE: 40 BPM | SYSTOLIC BLOOD PRESSURE: 178 MMHG | HEIGHT: 68 IN | TEMPERATURE: 98 F | DIASTOLIC BLOOD PRESSURE: 62 MMHG | WEIGHT: 139.44 LBS | BODY MASS INDEX: 21.13 KG/M2

## 2020-06-19 DIAGNOSIS — R00.1 BRADYCARDIA: ICD-10-CM

## 2020-06-19 DIAGNOSIS — I10 ESSENTIAL HYPERTENSION: ICD-10-CM

## 2020-06-19 DIAGNOSIS — M16.12 PRIMARY OSTEOARTHRITIS OF LEFT HIP: Primary | ICD-10-CM

## 2020-06-19 PROCEDURE — 1125F AMNT PAIN NOTED PAIN PRSNT: CPT | Mod: HCNC,S$GLB,, | Performed by: FAMILY MEDICINE

## 2020-06-19 PROCEDURE — 1101F PT FALLS ASSESS-DOCD LE1/YR: CPT | Mod: HCNC,CPTII,S$GLB, | Performed by: FAMILY MEDICINE

## 2020-06-19 PROCEDURE — 3077F PR MOST RECENT SYSTOLIC BLOOD PRESSURE >= 140 MM HG: ICD-10-PCS | Mod: HCNC,CPTII,S$GLB, | Performed by: FAMILY MEDICINE

## 2020-06-19 PROCEDURE — 3078F DIAST BP <80 MM HG: CPT | Mod: HCNC,CPTII,S$GLB, | Performed by: FAMILY MEDICINE

## 2020-06-19 PROCEDURE — 3078F PR MOST RECENT DIASTOLIC BLOOD PRESSURE < 80 MM HG: ICD-10-PCS | Mod: HCNC,CPTII,S$GLB, | Performed by: FAMILY MEDICINE

## 2020-06-19 PROCEDURE — 1125F PR PAIN SEVERITY QUANTIFIED, PAIN PRESENT: ICD-10-PCS | Mod: HCNC,S$GLB,, | Performed by: FAMILY MEDICINE

## 2020-06-19 PROCEDURE — 99214 PR OFFICE/OUTPT VISIT, EST, LEVL IV, 30-39 MIN: ICD-10-PCS | Mod: HCNC,S$GLB,, | Performed by: FAMILY MEDICINE

## 2020-06-19 PROCEDURE — 1101F PR PT FALLS ASSESS DOC 0-1 FALLS W/OUT INJ PAST YR: ICD-10-PCS | Mod: HCNC,CPTII,S$GLB, | Performed by: FAMILY MEDICINE

## 2020-06-19 PROCEDURE — 1159F MED LIST DOCD IN RCRD: CPT | Mod: HCNC,S$GLB,, | Performed by: FAMILY MEDICINE

## 2020-06-19 PROCEDURE — 99999 PR PBB SHADOW E&M-EST. PATIENT-LVL V: ICD-10-PCS | Mod: PBBFAC,HCNC,, | Performed by: FAMILY MEDICINE

## 2020-06-19 PROCEDURE — 99999 PR PBB SHADOW E&M-EST. PATIENT-LVL V: CPT | Mod: PBBFAC,HCNC,, | Performed by: FAMILY MEDICINE

## 2020-06-19 PROCEDURE — 99214 OFFICE O/P EST MOD 30 MIN: CPT | Mod: HCNC,S$GLB,, | Performed by: FAMILY MEDICINE

## 2020-06-19 PROCEDURE — 1159F PR MEDICATION LIST DOCUMENTED IN MEDICAL RECORD: ICD-10-PCS | Mod: HCNC,S$GLB,, | Performed by: FAMILY MEDICINE

## 2020-06-19 PROCEDURE — 3077F SYST BP >= 140 MM HG: CPT | Mod: HCNC,CPTII,S$GLB, | Performed by: FAMILY MEDICINE

## 2020-06-19 RX ORDER — AMLODIPINE BESYLATE 5 MG/1
5 TABLET ORAL NIGHTLY
Qty: 30 TABLET | Refills: 11 | Status: SHIPPED | OUTPATIENT
Start: 2020-06-19 | End: 2021-02-04

## 2020-06-19 RX ORDER — DICLOFENAC SODIUM 10 MG/G
2 GEL TOPICAL 3 TIMES DAILY PRN
Qty: 100 G | Refills: 3 | Status: SHIPPED | OUTPATIENT
Start: 2020-06-19 | End: 2020-11-04

## 2020-06-19 NOTE — PROGRESS NOTES
THIS DOCUMENT WAS MADE IN PART WITH VOICE RECOGNITION SOFTWARE.  OCCASIONALLY THIS SOFTWARE WILL MISINTERPRET WORDS OR PHRASES.    Assessment and Plan:    1. Primary osteoarthritis of left hip  - diclofenac sodium (VOLTAREN) 1 % Gel; Apply 2 g topically 3 (three) times daily as needed.  Dispense: 100 g; Refill: 3  - Ambulatory referral/consult to Physical/Occupational Therapy; Future    2. Essential hypertension  Check blood pressure and heart rate at home, follow-up in 2 weeks  - amLODIPine (NORVASC) 5 MG tablet; Take 1 tablet (5 mg total) by mouth every evening.  Dispense: 30 tablet; Refill: 11    3. Bradycardia  Asymptomatic        ______________________________________________________________________  Subjective:    Chief Complaint:  Chief Complaint   Patient presents with    left hip pain     past hip injury         HPI:  Ladonna is a 84 y.o. year old     84-year-old  female complains of left hip pain status post injury about 10 years ago  Reports recent flare up, no inciting event  Not taking any medications  Denies any fever  Reports pain with walking and standing on the lateral aspect of the left hip  Head injury tripping over suitcase about 12 years ago  Participate in physical therapy for a flare up approximately 1 year ago which did help    Elevated blood pressure  Sec visit in a row with blood pressure elevation  Did not check blood pressure at home  Notable bradycardia as well  Denies any exertional chest discomfort shortness of breath  Not currently on any antihypertensive  Denies presyncopal symptoms, fatigue, chest discomfort    Past Medical History:  Past Medical History:   Diagnosis Date    Anxiety     Breast cancer 1986    mastectomy right    GERD (gastroesophageal reflux disease)     Osteoporosis, unspecified        Past Surgical History:  Past Surgical History:   Procedure Laterality Date    AUGMENTATION OF BREAST  1989    right only     BREAST BIOPSY Left 1987    benign     BREAST SURGERY Right 1989    implant repair after rupture    BREAST SURGERY Right     implant repair after rupture    CATARACT EXTRACTION W/  INTRAOCULAR LENS IMPLANT      bilateral    COLONOSCOPY  2011    polyp    COLONOSCOPY N/A 2020    Procedure: COLONOSCOPY;  Surgeon: Papo Akers MD;  Location: [facility];  Service: Endoscopy;  Laterality: N/A;    dupuytrens      EYE SURGERY Bilateral     cataracts    MASTECTOMY Right 1986    TONSILLECTOMY         Family History:  Family History   Problem Relation Age of Onset    Cancer Maternal Aunt         colon    Breast cancer Maternal Aunt 50    Cancer Paternal Uncle         colon    Arthritis Mother     Parkinsonism Father     No Known Problems Sister     Henoch-Schonlein purpura Daughter     No Known Problems Son     No Known Problems Sister     Cancer Maternal Aunt         breast       Social History:  Social History     Socioeconomic History    Marital status:      Spouse name: Not on file    Number of children: Not on file    Years of education: Not on file    Highest education level: Not on file   Occupational History    Not on file   Social Needs    Financial resource strain: Not on file    Food insecurity     Worry: Not on file     Inability: Not on file    Transportation needs     Medical: Not on file     Non-medical: Not on file   Tobacco Use    Smoking status: Former Smoker     Packs/day: 0.10     Years: 24.00     Pack years: 2.40     Types: Cigarettes     Quit date: 1983     Years since quittin.0    Smokeless tobacco: Never Used    Tobacco comment: quit 30 years ago   Substance and Sexual Activity    Alcohol use: Yes     Alcohol/week: 1.0 standard drinks     Types: 1 Glasses of wine per week     Comment: per day    Drug use: No    Sexual activity: Not Currently   Lifestyle    Physical activity     Days per week: Not on file     Minutes per session: Not on file    Stress: Not on file   Relationships     Social connections     Talks on phone: Not on file     Gets together: Not on file     Attends Scientologist service: Not on file     Active member of club or organization: Not on file     Attends meetings of clubs or organizations: Not on file     Relationship status: Not on file   Other Topics Concern    Not on file   Social History Narrative    Not on file       Medications:  Current Outpatient Medications on File Prior to Visit   Medication Sig Dispense Refill    amitriptyline (ELAVIL) 10 MG tablet TAKE 1 TABLET(10 MG) BY MOUTH EVERY NIGHT AS NEEDED FOR INSOMNIA 90 tablet 1    aspirin 325 MG tablet Take 325 mg by mouth once daily.      fluticasone propionate (FLONASE) 50 mcg/actuation nasal spray 2 sprays (100 mcg total) by Each Nostril route once daily. 16 g 11    folic acid/multivit-min/lutein (CENTRUM SILVER ORAL) Take by mouth once daily.      loratadine (CLARITIN) 10 mg tablet Take 1 tablet (10 mg total) by mouth once daily. 30 tablet 11    melatonin 1 mg Tab Take 3 mg by mouth. 1 Tablet Oral Every evening      omeprazole (PRILOSEC) 20 MG capsule TAKE 1 CAPSULE(20 MG) BY MOUTH EVERY DAY 90 capsule 1     No current facility-administered medications on file prior to visit.        Allergies:  Benadryl allergy decongestant and Sulfamethoxazole-trimethoprim    Immunizations:  Immunization History   Administered Date(s) Administered    Influenza 10/03/2009, 10/04/2010, 11/10/2011    Influenza - High Dose - PF (65 years and older) 10/14/2015, 10/12/2016, 09/28/2017, 10/06/2018, 10/23/2019    Influenza - Trivalent (ADULT) 10/03/2009, 10/04/2010, 11/10/2011, 10/08/2013    Pneumococcal Conjugate - 13 Valent 10/12/2016    Pneumococcal Polysaccharide - 23 Valent 05/15/2018       Review of Systems:  Review of Systems   Musculoskeletal: Positive for arthralgias.   All other systems reviewed and are negative.      Objective:    Vitals:  Vitals:    06/19/20 1427   BP: (!) 180/62   Pulse: (!) 40   Temp: 98.1 °F  "(36.7 °C)   TempSrc: Oral   Weight: 63.3 kg (139 lb 7.1 oz)   Height: 5' 8" (1.727 m)   PainSc:   5   PainLoc: Hip       Physical Exam  Constitutional:       General: She is not in acute distress.  HENT:      Head: Normocephalic and atraumatic.   Eyes:      Pupils: Pupils are equal, round, and reactive to light.   Neck:      Musculoskeletal: Neck supple.   Cardiovascular:      Rate and Rhythm: Normal rate and regular rhythm.      Heart sounds: No murmur. No friction rub.   Pulmonary:      Effort: Pulmonary effort is normal.      Breath sounds: Normal breath sounds.   Abdominal:      General: Bowel sounds are normal. There is no distension.      Palpations: Abdomen is soft.      Tenderness: There is no abdominal tenderness.   Musculoskeletal:      Left hip: She exhibits decreased range of motion and decreased strength. She exhibits no tenderness, no bony tenderness, no swelling, no crepitus, no deformity and no laceration.   Skin:     General: Skin is warm and dry.      Findings: No rash.   Psychiatric:         Behavior: Behavior normal.         Data:  No previous labs, imaging, or notes available.        Grzegorz Gutierrez MD  Family Medicine      "

## 2020-06-23 ENCOUNTER — PATIENT OUTREACH (OUTPATIENT)
Dept: ADMINISTRATIVE | Facility: HOSPITAL | Age: 85
End: 2020-06-23

## 2020-06-23 NOTE — PROGRESS NOTES
Chart review completed 06/23/2020  Care Everywhere updates requested and reviewed.  Immunizations reconciled. Media reviewed.

## 2020-07-07 ENCOUNTER — OFFICE VISIT (OUTPATIENT)
Dept: FAMILY MEDICINE | Facility: CLINIC | Age: 85
End: 2020-07-07
Payer: MEDICARE

## 2020-07-07 VITALS
HEART RATE: 40 BPM | SYSTOLIC BLOOD PRESSURE: 138 MMHG | HEIGHT: 68 IN | TEMPERATURE: 98 F | BODY MASS INDEX: 20.63 KG/M2 | DIASTOLIC BLOOD PRESSURE: 60 MMHG | WEIGHT: 136.13 LBS

## 2020-07-07 DIAGNOSIS — M16.12 PRIMARY OSTEOARTHRITIS OF LEFT HIP: ICD-10-CM

## 2020-07-07 DIAGNOSIS — R00.1 BRADYCARDIA: ICD-10-CM

## 2020-07-07 DIAGNOSIS — I10 ESSENTIAL HYPERTENSION: Primary | ICD-10-CM

## 2020-07-07 DIAGNOSIS — R41.3 MEMORY LOSS: ICD-10-CM

## 2020-07-07 PROCEDURE — 99499 RISK ADDL DX/OHS AUDIT: ICD-10-PCS | Mod: HCNC,S$GLB,, | Performed by: FAMILY MEDICINE

## 2020-07-07 PROCEDURE — 1101F PT FALLS ASSESS-DOCD LE1/YR: CPT | Mod: HCNC,CPTII,S$GLB, | Performed by: FAMILY MEDICINE

## 2020-07-07 PROCEDURE — 99214 PR OFFICE/OUTPT VISIT, EST, LEVL IV, 30-39 MIN: ICD-10-PCS | Mod: HCNC,S$GLB,, | Performed by: FAMILY MEDICINE

## 2020-07-07 PROCEDURE — 99499 UNLISTED E&M SERVICE: CPT | Mod: HCNC,S$GLB,, | Performed by: FAMILY MEDICINE

## 2020-07-07 PROCEDURE — 3078F PR MOST RECENT DIASTOLIC BLOOD PRESSURE < 80 MM HG: ICD-10-PCS | Mod: HCNC,CPTII,S$GLB, | Performed by: FAMILY MEDICINE

## 2020-07-07 PROCEDURE — 1159F PR MEDICATION LIST DOCUMENTED IN MEDICAL RECORD: ICD-10-PCS | Mod: HCNC,S$GLB,, | Performed by: FAMILY MEDICINE

## 2020-07-07 PROCEDURE — 99999 PR PBB SHADOW E&M-EST. PATIENT-LVL IV: CPT | Mod: PBBFAC,HCNC,, | Performed by: FAMILY MEDICINE

## 2020-07-07 PROCEDURE — 1159F MED LIST DOCD IN RCRD: CPT | Mod: HCNC,S$GLB,, | Performed by: FAMILY MEDICINE

## 2020-07-07 PROCEDURE — 3075F PR MOST RECENT SYSTOLIC BLOOD PRESS GE 130-139MM HG: ICD-10-PCS | Mod: HCNC,CPTII,S$GLB, | Performed by: FAMILY MEDICINE

## 2020-07-07 PROCEDURE — 99999 PR PBB SHADOW E&M-EST. PATIENT-LVL IV: ICD-10-PCS | Mod: PBBFAC,HCNC,, | Performed by: FAMILY MEDICINE

## 2020-07-07 PROCEDURE — 3078F DIAST BP <80 MM HG: CPT | Mod: HCNC,CPTII,S$GLB, | Performed by: FAMILY MEDICINE

## 2020-07-07 PROCEDURE — 99214 OFFICE O/P EST MOD 30 MIN: CPT | Mod: HCNC,S$GLB,, | Performed by: FAMILY MEDICINE

## 2020-07-07 PROCEDURE — 1101F PR PT FALLS ASSESS DOC 0-1 FALLS W/OUT INJ PAST YR: ICD-10-PCS | Mod: HCNC,CPTII,S$GLB, | Performed by: FAMILY MEDICINE

## 2020-07-07 PROCEDURE — 3075F SYST BP GE 130 - 139MM HG: CPT | Mod: HCNC,CPTII,S$GLB, | Performed by: FAMILY MEDICINE

## 2020-07-07 NOTE — PROGRESS NOTES
THIS DOCUMENT WAS MADE IN PART WITH VOICE RECOGNITION SOFTWARE.  OCCASIONALLY THIS SOFTWARE WILL MISINTERPRET WORDS OR PHRASES.    Assessment and Plan:    1. Essential hypertension  Improved, continue amlodipine 5 mg, check blood pressure 2 or 3 times a week    2. Primary osteoarthritis of left hip  Initiating physical therapy tomorrow    3. Bradycardia  New problem, referral to cardiology.  Discussed signs and symptoms of symptomatic bradycardia.  Relatively new finding  - Ambulatory referral/consult to Cardiology; Future    4. Memory loss  24/30 on MMSE.  Daughter admits to patient with decreased memory.  Will recheck in 3 months      ______________________________________________________________________  Subjective:    Chief Complaint:  Chief Complaint   Patient presents with    hypertension follow up        HPI:  Ladonna is a 84 y.o. year old     Primary osteoarthritis of left hip  Rx Voltaren at previous visit, referred to physical therapy for treatment  Will start physical therapy tomorrow, otherwise no change    Essential hypertension  Asymptomatic  Elevated at last 2 visits, initiated amlodipine 5 mg at prior visit  Today is well controlled, did note that she had low heart rate today as well as on her blood pressure log.  Denies any symptoms of presyncope, chest discomfort, fatigue.  Over the last several years her heart rate has been appropriate, this is relatively new    During our visit the patient seem to be having trouble with memory.  MMSE revealed 24/30 score.  Daughter reports that she has for get an small things like names, dates.      Past Medical History:  Past Medical History:   Diagnosis Date    Anxiety     Breast cancer 1986    mastectomy right    GERD (gastroesophageal reflux disease)     Osteoporosis, unspecified        Past Surgical History:  Past Surgical History:   Procedure Laterality Date    AUGMENTATION OF BREAST  1989    right only     BREAST BIOPSY Left 1987    benign     BREAST SURGERY Right 1989    implant repair after rupture    BREAST SURGERY Right     implant repair after rupture    CATARACT EXTRACTION W/  INTRAOCULAR LENS IMPLANT      bilateral    COLONOSCOPY  2011    polyp    COLONOSCOPY N/A 2020    Procedure: COLONOSCOPY;  Surgeon: Papo Akers MD;  Location: [facility];  Service: Endoscopy;  Laterality: N/A;    dupuytrens      EYE SURGERY Bilateral     cataracts    MASTECTOMY Right 1986    TONSILLECTOMY         Family History:  Family History   Problem Relation Age of Onset    Cancer Maternal Aunt         colon    Breast cancer Maternal Aunt 50    Cancer Paternal Uncle         colon    Arthritis Mother     Parkinsonism Father     No Known Problems Sister     Henoch-Schonlein purpura Daughter     No Known Problems Son     No Known Problems Sister     Cancer Maternal Aunt         breast       Social History:  Social History     Socioeconomic History    Marital status:      Spouse name: Not on file    Number of children: Not on file    Years of education: Not on file    Highest education level: Not on file   Occupational History    Not on file   Social Needs    Financial resource strain: Not on file    Food insecurity     Worry: Not on file     Inability: Not on file    Transportation needs     Medical: Not on file     Non-medical: Not on file   Tobacco Use    Smoking status: Former Smoker     Packs/day: 0.10     Years: 24.00     Pack years: 2.40     Types: Cigarettes     Quit date: 1983     Years since quittin.0    Smokeless tobacco: Never Used    Tobacco comment: quit 30 years ago   Substance and Sexual Activity    Alcohol use: Yes     Alcohol/week: 1.0 standard drinks     Types: 1 Glasses of wine per week     Comment: per day    Drug use: No    Sexual activity: Not Currently   Lifestyle    Physical activity     Days per week: Not on file     Minutes per session: Not on file    Stress: Not on file   Relationships     Social connections     Talks on phone: Not on file     Gets together: Not on file     Attends Jew service: Not on file     Active member of club or organization: Not on file     Attends meetings of clubs or organizations: Not on file     Relationship status: Not on file   Other Topics Concern    Not on file   Social History Narrative    Not on file       Medications:  Current Outpatient Medications on File Prior to Visit   Medication Sig Dispense Refill    amitriptyline (ELAVIL) 10 MG tablet TAKE 1 TABLET(10 MG) BY MOUTH EVERY NIGHT AS NEEDED FOR INSOMNIA 90 tablet 1    amLODIPine (NORVASC) 5 MG tablet Take 1 tablet (5 mg total) by mouth every evening. 30 tablet 11    aspirin 325 MG tablet Take 325 mg by mouth as needed.       diclofenac sodium (VOLTAREN) 1 % Gel Apply 2 g topically 3 (three) times daily as needed. 100 g 3    fluticasone propionate (FLONASE) 50 mcg/actuation nasal spray 2 sprays (100 mcg total) by Each Nostril route once daily. 16 g 11    folic acid/multivit-min/lutein (CENTRUM SILVER ORAL) Take by mouth once daily.      loratadine (CLARITIN) 10 mg tablet Take 1 tablet (10 mg total) by mouth once daily. (Patient taking differently: Take 10 mg by mouth daily as needed. ) 30 tablet 11    melatonin 1 mg Tab Take 3 mg by mouth as needed. 1 Tablet Oral Every evening      omeprazole (PRILOSEC) 20 MG capsule TAKE 1 CAPSULE(20 MG) BY MOUTH EVERY DAY 90 capsule 1     No current facility-administered medications on file prior to visit.        Allergies:  Benadryl allergy decongestant and Sulfamethoxazole-trimethoprim    Immunizations:  Immunization History   Administered Date(s) Administered    Influenza 10/03/2009, 10/04/2010, 11/10/2011    Influenza - High Dose - PF (65 years and older) 10/14/2015, 10/12/2016, 09/28/2017, 10/06/2018, 10/23/2019    Influenza - Trivalent (ADULT) 10/03/2009, 10/04/2010, 11/10/2011, 10/08/2013    Pneumococcal Conjugate - 13 Valent 10/12/2016     "Pneumococcal Polysaccharide - 23 Valent 05/15/2018       Review of Systems:  Review of Systems   All other systems reviewed and are negative.      Objective:    Vitals:  Vitals:    07/07/20 1400   BP: 138/60   Pulse: (!) 40   Temp: 97.9 °F (36.6 °C)   TempSrc: Oral   Weight: 61.7 kg (136 lb 2.1 oz)   Height: 5' 8" (1.727 m)       Physical Exam  Constitutional:       General: She is not in acute distress.  HENT:      Head: Normocephalic and atraumatic.   Eyes:      Pupils: Pupils are equal, round, and reactive to light.   Neck:      Musculoskeletal: Neck supple.   Cardiovascular:      Rate and Rhythm: Normal rate and regular rhythm.      Heart sounds: No murmur. No friction rub.   Pulmonary:      Effort: Pulmonary effort is normal.      Breath sounds: Normal breath sounds.   Abdominal:      General: Bowel sounds are normal. There is no distension.      Palpations: Abdomen is soft.      Tenderness: There is no abdominal tenderness.   Skin:     General: Skin is warm and dry.      Findings: No rash.   Psychiatric:         Behavior: Behavior normal.         Data:  No previous labs, imaging, or notes available.        Grzegorz Gutierrez MD  Family Medicine      "

## 2020-07-15 ENCOUNTER — CLINICAL SUPPORT (OUTPATIENT)
Dept: REHABILITATION | Facility: HOSPITAL | Age: 85
End: 2020-07-15
Attending: FAMILY MEDICINE
Payer: MEDICARE

## 2020-07-15 DIAGNOSIS — R26.89 DECREASED FUNCTIONAL MOBILITY: ICD-10-CM

## 2020-07-15 DIAGNOSIS — M16.12 PRIMARY OSTEOARTHRITIS OF LEFT HIP: ICD-10-CM

## 2020-07-15 PROBLEM — M25.652 DECREASED RANGE OF LEFT HIP MOVEMENT: Status: RESOLVED | Noted: 2018-11-06 | Resolved: 2020-07-15

## 2020-07-15 PROBLEM — M25.552 LEFT HIP PAIN: Status: RESOLVED | Noted: 2018-11-06 | Resolved: 2020-07-15

## 2020-07-15 PROBLEM — M62.81 MUSCLE WEAKNESS OF LOWER EXTREMITY: Status: RESOLVED | Noted: 2018-11-06 | Resolved: 2020-07-15

## 2020-07-15 PROCEDURE — 97161 PT EVAL LOW COMPLEX 20 MIN: CPT | Mod: HCNC,PN

## 2020-07-15 PROCEDURE — 97110 THERAPEUTIC EXERCISES: CPT | Mod: HCNC,PN

## 2020-07-15 NOTE — PLAN OF CARE
[facility]  Physical Therapy Initial Evaluation    Name: Ladonna St  Clinic Number: [id number]    Therapy Diagnosis:   Encounter Diagnoses   Name Primary?    Primary osteoarthritis of left hip     Decreased functional mobility      Physician: Grzegorz Gutierrez MD    Physician Orders: PT Eval and Treat   Medical Diagnosis from Referral: Primary osteoarthritis of left hip  Evaluation Date: 7/15/2020  Authorization Period Expiration: 7/25/2020  Plan of Care Expiration: 9/11/2020  Visit # / Visits authorized: 1/ 1    Time In: 2:30  Time Out: 3:15  Total Billable Time: 45 minutes    Precautions: Standard    Subjective   Date of onset: increased L hip pain 1 month ago  History of current condition - Ladonna reports: pt was in [place] in 2008, she got up to go to the bathroom and tripped and fell on L hip.  She went to the hospital and was told nothing was fx.     Medical History:   Past Medical History:   Diagnosis Date    Anxiety     Breast cancer 1986    mastectomy right    GERD (gastroesophageal reflux disease)     Osteoporosis, unspecified        Surgical History:   Ladonna St  has a past surgical history that includes Colonoscopy (2011); dupuytrens; Tonsillectomy; Eye surgery (Bilateral); Cataract extraction w/  intraocular lens implant; Breast surgery (Right, 1989); Breast surgery (Right); Breast biopsy (Left, 1987); Mastectomy (Right, 1986); Augmentation of breast (1989); and Colonoscopy (N/A, 1/28/2020).    Medications:   Ladonna has a current medication list which includes the following prescription(s): amitriptyline, amlodipine, aspirin, diclofenac sodium, fluticasone propionate, folic acid/multivit-min/lutein, loratadine, melatonin, and omeprazole.    Allergies:   Review of patient's allergies indicates:   Allergen Reactions    Benadryl allergy decongestant      Other reaction(s): hyperactive    Sulfamethoxazole-trimethoprim      Other reaction(s): Rash         Imaging, none for L hip  Prior Therapy: PT in 2018 for L hip  Social History: pt lives alone in a 2 story house, but states she does not go upstairs (daughter lives 5 min away)  Occupation: retired  Prior Level of Function: able to perform all ADLs without pain  Current Level of Function: pain with prolonged walking, pain with kneel to stand with gardening, cooking    Pt sleeps undisturbed at night  Pt is not currently exercising.  She does own a TM    Pain:  Current 0/10, worst 2/10, best 0/10   Location: left hip   Pt denies numbness/tingling  Aggravating Factors: prolonged walking, gardening, prolonged standing  Easing Factors: sitting, rest, Voltran gel    Pts goals: be more active, decreased L hip pain      Objective     Observation: pt is pleasant and cooperative      Posture: kyphosis      Gait: pt ambulated into clinic without AD mod I for increased time.  She presents with decreased L stance time.    Lumbar AROM:  Flex 80%, tight HS  Ext 25%, no pain  R SB 90%, pulling on L  L SB 90%, no  R rot 25%, no  L rot 25%, no    Range of Motion: AROM (PROM):  Hip Left Right   Flexion 110 degrees 105  degrees   Abduction 30  degrees 30 degrees     PROM Right Left Comment   Hip ER, 90°  flex: 30 degrees 34 degrees     Hip IR, 90°  flex: 32 degrees 20 degrees     *pain    Knee ROM: WFL B    Strength:  Hip Left Right   Flexion 4/5 4+/5   Abduction 3/5 4/5   Adduction 4/5 5/5   Extension 3/5 4/5     Knee Left Right   Extension 5/5 5/5   Flexion 5/5 5/5     Ankle Left Right   Dorsiflexion 5/5 5/5       Special Tests:  Hip Left Right   ALDAIR Positive Negative   Scour Positive Negative     Palpation: TTP and tightness to L QL and lumbar paraspinals    Sensation: grossly intact to light touch    Function:   - Sit <--> Stand:able to stand from chair with UE support   - Bed Mobility: mod I for increased time    Flexibility:    Popliteal Angle: R = -50 degrees ; L = -30 degrees      CMS Impairment/Limitation/Restriction  for FOTO Hip Survey  Status Limitation G-Code CMS Severity Modifier  Intake 51% 49% Current Status CK - At least 40 percent but less than 60 percent  Predicted 61% 39% Goal Status+ CJ - At least 20 percent but less than 40 percent   PT Evaluation Completed? Yes  Discussed Plan of Care with patient: Yes      TREATMENT   Treatment Time In: 3:10  Treatment Time Out: 3:30  Total Treatment time separate from Evaluation: 20 minutes    Ladonna received therapeutic exercises to develop strength, ROM, flexibility and core stabilization for 15 minutes including:  glute sets 10x5 sec  Supine HS stretch 10x10 sec  TA set 10x5 sec  SL clams x10 ea (more difficult on L)  Sit to stand from chair + foam x5 (vc for sequencing)    May add: TA set + uniclam, TA set + march, SLR, bridges, SL hip abd    Ladonna received hot pack for 5 minutes to low back for muscle relaxation.      Home Exercises and Patient Education Provided    Education provided:   - importance of being active  -importance of strengthening muscles around hip for stabilization and decreased pain  Pt gave verbal understanding to all education provided    Written Home Exercises Provided: Patient instructed to cont prior HEP.  Exercises were reviewed and Ladonna was able to demonstrate them prior to the end of the session.  Ladonna demonstrated good  understanding of the education provided.     See EMR under Patient Instructions for exercises provided 7/15/2020.    Assessment   Ladonna is a 84 y.o. female referred to outpatient Physical Therapy with a medical diagnosis of Primary osteoarthritis of left hip. Pt presents with decreased lumbar ROM, decreased LE strength (L weaker than R), gait abnormality, difficulty with sit to stand, pain to L hip with prolonged standing/walking.    Pt prognosis is Good.   Pt will benefit from skilled outpatient Physical Therapy to address the deficits stated above and in the chart below, provide pt/family education, and to  maximize pt's level of independence.     Plan of care discussed with patient: Yes  Pt's spiritual, cultural and educational needs considered and patient is agreeable to the plan of care and goals as stated below:     Anticipated Barriers for therapy: dependent on transportation    Medical Necessity is demonstrated by the following  History  Co-morbidities and personal factors that may impact the plan of care Co-morbidities:   anxiety and osteoporosis    Personal Factors:   lifestyle     high   Examination  Body Structures and Functions, activity limitations and participation restrictions that may impact the plan of care Body Regions:   back  lower extremities    Body Systems:    ROM  strength  gait    Participation Restrictions:   gardening    Activity limitations:   Learning and applying knowledge  no deficits    General Tasks and Commands  no deficits    Communication  no deficits    Mobility  lifting and carrying objects  walking    Self care  no deficits    Domestic Life  shopping  cooking  doing house work (cleaning house, washing dishes, laundry)    Interactions/Relationships  no deficits    Life Areas  no deficits    Community and Social Life  community life  recreation and leisure         high   Clinical Presentation stable and uncomplicated low   Decision Making/ Complexity Score: low       GOALS:   Short Term Goals:  4 weeks  1. Pt will report decreased L hip pain to 2/10 in order to increase tolerance for standing to cook a meal.  2. Pt will L hamstring length by 10 deg for increased ease with ADLs.  3. Pt will increase R hamstring length by 10 deg for increased ease with ADLs.  4. Pt will increase R LE strength by 1/3 muscle grade in all deficient planes for increased ease with ADLs and work activities.  5. Pt will increase L LE strength by 1/3 muscle grade in all deficient planes for increased ease with ADLs and work activities.  6. Pt will be independent and consistent with issued HEP in order to show  carryover between therapy sessions.  7. Pt will be able to stand from chair without UE support for increased functional mobility.    Long Term Goals: 8 weeks  1. Pt will report decreased L hip pain to 1/10 in order to increase tolerance for ADLs.  2. Pt will increase L hamstring length by 20 deg in order to show improved functional mobility.  3. Pt will increase R LE strength by 1 muscle grade in all deficient planes  in order to increase tolerance to functional activities and ADLs.  4. Pt will increase L LE strength by 1 muscle grade in all deficient planes  in order to increase tolerance to functional activities and ADLs.  5. Pt will be independent with updated HEP to maintain gains following discharge with therapy.  6. Pt will perform sit to stand from chair x5 reps without UE support I for increased ease with ADLs  7. Pt will perform floor to stand transfer with UE support mod I for increased ease with gardening.      Plan   Plan of care Certification: 7/15/2020 to 9/11/2020.    Outpatient Physical Therapy 2 times weekly for 8 weeks to include the following interventions: Electrical Stimulation  , Gait Training, Manual Therapy, Moist Heat/ Ice, Neuromuscular Re-ed, Patient Education, Self Care, Therapeutic Activites and Therapeutic Exercise.     Ev Allen, PT

## 2020-07-21 ENCOUNTER — CLINICAL SUPPORT (OUTPATIENT)
Dept: REHABILITATION | Facility: HOSPITAL | Age: 85
End: 2020-07-21
Payer: MEDICARE

## 2020-07-21 DIAGNOSIS — R26.89 DECREASED FUNCTIONAL MOBILITY: ICD-10-CM

## 2020-07-21 PROCEDURE — 97110 THERAPEUTIC EXERCISES: CPT | Mod: HCNC,PN,CQ

## 2020-07-21 NOTE — PROGRESS NOTES
Physical Therapy Treatment Note     Name: Ladonna St  Clinic Number: [id number]    Therapy Diagnosis:   Encounter Diagnosis   Name Primary?    Decreased functional mobility      Physician: Grzegorz Gutierrez MD    Visit Date: 7/21/2020    Physician Orders: PT Eval and Treat   Medical Diagnosis from Referral: Primary osteoarthritis of left hip  Evaluation Date: 7/15/2020  Authorization Period Expiration: 1/21/21  Plan of Care Expiration: 9/11/2020  Visit # / Visits authorized: 1 / 12 (+1 from previous auth)     Time In: 2:05  Time Out: 2:50  Total Billable Time: 45 minutes     Precautions: Standard    Subjective     Pt reports: continued L hip pain off and on depending on what she is doing. Currently feeling okay with no pain into the hip as of now.  She was compliant with home exercise program.  Response to previous treatment: felt okay, no increased pain  Functional change: none noted yet    Pain: 0/10  Location: left hip    Objective     Ladonna received therapeutic exercises to develop strength, ROM, flexibility and core stabilization for 45 minutes including:  glute sets 15x5 sec  Supine HS stretch 10x10 sec  TA set 10x5 sec  TA set + march x 10 each, alternating  Bridges 2 x 10  SL clams x10 ea (more difficult on L)  Sit to stand from chair + foam x5 (vc for sequencing)     May add: TA set + uniclam with band, SLR, SL hip abd     Ladonna received hot pack for 5 minutes to low back for muscle relaxation.    Home Exercises Provided and Patient Education Provided     Education provided:   - emphasis on pain free ROM with stretches and exercises  - avoidance of activities that cause an increase of pain    Written Home Exercises Provided: yes.  Exercises were reviewed and Ladonna was able to demonstrate them prior to the end of the session.  Ladonna demonstrated good  understanding of the education provided.     See EMR under Patient Instructions for exercises provided 7/15/2020 (initial eval) and  7/21/2020.    Assessment     Ladonna tolerated treatment well overall this date and was able to complete additional exercises without exacerbation of symptoms into the L hip. Patient continues with with weakness of B hips and difficulty activating abdominal musculature. Patient will benefit from continued progression as tolerated for improved strength, ROM, and tolerance to daily activities.  Ladonna is progressing well towards her goals.   Pt prognosis is Good.     Pt will continue to benefit from skilled outpatient physical therapy to address the deficits listed in the problem list box on initial evaluation, provide pt/family education and to maximize pt's level of independence in the home and community environment.     Pt's spiritual, cultural and educational needs considered and pt agreeable to plan of care and goals.     Anticipated barriers to physical therapy: dependent on transportation    Goals:   Short Term Goals:  4 weeks  1. Pt will report decreased L hip pain to 2/10 in order to increase tolerance for standing to cook a meal.  2. Pt will L hamstring length by 10 deg for increased ease with ADLs.  3. Pt will increase R hamstring length by 10 deg for increased ease with ADLs.  4. Pt will increase R LE strength by 1/3 muscle grade in all deficient planes for increased ease with ADLs and work activities.  5. Pt will increase L LE strength by 1/3 muscle grade in all deficient planes for increased ease with ADLs and work activities.  6. Pt will be independent and consistent with issued HEP in order to show carryover between therapy sessions.  7. Pt will be able to stand from chair without UE support for increased functional mobility.     Long Term Goals: 8 weeks  1. Pt will report decreased L hip pain to 1/10 in order to increase tolerance for ADLs.  2. Pt will increase L hamstring length by 20 deg in order to show improved functional mobility.  3. Pt will increase R LE strength by 1 muscle grade in all  deficient planes  in order to increase tolerance to functional activities and ADLs.  4. Pt will increase L LE strength by 1 muscle grade in all deficient planes  in order to increase tolerance to functional activities and ADLs.  5. Pt will be independent with updated HEP to maintain gains following discharge with therapy.  6. Pt will perform sit to stand from chair x5 reps without UE support I for increased ease with ADLs  7. Pt will perform floor to stand transfer with UE support mod I for increased ease with gardening.    Plan     Progress toward current PT goals within set POC.   May add: TA set + uniclam with band, SLR, SL hip abd    Lois No, PTA

## 2020-07-23 ENCOUNTER — CLINICAL SUPPORT (OUTPATIENT)
Dept: REHABILITATION | Facility: HOSPITAL | Age: 85
End: 2020-07-23
Payer: MEDICARE

## 2020-07-23 DIAGNOSIS — R26.89 DECREASED FUNCTIONAL MOBILITY: ICD-10-CM

## 2020-07-23 PROCEDURE — 97110 THERAPEUTIC EXERCISES: CPT | Mod: HCNC,PN,CQ

## 2020-07-23 NOTE — PROGRESS NOTES
"  Physical Therapy Treatment Note     Name: Ladonna St  Clinic Number: [id number]    Therapy Diagnosis:   Encounter Diagnosis   Name Primary?    Decreased functional mobility      Physician: Grzegorz Gutierrez MD    Visit Date: 7/23/2020    Physician Orders: PT Eval and Treat   Medical Diagnosis from Referral: Primary osteoarthritis of left hip  Evaluation Date: 7/15/2020  Authorization Period Expiration: 1/21/21  Plan of Care Expiration: 9/11/2020  Visit # / Visits authorized: 2 / 12 (+1 from previous auth)     Time In: 1:20  Time Out: 2:02  Total Billable Time: 40 minutes     Precautions: Standard    Subjective     Pt reports: mild increase of pain into the L hip today. States she woke with that pain and it has been bothering her. Discomfort primarily with walking.   She was not compliant with home exercise program.  Response to previous treatment: felt okay, no increased pain  Functional change: none noted yet    Pain: 3/10  Location: left hip    Objective     Ladonna received therapeutic exercises to develop strength, ROM, flexibility and core stabilization for 40 minutes including:  glute sets 10x5 sec knees straight, 10 x 5" with knees bent  Supine HS stretch 10x10 sec  TA set 10x5 sec - difficulty with correct TA activation  TA set + march x 10 each, alternating - difficulty with TA set  Bridges 2 x 10  SL clams x10 ea (more difficult on L)  (NP-time) Sit to stand from chair + foam x5 (vc for sequencing)     May add: TA set + uniclam with band, SLR, SL hip abd     Ladonna received hot pack for 5 minutes to low back for muscle relaxation.    Home Exercises Provided and Patient Education Provided     Education provided:   - emphasis on pain free ROM with stretches and exercises  - avoidance of activities that cause an increase of pain    Written Home Exercises Provided: yes.  Exercises were reviewed and Ladonna was able to demonstrate them prior to the end of the session.  Ladonna demonstrated good " " understanding of the education provided.     See EMR under Patient Instructions for exercises provided 7/15/2020 (initial eval) and 7/21/2020.    Assessment     Ladonna presents with overall good tolerance to exercises however continues with difficulty properly activation abdominal musculature for TA sets, requiring increased time spent attaining proper muscle activation. Patient with tendency to "suck in" stomach versus activating transverse abdominus. Verbal cues to "push belly out" were successful in proper TA activation. Patient continues with with B hip and core weakness, and will benefit from continued progression as tolerated for improved strength, ROM, and tolerance to daily activities.  Ladonna is progressing well towards her goals.   Pt prognosis is Good.     Pt will continue to benefit from skilled outpatient physical therapy to address the deficits listed in the problem list box on initial evaluation, provide pt/family education and to maximize pt's level of independence in the home and community environment.     Pt's spiritual, cultural and educational needs considered and pt agreeable to plan of care and goals.     Anticipated barriers to physical therapy: dependent on transportation    Goals:   Short Term Goals:  4 weeks  1. Pt will report decreased L hip pain to 2/10 in order to increase tolerance for standing to cook a meal.  2. Pt will L hamstring length by 10 deg for increased ease with ADLs.  3. Pt will increase R hamstring length by 10 deg for increased ease with ADLs.  4. Pt will increase R LE strength by 1/3 muscle grade in all deficient planes for increased ease with ADLs and work activities.  5. Pt will increase L LE strength by 1/3 muscle grade in all deficient planes for increased ease with ADLs and work activities.  6. Pt will be independent and consistent with issued HEP in order to show carryover between therapy sessions.  7. Pt will be able to stand from chair without UE support " for increased functional mobility.     Long Term Goals: 8 weeks  1. Pt will report decreased L hip pain to 1/10 in order to increase tolerance for ADLs.  2. Pt will increase L hamstring length by 20 deg in order to show improved functional mobility.  3. Pt will increase R LE strength by 1 muscle grade in all deficient planes  in order to increase tolerance to functional activities and ADLs.  4. Pt will increase L LE strength by 1 muscle grade in all deficient planes  in order to increase tolerance to functional activities and ADLs.  5. Pt will be independent with updated HEP to maintain gains following discharge with therapy.  6. Pt will perform sit to stand from chair x5 reps without UE support I for increased ease with ADLs  7. Pt will perform floor to stand transfer with UE support mod I for increased ease with gardening.    Plan     Progress toward current PT goals within set POC.   May add: TA set + uniclam with band, SLR, SL hip abd    Lois No, PTA

## 2020-07-30 ENCOUNTER — CLINICAL SUPPORT (OUTPATIENT)
Dept: REHABILITATION | Facility: HOSPITAL | Age: 85
End: 2020-07-30
Payer: MEDICARE

## 2020-07-30 DIAGNOSIS — R26.89 DECREASED FUNCTIONAL MOBILITY: ICD-10-CM

## 2020-07-30 PROCEDURE — 97110 THERAPEUTIC EXERCISES: CPT | Mod: HCNC,PN

## 2020-07-30 NOTE — PROGRESS NOTES
"  Physical Therapy Treatment Note     Name: Ladonna St  Clinic Number: [id number]    Therapy Diagnosis:   Encounter Diagnosis   Name Primary?    Decreased functional mobility      Physician: Grzegorz Gutierrez MD    Visit Date: 7/30/2020    Physician Orders: PT Eval and Treat   Medical Diagnosis from Referral: Primary osteoarthritis of left hip  Evaluation Date: 7/15/2020  Authorization Period Expiration: 1/21/21  Plan of Care Expiration: 9/11/2020  Visit # / Visits authorized: 3 / 12 (+1 from previous auth)     Time In: 1:15  Time Out: 2:00  Total Billable Time: 45 minutes     Precautions: Standard    Subjective     Pt reports: pt states she has not had much L hip pain until last night.  She's not sure what she did yesterday that may have exacerbated it.  She was not compliant with home exercise program.  Response to previous treatment: felt okay, no increased pain  Functional change: none noted yet    Pain: 3/10  Location: left hip    Objective     Ladonna received therapeutic exercises to develop strength, ROM, flexibility and core stabilization for 45 minutes including:  glute sets 10x5 sec knees straight, 10 x 5" with knees bent  Supine HS stretch 6x10 sec  TA set 10x5 sec - (blow out bday candle)  TA set + march x 10 each, alternating  TA set + uniclam with YTB x10 ea  Bridges 2 x 10  SL clams x10 ea (more difficult on L)  SLR x10 ea  SL hip abd x10 ea (mc for form)  Hip abd walk in // bars x2 laps  Sit to stand from chair + foam x5 (vc to lean fwd)     May add: shuttle, TA set + sh ext     NOT performed: Ladonna received hot pack for 5 minutes to low back for muscle relaxation.    Home Exercises Provided and Patient Education Provided     Education provided:   - emphasis on pain free ROM with stretches and exercises  - avoidance of activities that cause an increase of pain    Written Home Exercises Provided: pt instructed to continue previous HEP.  Exercises were reviewed and Ladonna was able to " demonstrate them prior to the end of the session.  Ladonna demonstrated good  understanding of the education provided.     See EMR under Patient Instructions for exercises provided 7/15/2020 (initial eval) and 7/21/2020.    Assessment     Ladonna demonstrated improved form for TA sets today.  She required cues to maintain TA set with LE motion.  She was hesitant to lean forward to perform sit to stands, but improved with practice and encouragement.  Patient continues with  B hip and core weakness, and will benefit from continued progression as tolerated for improved strength, ROM, and tolerance to daily activities.  Ladonna is progressing well towards her goals.   Pt prognosis is Good.     Pt will continue to benefit from skilled outpatient physical therapy to address the deficits listed in the problem list box on initial evaluation, provide pt/family education and to maximize pt's level of independence in the home and community environment.     Pt's spiritual, cultural and educational needs considered and pt agreeable to plan of care and goals.     Anticipated barriers to physical therapy: dependent on transportation    Goals:   Short Term Goals:  4 weeks (progressing)  1. Pt will report decreased L hip pain to 2/10 in order to increase tolerance for standing to cook a meal.  2. Pt will L hamstring length by 10 deg for increased ease with ADLs.  3. Pt will increase R hamstring length by 10 deg for increased ease with ADLs.  4. Pt will increase R LE strength by 1/3 muscle grade in all deficient planes for increased ease with ADLs and work activities.  5. Pt will increase L LE strength by 1/3 muscle grade in all deficient planes for increased ease with ADLs and work activities.  6. Pt will be independent and consistent with issued HEP in order to show carryover between therapy sessions.  7. Pt will be able to stand from chair without UE support for increased functional mobility.     Long Term Goals: 8 weeks  (progressing)  1. Pt will report decreased L hip pain to 1/10 in order to increase tolerance for ADLs.  2. Pt will increase L hamstring length by 20 deg in order to show improved functional mobility.  3. Pt will increase R LE strength by 1 muscle grade in all deficient planes  in order to increase tolerance to functional activities and ADLs.  4. Pt will increase L LE strength by 1 muscle grade in all deficient planes  in order to increase tolerance to functional activities and ADLs.  5. Pt will be independent with updated HEP to maintain gains following discharge with therapy.  6. Pt will perform sit to stand from chair x5 reps without UE support I for increased ease with ADLs  7. Pt will perform floor to stand transfer with UE support mod I for increased ease with gardening.    Plan     Progress toward current PT goals within set POC.   May add: JOANA gillespie set + sh ext    Ev Allen, PT

## 2020-08-04 ENCOUNTER — CLINICAL SUPPORT (OUTPATIENT)
Dept: REHABILITATION | Facility: HOSPITAL | Age: 85
End: 2020-08-04
Payer: MEDICARE

## 2020-08-04 DIAGNOSIS — R26.89 DECREASED FUNCTIONAL MOBILITY: ICD-10-CM

## 2020-08-04 PROCEDURE — 97110 THERAPEUTIC EXERCISES: CPT | Mod: HCNC,PN,CQ

## 2020-08-04 NOTE — PROGRESS NOTES
"  Physical Therapy Treatment Note     Name: Ladonna St  Clinic Number: [id number]    Therapy Diagnosis:   Encounter Diagnosis   Name Primary?    Decreased functional mobility      Physician: Grzegorz Gutierrez MD    Visit Date: 8/4/2020    Physician Orders: PT Eval and Treat   Medical Diagnosis from Referral: Primary osteoarthritis of left hip  Evaluation Date: 7/15/2020  Authorization Period Expiration: 1/21/21  Plan of Care Expiration: 9/11/2020  Visit # / Visits authorized: 4 / 12 (+1 from previous auth)     Time In: 1:15  Time Out: 2:04  Total Billable Time: 45 minutes     Precautions: Standard    Subjective     Pt reports: her L hip is giving her a little more trouble today, feels it gives her trouble walking today.  She was not compliant with home exercise program.  Response to previous treatment: felt okay, no increased pain  Functional change: none noted yet    Pain: 3/10  Location: left hip    Objective     Ladonna received therapeutic exercises to develop strength, ROM, flexibility and core stabilization for 45 minutes including:  glute sets 10x5 sec knees straight, 10 x 5" with knees bent  Supine HS stretch 6x10 sec  TA set 10x5 sec - (blow out bday candle)  TA set + march x 10 each, alternating  TA set + uniclam with YTB x15 ea  Bridges 2 x 10  SL clams x10 ea (more difficult on L)  SLR x10 ea  SL hip abd x10 ea (mc for form)  Hip abd walk in // bars x3 laps  Sit to stand from chair + foam x5 (vc to lean fwd)     May add: shuttle, TA set + sh ext     NOT performed: Ladonna received hot pack for 5 minutes to low back for muscle relaxation.    Home Exercises Provided and Patient Education Provided     Education provided:   - emphasis on pain free ROM with stretches and exercises  - avoidance of activities that cause an increase of pain    Written Home Exercises Provided: pt instructed to continue previous HEP.  Exercises were reviewed and Ladonna was able to demonstrate them prior to the end of " the session.  Ladonna demonstrated good  understanding of the education provided.     See EMR under Patient Instructions for exercises provided 7/15/2020 (initial eval) and 7/21/2020.    Assessment     Ladonna continues with core, hip, and LE weakness. Required increased cues for proper review and technique of exercises this date as patient did not remember several mat exercises. Consistent cues required to maintain TA set with LE motion. Good tolerance to sit-stands however required cues to lower straight down to prevent posterior center of gravity. Patient will benefit from continued progression as tolerated for improved strength, ROM, and tolerance to daily activities.  Ladonna is progressing well towards her goals.   Pt prognosis is Good.     Pt will continue to benefit from skilled outpatient physical therapy to address the deficits listed in the problem list box on initial evaluation, provide pt/family education and to maximize pt's level of independence in the home and community environment.     Pt's spiritual, cultural and educational needs considered and pt agreeable to plan of care and goals.     Anticipated barriers to physical therapy: dependent on transportation    Goals:   Short Term Goals:  4 weeks (progressing)  1. Pt will report decreased L hip pain to 2/10 in order to increase tolerance for standing to cook a meal.  2. Pt will L hamstring length by 10 deg for increased ease with ADLs.  3. Pt will increase R hamstring length by 10 deg for increased ease with ADLs.  4. Pt will increase R LE strength by 1/3 muscle grade in all deficient planes for increased ease with ADLs and work activities.  5. Pt will increase L LE strength by 1/3 muscle grade in all deficient planes for increased ease with ADLs and work activities.  6. Pt will be independent and consistent with issued HEP in order to show carryover between therapy sessions.  7. Pt will be able to stand from chair without UE support for  increased functional mobility.     Long Term Goals: 8 weeks (progressing)  1. Pt will report decreased L hip pain to 1/10 in order to increase tolerance for ADLs.  2. Pt will increase L hamstring length by 20 deg in order to show improved functional mobility.  3. Pt will increase R LE strength by 1 muscle grade in all deficient planes  in order to increase tolerance to functional activities and ADLs.  4. Pt will increase L LE strength by 1 muscle grade in all deficient planes  in order to increase tolerance to functional activities and ADLs.  5. Pt will be independent with updated HEP to maintain gains following discharge with therapy.  6. Pt will perform sit to stand from chair x5 reps without UE support I for increased ease with ADLs  7. Pt will perform floor to stand transfer with UE support mod I for increased ease with gardening.    Plan     Progress toward current PT goals within set POC.   May add: JOANA gillespie set + sh ext    Lois No, PTA

## 2020-08-11 ENCOUNTER — CLINICAL SUPPORT (OUTPATIENT)
Dept: REHABILITATION | Facility: HOSPITAL | Age: 85
End: 2020-08-11
Payer: MEDICARE

## 2020-08-11 DIAGNOSIS — R26.89 DECREASED FUNCTIONAL MOBILITY: ICD-10-CM

## 2020-08-11 PROCEDURE — 97110 THERAPEUTIC EXERCISES: CPT | Mod: HCNC,PN,CQ

## 2020-08-11 NOTE — PROGRESS NOTES
"  Physical Therapy Treatment Note     Name: Ladonna St  Clinic Number: [id number]    Therapy Diagnosis:   No diagnosis found.  Physician: Grzegorz Gutierrez MD    Visit Date: 8/11/2020    Physician Orders: PT Eval and Treat   Medical Diagnosis from Referral: Primary osteoarthritis of left hip  Evaluation Date: 7/15/2020  Authorization Period Expiration: 1/21/21  Plan of Care Expiration: 9/11/2020  Visit # / Visits authorized: 5 / 12 (+1 from previous auth)     Time In: 1:10 PM (pt arrived late)  Time Out: 1:48 PM  Total Billable Time: 40 minutes     Precautions: Standard    Subjective     Pt reports: the L hip is doing better overall. States she thinks she has been moving more.   She was not compliant with home exercise program.  Response to previous treatment: felt okay, no increased pain  Functional change: none noted yet    Pain: 0/10  Location: left hip    Objective     Ladonna received therapeutic exercises to develop strength, ROM, flexibility and core stabilization for 40 minutes including:  glute sets 10x5 sec knees straight, 10 x 5" with knees bent  Supine HS stretch 6x10 sec  TA set 10x5 sec - (blow out bday candle)  TA set + march x 10 each, alternating  TA set + uniclam with YTB x15 ea  Bridges 2 x 10  SL clams x10 ea (more difficult on L) (NP for time purposes)  SLR x10 ea  SL hip abd x10 ea (mc for form) - L hip more weak  Hip abd walk in // bars x3 laps  Sit to stand from chair + foam x5 (vc to lean fwd) (NP for time purposes)     May add: shuttle, TA set + sh ext     NOT performed: Ladonna received hot pack for 5 minutes to low back for muscle relaxation.    Home Exercises Provided and Patient Education Provided     Education provided:   - emphasis on pain free ROM with stretches and exercises  - avoidance of activities that cause an increase of pain    Written Home Exercises Provided: pt instructed to continue previous HEP.  Exercises were reviewed and Ladonna was able to demonstrate them " prior to the end of the session.  Ladonna demonstrated good  understanding of the education provided.     See EMR under Patient Instructions for exercises provided 7/15/2020 (initial eval) and 7/21/2020.    Assessment     Ladonna presents with overall good tolerance to completed exercises, required cues for reminders of correct techniques with all supine exercises. Patient core, hip, and LE weakness continues but is very slowly improving. Able to perform sidesteps without UE assistance. She will further benefit from progression of core and LE strengthening including further progression into standing activities as able.  Ladonna is progressing well towards her goals.   Pt prognosis is Good.     Pt will continue to benefit from skilled outpatient physical therapy to address the deficits listed in the problem list box on initial evaluation, provide pt/family education and to maximize pt's level of independence in the home and community environment.     Pt's spiritual, cultural and educational needs considered and pt agreeable to plan of care and goals.     Anticipated barriers to physical therapy: dependent on transportation    Goals:   Short Term Goals:  4 weeks (progressing)  1. Pt will report decreased L hip pain to 2/10 in order to increase tolerance for standing to cook a meal.  2. Pt will L hamstring length by 10 deg for increased ease with ADLs.  3. Pt will increase R hamstring length by 10 deg for increased ease with ADLs.  4. Pt will increase R LE strength by 1/3 muscle grade in all deficient planes for increased ease with ADLs and work activities.  5. Pt will increase L LE strength by 1/3 muscle grade in all deficient planes for increased ease with ADLs and work activities.  6. Pt will be independent and consistent with issued HEP in order to show carryover between therapy sessions.  7. Pt will be able to stand from chair without UE support for increased functional mobility.     Long Term Goals: 8 weeks  (progressing)  1. Pt will report decreased L hip pain to 1/10 in order to increase tolerance for ADLs.  2. Pt will increase L hamstring length by 20 deg in order to show improved functional mobility.  3. Pt will increase R LE strength by 1 muscle grade in all deficient planes  in order to increase tolerance to functional activities and ADLs.  4. Pt will increase L LE strength by 1 muscle grade in all deficient planes  in order to increase tolerance to functional activities and ADLs.  5. Pt will be independent with updated HEP to maintain gains following discharge with therapy.  6. Pt will perform sit to stand from chair x5 reps without UE support I for increased ease with ADLs  7. Pt will perform floor to stand transfer with UE support mod I for increased ease with gardening.    Plan     Progress toward current PT goals within set POC.   May add: JOANA gillespie set + sh ext    Lois No, PTA

## 2020-08-13 ENCOUNTER — CLINICAL SUPPORT (OUTPATIENT)
Dept: REHABILITATION | Facility: HOSPITAL | Age: 85
End: 2020-08-13
Payer: MEDICARE

## 2020-08-13 DIAGNOSIS — R26.89 DECREASED FUNCTIONAL MOBILITY: ICD-10-CM

## 2020-08-13 PROCEDURE — 97110 THERAPEUTIC EXERCISES: CPT | Mod: HCNC,PN

## 2020-08-13 NOTE — PROGRESS NOTES
"  Physical Therapy Treatment Note     Name: Ladonna St  Clinic Number: [id number]    Therapy Diagnosis:   Encounter Diagnosis   Name Primary?    Decreased functional mobility      Physician: Grzegorz Gutierrez MD    Visit Date: 8/13/2020    Physician Orders: PT Eval and Treat   Medical Diagnosis from Referral: Primary osteoarthritis of left hip  Evaluation Date: 7/15/2020  Authorization Period Expiration: 1/21/21  Plan of Care Expiration: 9/11/2020  Visit # / Visits authorized: 6 / 12 (+1 from previous auth)     Time In: 1:15 PM   Time Out: 1:55 PM  Total Billable Time: 40 minutes     Precautions: Standard    Subjective     Pt reports: the L hip is doing better overall. She states she has not had much pain to L hip recently and states she has been trying to walk more.  She was somewhat compliant with home exercise program. (every other day)  Response to previous treatment: felt okay, no increased pain  Functional change: none noted yet    Pain: 0/10  Location: left hip    Objective     Ladonna received therapeutic exercises to develop strength, ROM, flexibility and core stabilization for 40 minutes including:  glute sets 10x5 sec knees straight, 10 x 5" with knees bent  Supine HS stretch 6x10 sec  TA set 10x5 sec - (blow out bday candle)  TA set + march x 10 each, alternating  TA set + uniclam with YTB x15 ea  Bridges 2 x 10  SL clams x10 ea (more difficult on L) (NP for time purposes)  SLR x10 ea  SL hip abd x10 ea (mc for form) - L hip more weak  Hip abd walk in // bars x3 laps  Sit to stand from chair + foam x5 (vc to lean fwd) (NP for time purposes)  TA set + sh ext YTB 1x10, 1x5 (stopped due to fatigue and pt starting to compensate with UT)    May add: shuttle      Home Exercises Provided and Patient Education Provided     Education provided:   - emphasis on pain free ROM with stretches and exercises  - avoidance of activities that cause an increase of pain    Written Home Exercises Provided: pt " instructed to continue previous HEP.  Exercises were reviewed and Ladonna was able to demonstrate them prior to the end of the session.  Ladonna demonstrated good  understanding of the education provided.     See EMR under Patient Instructions for exercises provided 7/15/2020 (initial eval) and 7/21/2020.    Assessment     Ladonna continues to require cues for proper TA set, but was able to perform with practice.  She demonstrated improved form for sit to stand today.  She will continue to benefit from progressive hip and core strengthening as tolerated.  Ladonna is progressing well towards her goals.   Pt prognosis is Good.     Pt will continue to benefit from skilled outpatient physical therapy to address the deficits listed in the problem list box on initial evaluation, provide pt/family education and to maximize pt's level of independence in the home and community environment.     Pt's spiritual, cultural and educational needs considered and pt agreeable to plan of care and goals.     Anticipated barriers to physical therapy: dependent on transportation    Goals:   Short Term Goals:  4 weeks (progressing)  1. Pt will report decreased L hip pain to 2/10 in order to increase tolerance for standing to cook a meal.  2. Pt will L hamstring length by 10 deg for increased ease with ADLs.  3. Pt will increase R hamstring length by 10 deg for increased ease with ADLs.  4. Pt will increase R LE strength by 1/3 muscle grade in all deficient planes for increased ease with ADLs and work activities.  5. Pt will increase L LE strength by 1/3 muscle grade in all deficient planes for increased ease with ADLs and work activities.  6. Pt will be independent and consistent with issued HEP in order to show carryover between therapy sessions.  7. Pt will be able to stand from chair without UE support for increased functional mobility.     Long Term Goals: 8 weeks (progressing)  1. Pt will report decreased L hip pain to 1/10  in order to increase tolerance for ADLs.  2. Pt will increase L hamstring length by 20 deg in order to show improved functional mobility.  3. Pt will increase R LE strength by 1 muscle grade in all deficient planes  in order to increase tolerance to functional activities and ADLs.  4. Pt will increase L LE strength by 1 muscle grade in all deficient planes  in order to increase tolerance to functional activities and ADLs.  5. Pt will be independent with updated HEP to maintain gains following discharge with therapy.  6. Pt will perform sit to stand from chair x5 reps without UE support I for increased ease with ADLs  7. Pt will perform floor to stand transfer with UE support mod I for increased ease with gardening.    Plan     Progress toward current PT goals within set POC.  Continue to progress hip and core strength as tolerated.  May add: jose miguel Allen, PT

## 2020-08-19 PROBLEM — E78.2 MIXED HYPERLIPIDEMIA: Status: ACTIVE | Noted: 2020-08-19

## 2020-08-19 PROBLEM — I10 ESSENTIAL HYPERTENSION: Status: ACTIVE | Noted: 2020-08-19

## 2020-08-19 NOTE — PROGRESS NOTES
Subjective:    Patient ID:  Ladonna St is a 84 y.o. female who presents for evaluation of Consult (Ref by Dr. Gutierrez ) and Bradycardia      Problem List Items Addressed This Visit        Cardiac/Vascular    Essential hypertension    Mixed hyperlipidemia      Other Visit Diagnoses     Bradycardia    -  Primary          HPI    Referred by Dr. Gutierrez    The patient states that she feels OK in general  Gets very fatigued. No clear syncope.    No chest pain.  No shortness of breath.  Reasonably active as above    No dizziness, lightheadedness, presyncope, or syncope.     Personal history of heart attack or stroke - None that she is aware of  Family history of heart disease - Not aware of any    Past Medical History:   Diagnosis Date    Anxiety     Breast cancer 1986    mastectomy right    GERD (gastroesophageal reflux disease)     Osteoporosis, unspecified        Past Surgical History:   Procedure Laterality Date    AUGMENTATION OF BREAST  1989    right only     BREAST BIOPSY Left 1987    benign    BREAST SURGERY Right 1989    implant repair after rupture    BREAST SURGERY Right     implant repair after rupture    CATARACT EXTRACTION W/  INTRAOCULAR LENS IMPLANT      bilateral    COLONOSCOPY  2011    polyp    COLONOSCOPY N/A 1/28/2020    Procedure: COLONOSCOPY;  Surgeon: Papo Akers MD;  [facility];  Service: Endoscopy;  Laterality: N/A;    dupuytrens      EYE SURGERY Bilateral     cataracts    MASTECTOMY Right 1986    TONSILLECTOMY         Family History   Problem Relation Age of Onset    Cancer Maternal Aunt         colon    Breast cancer Maternal Aunt 50    Cancer Paternal Uncle         colon    Arthritis Mother     Parkinsonism Father     No Known Problems Sister     Henoch-Schonlein purpura Daughter     No Known Problems Son     No Known Problems Sister     Cancer Maternal Aunt         breast       Social History     Socioeconomic History    Marital status:       Spouse name: Not on file    Number of children: Not on file    Years of education: Not on file    Highest education level: Not on file   Occupational History    Not on file   Social Needs    Financial resource strain: Not on file    Food insecurity     Worry: Not on file     Inability: Not on file    Transportation needs     Medical: Not on file     Non-medical: Not on file   Tobacco Use    Smoking status: Former Smoker     Packs/day: 0.10     Years: 24.00     Pack years: 2.40     Types: Cigarettes     Quit date: 1983     Years since quittin.2    Smokeless tobacco: Never Used    Tobacco comment: quit 30 years ago   Substance and Sexual Activity    Alcohol use: Yes     Alcohol/week: 1.0 standard drinks     Types: 1 Glasses of wine per week     Comment: per day    Drug use: No    Sexual activity: Not Currently   Lifestyle    Physical activity     Days per week: Not on file     Minutes per session: Not on file    Stress: Not on file   Relationships    Social connections     Talks on phone: Not on file     Gets together: Not on file     Attends [employer] service: Not on file     Active member of club or organization: Not on file     Attends meetings of clubs or organizations: Not on file     Relationship status: Not on file   Other Topics Concern    Not on file   Social History Narrative    Not on file       Review of patient's allergies indicates:   Allergen Reactions    Benadryl allergy decongestant      Other reaction(s): hyperactive    Sulfamethoxazole-trimethoprim      Other reaction(s): Rash       Review of Systems   Constitution: Negative for decreased appetite, fever and malaise/fatigue.   Eyes: Negative for blurred vision.   Cardiovascular: Negative for chest pain, dyspnea on exertion, irregular heartbeat and leg swelling.   Respiratory: Negative for cough, hemoptysis, shortness of breath and wheezing.    Endocrine: Negative for cold intolerance and heat intolerance.  "  Hematologic/Lymphatic: Negative for bleeding problem.   Musculoskeletal: Negative for muscle weakness and myalgias.   Gastrointestinal: Negative for abdominal pain, constipation and diarrhea.   Genitourinary: Negative for bladder incontinence.   Neurological: Negative for dizziness and weakness.   Psychiatric/Behavioral: Negative for depression.        Objective:     Vitals:    08/20/20 1456   BP: (!) 154/67   BP Location: Right arm   Patient Position: Sitting   BP Method: Medium (Automatic)   Pulse: (!) 46   Weight: 61.2 kg (134 lb 14.7 oz)   Height: 5' 7.5" (1.715 m)        Physical Exam   Constitutional: She is oriented to person, place, and time. She appears well-developed and well-nourished. No distress.   HENT:   Head: Normocephalic and atraumatic.   Neck: Neck supple. No JVD present.   Cardiovascular: Exam reveals no gallop and no friction rub.   No murmur heard.  Bradycardic   Pulmonary/Chest: Effort normal and breath sounds normal. No respiratory distress. She has no wheezes. She has no rales.   Abdominal: Soft. Bowel sounds are normal. There is no abdominal tenderness. There is no rebound and no guarding.   Musculoskeletal:         General: No tenderness or edema.   Neurological: She is alert and oriented to person, place, and time.   Skin: Skin is warm and dry.   Psychiatric: Her behavior is normal.   Nursing note and vitals reviewed.          Current Outpatient Medications on File Prior to Visit   Medication Sig    amitriptyline (ELAVIL) 10 MG tablet TAKE 1 TABLET(10 MG) BY MOUTH EVERY NIGHT AS NEEDED FOR INSOMNIA    amLODIPine (NORVASC) 5 MG tablet Take 1 tablet (5 mg total) by mouth every evening.    aspirin 325 MG tablet Take 325 mg by mouth as needed.     diclofenac sodium (VOLTAREN) 1 % Gel Apply 2 g topically 3 (three) times daily as needed.    fluticasone propionate (FLONASE) 50 mcg/actuation nasal spray 2 sprays (100 mcg total) by Each Nostril route once daily.    loratadine (CLARITIN) 10 " mg tablet Take 1 tablet (10 mg total) by mouth once daily. (Patient taking differently: Take 10 mg by mouth daily as needed. )    melatonin 1 mg Tab Take 3 mg by mouth as needed. 1 Tablet Oral Every evening    omeprazole (PRILOSEC) 20 MG capsule TAKE 1 CAPSULE(20 MG) BY MOUTH EVERY DAY    folic acid/multivit-min/lutein (CENTRUM SILVER ORAL) Take by mouth once daily.     No current facility-administered medications on file prior to visit.        Lipid Panel:   Lab Results   Component Value Date    CHOL 214 (H) 10/24/2019    HDL 77 (H) 10/24/2019    LDLCALC 115.4 10/24/2019    TRIG 108 10/24/2019    CHOLHDL 36.0 10/24/2019         The ASCVD Risk score (Ben DC Jr., et al., 2013) failed to calculate for the following reasons:    The 2013 ASCVD risk score is only valid for ages 40 to 79    All pertinent labs, imaging, and EKGs reviewed.  Patient's most recent EKG tracing was personally interpreted by this provider.    Assessment:       1. Bradycardia    2. Essential hypertension    3. 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 Prieto for symptomatic bradycardia  Strict ED precautions given    Continue other cardiac medications  Mediterranean Diet/Cardiovascular Exercise Program    Patient queried and all questions were answered.    F/u in 3 months to reassess      Signed:    Mahesh Chaidez MD  8/20/2020 5:53 PM

## 2020-08-20 ENCOUNTER — CLINICAL SUPPORT (OUTPATIENT)
Dept: REHABILITATION | Facility: HOSPITAL | Age: 85
End: 2020-08-20
Payer: MEDICARE

## 2020-08-20 ENCOUNTER — OFFICE VISIT (OUTPATIENT)
Dept: CARDIOLOGY | Facility: CLINIC | Age: 85
End: 2020-08-20
Payer: MEDICARE

## 2020-08-20 VITALS
HEIGHT: 68 IN | HEART RATE: 46 BPM | BODY MASS INDEX: 20.45 KG/M2 | WEIGHT: 134.94 LBS | SYSTOLIC BLOOD PRESSURE: 154 MMHG | DIASTOLIC BLOOD PRESSURE: 67 MMHG

## 2020-08-20 DIAGNOSIS — R00.1 BRADYCARDIA: Primary | ICD-10-CM

## 2020-08-20 DIAGNOSIS — I10 ESSENTIAL HYPERTENSION: Primary | ICD-10-CM

## 2020-08-20 DIAGNOSIS — R00.1 BRADYCARDIA: ICD-10-CM

## 2020-08-20 DIAGNOSIS — I10 ESSENTIAL HYPERTENSION: ICD-10-CM

## 2020-08-20 DIAGNOSIS — E78.2 MIXED HYPERLIPIDEMIA: ICD-10-CM

## 2020-08-20 DIAGNOSIS — R26.89 DECREASED FUNCTIONAL MOBILITY: ICD-10-CM

## 2020-08-20 PROCEDURE — 1126F PR PAIN SEVERITY QUANTIFIED, NO PAIN PRESENT: ICD-10-PCS | Mod: HCNC,S$GLB,, | Performed by: INTERNAL MEDICINE

## 2020-08-20 PROCEDURE — 3078F DIAST BP <80 MM HG: CPT | Mod: HCNC,CPTII,S$GLB, | Performed by: INTERNAL MEDICINE

## 2020-08-20 PROCEDURE — 93000 EKG 12-LEAD: ICD-10-PCS | Mod: HCNC,S$GLB,, | Performed by: INTERNAL MEDICINE

## 2020-08-20 PROCEDURE — 97110 THERAPEUTIC EXERCISES: CPT | Mod: HCNC,PN

## 2020-08-20 PROCEDURE — 99999 PR PBB SHADOW E&M-EST. PATIENT-LVL III: CPT | Mod: PBBFAC,HCNC,, | Performed by: INTERNAL MEDICINE

## 2020-08-20 PROCEDURE — 3077F SYST BP >= 140 MM HG: CPT | Mod: HCNC,CPTII,S$GLB, | Performed by: INTERNAL MEDICINE

## 2020-08-20 PROCEDURE — 1159F MED LIST DOCD IN RCRD: CPT | Mod: HCNC,S$GLB,, | Performed by: INTERNAL MEDICINE

## 2020-08-20 PROCEDURE — 99205 PR OFFICE/OUTPT VISIT, NEW, LEVL V, 60-74 MIN: ICD-10-PCS | Mod: HCNC,S$GLB,, | Performed by: INTERNAL MEDICINE

## 2020-08-20 PROCEDURE — 99999 PR PBB SHADOW E&M-EST. PATIENT-LVL III: ICD-10-PCS | Mod: PBBFAC,HCNC,, | Performed by: INTERNAL MEDICINE

## 2020-08-20 PROCEDURE — 99205 OFFICE O/P NEW HI 60 MIN: CPT | Mod: HCNC,S$GLB,, | Performed by: INTERNAL MEDICINE

## 2020-08-20 PROCEDURE — 1159F PR MEDICATION LIST DOCUMENTED IN MEDICAL RECORD: ICD-10-PCS | Mod: HCNC,S$GLB,, | Performed by: INTERNAL MEDICINE

## 2020-08-20 PROCEDURE — 1101F PT FALLS ASSESS-DOCD LE1/YR: CPT | Mod: HCNC,CPTII,S$GLB, | Performed by: INTERNAL MEDICINE

## 2020-08-20 PROCEDURE — 1126F AMNT PAIN NOTED NONE PRSNT: CPT | Mod: HCNC,S$GLB,, | Performed by: INTERNAL MEDICINE

## 2020-08-20 PROCEDURE — 93000 ELECTROCARDIOGRAM COMPLETE: CPT | Mod: HCNC,S$GLB,, | Performed by: INTERNAL MEDICINE

## 2020-08-20 PROCEDURE — 3078F PR MOST RECENT DIASTOLIC BLOOD PRESSURE < 80 MM HG: ICD-10-PCS | Mod: HCNC,CPTII,S$GLB, | Performed by: INTERNAL MEDICINE

## 2020-08-20 PROCEDURE — 1101F PR PT FALLS ASSESS DOC 0-1 FALLS W/OUT INJ PAST YR: ICD-10-PCS | Mod: HCNC,CPTII,S$GLB, | Performed by: INTERNAL MEDICINE

## 2020-08-20 PROCEDURE — 3077F PR MOST RECENT SYSTOLIC BLOOD PRESSURE >= 140 MM HG: ICD-10-PCS | Mod: HCNC,CPTII,S$GLB, | Performed by: INTERNAL MEDICINE

## 2020-08-20 NOTE — PROGRESS NOTES
"  Physical Therapy Treatment Note     Name: Ladonna St  Clinic Number: [id number]    Therapy Diagnosis:   Encounter Diagnosis   Name Primary?    Decreased functional mobility      Physician: Grzegorz Gutierrez MD    Visit Date: 8/20/2020    Physician Orders: PT Eval and Treat   Medical Diagnosis from Referral: Primary osteoarthritis of left hip  Evaluation Date: 7/15/2020  Authorization Period Expiration: 1/21/21  Plan of Care Expiration: 9/11/2020  Visit # / Visits authorized: 7 / 12 (+1 from previous auth)     Time In: 9:35  Time Out: 10:15  Total Billable Time: 40 minutes     Precautions: Standard    Subjective     Pt reports: the L hip is doing better overall. Pt states she has not had pain to L hip since last visit.  She was somewhat compliant with home exercise program. (every other day)  Response to previous treatment: felt okay, no increased pain  Functional change: none noted yet    Pain: 0/10  Location: left hip    Objective     Ladonna received therapeutic exercises to develop strength, ROM, flexibility and core stabilization for 40 minutes including:  glute sets 10x5 sec knees straight, 10 x 5" with knees bent  Supine HS stretch 6x10 sec  TA set 10x5 sec - (blow out bday candle)  TA set + march x 10 each, alternating  TA set + uniclam with YTB x15 ea  Bridges 2 x 10  SL clams x10 ea (more difficult on L) (NP for time purposes)  SLR x10 ea  SL hip abd x10 ea (mc for form) - L hip more weak  Hip abd walk in // bars x3 laps  Sit to stand from 18in block (vc to lean fwd) x6, fatigue noted  TA set + sh ext YTB 2x10   Standing hip abd (// bars for UE support) x10 ea    May add: shuttle      Home Exercises Provided and Patient Education Provided     Education provided:   - emphasis on pain free ROM with stretches and exercises  - avoidance of activities that cause an increase of pain    Written Home Exercises Provided: pt instructed to continue previous HEP.  Exercises were reviewed and Ladonna was " able to demonstrate them prior to the end of the session.  Ladonna demonstrated good  understanding of the education provided.     See EMR under Patient Instructions for exercises provided 7/15/2020 (initial eval) and 7/21/2020.    Assessment     Ladonna demonstrated improved TA set today.  She requires cues for proper form for hip abd.  L hip remains weaker than R.  Pt reported 0/10 pain to L hip post tx.  Ladonna is progressing well towards her goals.   Pt prognosis is Good.     Pt will continue to benefit from skilled outpatient physical therapy to address the deficits listed in the problem list box on initial evaluation, provide pt/family education and to maximize pt's level of independence in the home and community environment.     Pt's spiritual, cultural and educational needs considered and pt agreeable to plan of care and goals.     Anticipated barriers to physical therapy: dependent on transportation    Goals:   Short Term Goals:  4 weeks (progressing)  1. Pt will report decreased L hip pain to 2/10 in order to increase tolerance for standing to cook a meal.  2. Pt will L hamstring length by 10 deg for increased ease with ADLs.  3. Pt will increase R hamstring length by 10 deg for increased ease with ADLs.  4. Pt will increase R LE strength by 1/3 muscle grade in all deficient planes for increased ease with ADLs and work activities.  5. Pt will increase L LE strength by 1/3 muscle grade in all deficient planes for increased ease with ADLs and work activities.  6. Pt will be independent and consistent with issued HEP in order to show carryover between therapy sessions.  7. Pt will be able to stand from chair without UE support for increased functional mobility.     Long Term Goals: 8 weeks (progressing)  1. Pt will report decreased L hip pain to 1/10 in order to increase tolerance for ADLs.  2. Pt will increase L hamstring length by 20 deg in order to show improved functional mobility.  3. Pt will  increase R LE strength by 1 muscle grade in all deficient planes  in order to increase tolerance to functional activities and ADLs.  4. Pt will increase L LE strength by 1 muscle grade in all deficient planes  in order to increase tolerance to functional activities and ADLs.  5. Pt will be independent with updated HEP to maintain gains following discharge with therapy.  6. Pt will perform sit to stand from chair x5 reps without UE support I for increased ease with ADLs  7. Pt will perform floor to stand transfer with UE support mod I for increased ease with gardening.    Plan     Progress toward current PT goals within set POC.  Continue to progress hip and core strength as tolerated.  May add: jose miguel Allen, PT

## 2020-08-20 NOTE — LETTER
August 20, 2020      Grzegorz Gutierrez MD  [address]  [facility] [address]           [facility]  [address] [place] [address]  Phone: [phone]          Patient: Ladonna St   MR Number: [id number]   YOB: 1935   Date of Visit: 8/20/2020       Dear Dr. Grzegorz Gutierrez:    Thank you for referring Ladonna St to me for evaluation. Attached you will find relevant portions of my assessment and plan of care.    If you have questions, please do not hesitate to call me. I look forward to following Ladonna St along with you.    Sincerely,    Mahesh Chaidez MD    Enclosure  CC:  No Recipients    If you would like to receive this communication electronically, please contact [email or web address] or [phone] to request more information on [employer] access.    For providers and/or their staff who would like to refer a patient to [facility], please contact us through our one-stop-shop provider referral line, [facility], at [phone].    If you feel you have received this communication in error or would no longer like to receive these types of communications, please e-mail [email or web address]

## 2020-08-21 ENCOUNTER — CLINICAL SUPPORT (OUTPATIENT)
Dept: CARDIOLOGY | Facility: CLINIC | Age: 85
End: 2020-08-21
Attending: INTERNAL MEDICINE
Payer: MEDICARE

## 2020-08-21 VITALS — BODY MASS INDEX: 20.31 KG/M2 | WEIGHT: 134 LBS | HEIGHT: 68 IN

## 2020-08-21 DIAGNOSIS — R00.1 BRADYCARDIA: ICD-10-CM

## 2020-08-21 PROCEDURE — 93306 TTE W/DOPPLER COMPLETE: CPT | Mod: HCNC,S$GLB,, | Performed by: INTERNAL MEDICINE

## 2020-08-21 PROCEDURE — 93306 ECHO (CUPID ONLY): ICD-10-PCS | Mod: HCNC,S$GLB,, | Performed by: INTERNAL MEDICINE

## 2020-08-21 PROCEDURE — 99999 PR PBB SHADOW E&M-EST. PATIENT-LVL I: ICD-10-PCS | Mod: PBBFAC,HCNC,,

## 2020-08-21 PROCEDURE — 99999 PR PBB SHADOW E&M-EST. PATIENT-LVL I: CPT | Mod: PBBFAC,HCNC,,

## 2020-08-22 DIAGNOSIS — G47.00 INSOMNIA, UNSPECIFIED TYPE: Primary | ICD-10-CM

## 2020-08-24 LAB
ASCENDING AORTA: 3.23 CM
AV INDEX (PROSTH): 1.02
AV MEAN GRADIENT: 10 MMHG
AV PEAK GRADIENT: 17 MMHG
AV VALVE AREA: 3.12 CM2
AV VELOCITY RATIO: 0.98
BSA FOR ECHO PROCEDURE: 1.7 M2
CV ECHO LV RWT: 0.43 CM
DOP CALC AO PEAK VEL: 2.04 M/S
DOP CALC AO VTI: 47.73 CM
DOP CALC LVOT AREA: 3.1 CM2
DOP CALC LVOT DIAMETER: 1.98 CM
DOP CALC LVOT PEAK VEL: 1.99 M/S
DOP CALC LVOT STROKE VOLUME: 149.11 CM3
DOP CALCLVOT PEAK VEL VTI: 48.45 CM
E WAVE DECELERATION TIME: 128.46 MSEC
E/A RATIO: 0.7
E/E' RATIO: 11.86 M/S
ECHO LV POSTERIOR WALL: 0.99 CM (ref 0.6–1.1)
FRACTIONAL SHORTENING: 49 % (ref 28–44)
INTERVENTRICULAR SEPTUM: 0.77 CM (ref 0.6–1.1)
LA MAJOR: 5.44 CM
LA MINOR: 5.38 CM
LA WIDTH: 2.99 CM
LEFT ATRIUM SIZE: 3.34 CM
LEFT ATRIUM VOLUME INDEX: 26.8 ML/M2
LEFT ATRIUM VOLUME: 45.92 CM3
LEFT INTERNAL DIMENSION IN SYSTOLE: 2.31 CM (ref 2.1–4)
LEFT VENTRICLE DIASTOLIC VOLUME INDEX: 55.7 ML/M2
LEFT VENTRICLE DIASTOLIC VOLUME: 95.51 ML
LEFT VENTRICLE MASS INDEX: 77 G/M2
LEFT VENTRICLE SYSTOLIC VOLUME INDEX: 10.7 ML/M2
LEFT VENTRICLE SYSTOLIC VOLUME: 18.38 ML
LEFT VENTRICULAR INTERNAL DIMENSION IN DIASTOLE: 4.56 CM (ref 3.5–6)
LEFT VENTRICULAR MASS: 131.74 G
LV LATERAL E/E' RATIO: 9.22 M/S
LV SEPTAL E/E' RATIO: 16.6 M/S
MV PEAK A VEL: 1.19 M/S
MV PEAK E VEL: 0.83 M/S
MV STENOSIS PRESSURE HALF TIME: 37.25 MS
MV VALVE AREA P 1/2 METHOD: 5.91 CM2
PISA MRMAX VEL: 0.05 M/S
PISA TR MAX VEL: 2.45 M/S
RA MAJOR: 4.53 CM
RA PRESSURE: 3 MMHG
RA WIDTH: 2.99 CM
RIGHT VENTRICULAR END-DIASTOLIC DIMENSION: 3.3 CM
SINUS: 4.13 CM
STJ: 3.16 CM
TDI LATERAL: 0.09 M/S
TDI SEPTAL: 0.05 M/S
TDI: 0.07 M/S
TR MAX PG: 24 MMHG
TRICUSPID ANNULAR PLANE SYSTOLIC EXCURSION: 2.39 CM
TV REST PULMONARY ARTERY PRESSURE: 27 MMHG

## 2020-08-24 RX ORDER — AMITRIPTYLINE HYDROCHLORIDE 10 MG/1
TABLET, FILM COATED ORAL
Qty: 90 TABLET | Refills: 1 | Status: SHIPPED | OUTPATIENT
Start: 2020-08-24 | End: 2021-05-06

## 2020-08-27 ENCOUNTER — TELEPHONE (OUTPATIENT)
Dept: CARDIOLOGY | Facility: CLINIC | Age: 85
End: 2020-08-27

## 2020-08-27 DIAGNOSIS — Z03.818 ENCNTR FOR OBS FOR SUSP EXPSR TO OTH BIOLG AGENTS RULED OUT: Primary | ICD-10-CM

## 2020-08-27 DIAGNOSIS — I44.2 AV BLOCK, COMPLETE, POST OP COMPLICATION OF AV NODAL ABLATION: ICD-10-CM

## 2020-08-27 DIAGNOSIS — R00.1 BRADYCARDIA: Primary | ICD-10-CM

## 2020-08-27 DIAGNOSIS — I97.190 AV BLOCK, COMPLETE, POST OP COMPLICATION OF AV NODAL ABLATION: ICD-10-CM

## 2020-08-27 RX ORDER — SODIUM CHLORIDE 9 MG/ML
INJECTION, SOLUTION INTRAVENOUS ONCE
Status: CANCELLED | OUTPATIENT
Start: 2020-08-27 | End: 2020-08-27

## 2020-08-27 RX ORDER — SODIUM CHLORIDE 0.9 % (FLUSH) 0.9 %
10 SYRINGE (ML) INJECTION
Status: DISCONTINUED | OUTPATIENT
Start: 2020-08-27 | End: 2020-09-02 | Stop reason: CLARIF

## 2020-08-27 NOTE — TELEPHONE ENCOUNTER
SPOKE W/ BISI, PT DTR, PPM DUAL CHAMBER SCHEDULED WITH DR. BONILLA 9/9/20  -  EXPLAINED PROCEDURE AND PRE-OP, DTR VERBALIZED UNDERSTANDING

## 2020-08-27 NOTE — TELEPHONE ENCOUNTER
----- Message from Jamey Ray LPN sent at 8/25/2020  3:08 PM CDT -----  Contact: Nicole prieto    ----- Message -----  From: Annette Gomes  Sent: 8/25/2020   1:54 PM CDT  To: [facility] Staff    Type: Needs Medical Advice  Who Called:  Nicole prieto  Best Call Back Number: [phone]  Additional Information: [facility] call Nicole regarding a pacemaker for her mother

## 2020-09-03 ENCOUNTER — CLINICAL SUPPORT (OUTPATIENT)
Dept: REHABILITATION | Facility: HOSPITAL | Age: 85
End: 2020-09-03
Payer: MEDICARE

## 2020-09-03 DIAGNOSIS — R26.89 DECREASED FUNCTIONAL MOBILITY: ICD-10-CM

## 2020-09-03 PROCEDURE — 97110 THERAPEUTIC EXERCISES: CPT | Mod: HCNC,PN,CQ

## 2020-09-03 NOTE — PROGRESS NOTES
"  Physical Therapy Treatment Note     Name: Ladonna St  Clinic Number: [id number]    Therapy Diagnosis:   Encounter Diagnosis   Name Primary?    Decreased functional mobility      Physician: Grzegorz Gutierrez MD    Visit Date: 9/3/2020    Physician Orders: PT Eval and Treat   Medical Diagnosis from Referral: Primary osteoarthritis of left hip  Evaluation Date: 7/15/2020  Authorization Period Expiration: 1/21/21  Plan of Care Expiration: 9/11/2020  Visit # / Visits authorized: 8 / 12 (+1 from previous auth)     Time In: 11:07  Time Out: 11:51  Total Billable Time: 42 minutes     Precautions: Standard    Subjective     Pt reports: the L hip is hurting a little more today. When asked with walking and turning when the hip hurts, patient states "I haven't stopped to analyze it that much, I just notice that it hurts". Reports the more she moves walking into the clinic the better the hip feels. Having a pacemaker put in next week.  She was somewhat compliant with home exercise program. (every other day)  Response to previous treatment: felt okay, no increased pain  Functional change: none noted yet    Pain: 3/10 initially walking into clinic, 0/10 post treatment  Location: left hip    Objective     Ladonna received therapeutic exercises to develop strength, ROM, flexibility and core stabilization for 42 minutes including:  glute sets 10x5 sec knees straight, 10 x 5" with knees bent  Supine HS stretch 6x10 sec  TA set 10x5 sec - (blow out bday candle)  TA set + march x 10 each, alternating  TA set + uniclam with YTB x15 ea  Bridges with YTB for hip abd nasreen 2 x 10  SL clams x10 ea (more difficult on L) (NP for time purposes)  SLR x10 ea  SL hip abd x10 ea (mc for form) - L hip more weak  Hip abd walk in // bars x2 laps instead of 3 for time purposes  Standing hip abd with slight ext (// bars for UE support) x10 ea  Sit to stand from 18in block (vc to lean fwd) x6, fatigue noted  TA set + sh ext YTB 2x10 (NP fo time " purposes)    May add: shuttle      Home Exercises Provided and Patient Education Provided     Education provided:   - emphasis on pain free ROM with stretches and exercises  - avoidance of activities that cause an increase of pain    Written Home Exercises Provided: pt instructed to continue previous HEP.  Exercises were reviewed and Ladonna was able to demonstrate them prior to the end of the session.  Ladonna demonstrated good  understanding of the education provided.     See EMR under Patient Instructions for exercises provided 7/15/2020 (initial eval) and 7/21/2020.    Assessment     Ladonna presents with overall good tolerance to treatment. Required mild increase of cues for TA set but demonstrated improved TA set after cues. Cues for proper mechanics and weight shifting forward with sit to stands. L hip remains weaker than R.  Pt reported 0/10 pain to L hip post tx.  Ladonna is progressing well towards her goals.   Pt prognosis is Good.     Pt will continue to benefit from skilled outpatient physical therapy to address the deficits listed in the problem list box on initial evaluation, provide pt/family education and to maximize pt's level of independence in the home and community environment.     Pt's spiritual, cultural and educational needs considered and pt agreeable to plan of care and goals.     Anticipated barriers to physical therapy: dependent on transportation    Goals:   Short Term Goals:  4 weeks (progressing)  1. Pt will report decreased L hip pain to 2/10 in order to increase tolerance for standing to cook a meal.  2. Pt will L hamstring length by 10 deg for increased ease with ADLs.  3. Pt will increase R hamstring length by 10 deg for increased ease with ADLs.  4. Pt will increase R LE strength by 1/3 muscle grade in all deficient planes for increased ease with ADLs and work activities.  5. Pt will increase L LE strength by 1/3 muscle grade in all deficient planes for increased ease with  ADLs and work activities.  6. Pt will be independent and consistent with issued HEP in order to show carryover between therapy sessions.  7. Pt will be able to stand from chair without UE support for increased functional mobility.     Long Term Goals: 8 weeks (progressing)  1. Pt will report decreased L hip pain to 1/10 in order to increase tolerance for ADLs.  2. Pt will increase L hamstring length by 20 deg in order to show improved functional mobility.  3. Pt will increase R LE strength by 1 muscle grade in all deficient planes  in order to increase tolerance to functional activities and ADLs.  4. Pt will increase L LE strength by 1 muscle grade in all deficient planes  in order to increase tolerance to functional activities and ADLs.  5. Pt will be independent with updated HEP to maintain gains following discharge with therapy.  6. Pt will perform sit to stand from chair x5 reps without UE support I for increased ease with ADLs  7. Pt will perform floor to stand transfer with UE support mod I for increased ease with gardening.    Plan     Progress toward current PT goals within set POC.  Continue to progress hip and core strength as tolerated.  May add: jose miguel No, PTA

## 2020-09-05 ENCOUNTER — LAB VISIT (OUTPATIENT)
Dept: FAMILY MEDICINE | Facility: CLINIC | Age: 85
End: 2020-09-05
Payer: MEDICARE

## 2020-09-05 DIAGNOSIS — Z03.818 ENCNTR FOR OBS FOR SUSP EXPSR TO OTH BIOLG AGENTS RULED OUT: ICD-10-CM

## 2020-09-05 LAB — SARS-COV-2 RNA RESP QL NAA+PROBE: NOT DETECTED

## 2020-09-05 PROCEDURE — U0003 INFECTIOUS AGENT DETECTION BY NUCLEIC ACID (DNA OR RNA); SEVERE ACUTE RESPIRATORY SYNDROME CORONAVIRUS 2 (SARS-COV-2) (CORONAVIRUS DISEASE [COVID-19]), AMPLIFIED PROBE TECHNIQUE, MAKING USE OF HIGH THROUGHPUT TECHNOLOGIES AS DESCRIBED BY CMS-2020-01-R: HCPCS | Mod: HCNC

## 2020-09-09 DIAGNOSIS — Z95.0 CARDIAC PACEMAKER IN SITU: Primary | ICD-10-CM

## 2020-09-09 DIAGNOSIS — R00.1 BRADYCARDIA: ICD-10-CM

## 2020-09-10 ENCOUNTER — TELEPHONE (OUTPATIENT)
Dept: CARDIOLOGY | Facility: CLINIC | Age: 85
End: 2020-09-10

## 2020-09-10 ENCOUNTER — DOCUMENTATION ONLY (OUTPATIENT)
Dept: CARDIOLOGY | Facility: CLINIC | Age: 85
End: 2020-09-10

## 2020-09-10 NOTE — TELEPHONE ENCOUNTER
----- Message from Cristel Hall sent at 9/10/2020  9:33 AM CDT -----  Type: Needs Medical Advice  Who Called:  Nicole/ daughter  Best Call Back Number: [phone]  Additional Information: please give call back to schedule 11 day f/u appt. D/c from [facility] on 09/09/2020

## 2020-09-21 ENCOUNTER — CLINICAL SUPPORT (OUTPATIENT)
Dept: CARDIOLOGY | Facility: CLINIC | Age: 85
End: 2020-09-21
Attending: INTERNAL MEDICINE
Payer: MEDICARE

## 2020-09-21 DIAGNOSIS — Z95.0 CARDIAC PACEMAKER IN SITU: ICD-10-CM

## 2020-09-21 DIAGNOSIS — R00.1 BRADYCARDIA: ICD-10-CM

## 2020-09-29 ENCOUNTER — PATIENT MESSAGE (OUTPATIENT)
Dept: OTHER | Facility: OTHER | Age: 85
End: 2020-09-29

## 2020-10-05 ENCOUNTER — PATIENT OUTREACH (OUTPATIENT)
Dept: ADMINISTRATIVE | Facility: OTHER | Age: 85
End: 2020-10-05

## 2020-10-05 PROBLEM — Z95.0 S/P PLACEMENT OF CARDIAC PACEMAKER: Status: ACTIVE | Noted: 2020-10-05

## 2020-10-05 NOTE — PROGRESS NOTES
LINKS immunization registry updated  Care Everywhere updated  Health Maintenance updated  Chart reviewed for overdue Proactive [facility] Encounters (CARLITOS) health maintenance testing (CRS, Breast Ca, Diabetic Eye Exam)   Orders entered:N/A

## 2020-10-05 NOTE — PROGRESS NOTES
"Subjective:    Patient ID:  Ladonna St is a 84 y.o. female who presents for follow-up of Post-op Evaluation (BIOPPM implant - [facility] 09/09/20)      Problem List Items Addressed This Visit        Cardiac/Vascular    Bradycardia - Primary    Essential hypertension    Mixed hyperlipidemia    S/P placement of cardiac pacemaker    Overview     Biotronik dual-chamber 09/09/2020               HPI    Patient was last seen on 08/20/2020 at which time she was scheduled for pacemaker implantation with Dr. Prieto.  Echocardiogram was ordered at that time that showed preserved ejection fraction without acute abnormalities.    On assessment today, the patient states that she is feeling much better since PPM implanted.    No chest pain.  No shortness of breath.  Still taking care of her ADLs, gardens, as active as she wants to be  Fatigue improved.       Review of Systems   Constitution: Negative for decreased appetite, fever and malaise/fatigue.   Eyes: Negative for blurred vision.   Cardiovascular: Negative for chest pain, dyspnea on exertion, irregular heartbeat and leg swelling.   Respiratory: Negative for cough, hemoptysis, shortness of breath and wheezing.    Endocrine: Negative for cold intolerance and heat intolerance.   Hematologic/Lymphatic: Negative for bleeding problem.   Musculoskeletal: Negative for muscle weakness and myalgias.   Gastrointestinal: Negative for abdominal pain, constipation and diarrhea.   Genitourinary: Negative for bladder incontinence.   Neurological: Negative for dizziness and weakness.   Psychiatric/Behavioral: Negative for depression.        Objective:     Vitals:    10/06/20 1021   BP: (!) 146/80   BP Location: Left arm   Patient Position: Sitting   BP Method: Medium (Manual)   Pulse: 64   Weight: 60.9 kg (134 lb 4.2 oz)   Height: 5' 8" (1.727 m)        Physical Exam   Constitutional: She is oriented to person, place, and time. She appears well-developed and well-nourished. No distress. "   HENT:   Head: Normocephalic and atraumatic.   Neck: Neck supple. No JVD present.   Cardiovascular: Normal rate, regular rhythm and normal heart sounds. Exam reveals no gallop and no friction rub.   No murmur heard.  Pulmonary/Chest: Effort normal and breath sounds normal. No respiratory distress. She has no wheezes. She has no rales.   Abdominal: Soft. Bowel sounds are normal. There is no abdominal tenderness. There is no rebound and no guarding.   Musculoskeletal:         General: No tenderness or edema.   Neurological: She is alert and oriented to person, place, and time.   Skin: Skin is warm and dry.   Psychiatric: Her behavior is normal.   Nursing note and vitals reviewed.          Current Outpatient Medications on File Prior to Visit   Medication Sig    amitriptyline (ELAVIL) 10 MG tablet TAKE 1 TABLET(10 MG) BY MOUTH EVERY NIGHT AS NEEDED FOR INSOMNIA (Patient taking differently: Take 10 mg by mouth every evening. )    amLODIPine (NORVASC) 5 MG tablet Take 1 tablet (5 mg total) by mouth every evening.    aspirin 325 MG tablet Take 325 mg by mouth as needed.     diclofenac sodium (VOLTAREN) 1 % Gel Apply 2 g topically 3 (three) times daily as needed.    fluticasone propionate (FLONASE) 50 mcg/actuation nasal spray 2 sprays (100 mcg total) by Each Nostril route once daily. (Patient taking differently: 2 sprays by Each Nostril route daily as needed. )    folic acid/multivit-min/lutein (CENTRUM SILVER ORAL) Take 1 capsule by mouth once daily.     loratadine (CLARITIN) 10 mg tablet Take 1 tablet (10 mg total) by mouth once daily. (Patient taking differently: Take 10 mg by mouth daily as needed. )    melatonin 1 mg Tab Take 3 mg by mouth as needed. 1 Tablet Oral Every evening    omeprazole (PRILOSEC) 20 MG capsule TAKE 1 CAPSULE(20 MG) BY MOUTH EVERY DAY (Patient taking differently: Take 20 mg by mouth once daily. )     Current Facility-Administered Medications on File Prior to Visit   Medication     mupirocin 2 % ointment       Lipid Panel:   Lab Results   Component Value Date    CHOL 214 (H) 10/24/2019    HDL 77 (H) 10/24/2019    LDLCALC 115.4 10/24/2019    TRIG 108 10/24/2019    CHOLHDL 36.0 10/24/2019       The ASCVD Risk score (Ben OVALLE Jr., et al., 2013) failed to calculate for the following reasons:    The 2013 ASCVD risk score is only valid for ages 40 to 79    All pertinent labs, imaging, and EKGs reviewed.  Patient's most recent EKG tracing was personally interpreted by this provider.    Assessment:       1. Bradycardia    2. Essential hypertension    3. Mixed hyperlipidemia    4. S/P placement of cardiac pacemaker         Plan:     Symptoms better  BP/Pulse OK today    Monitor in device clinic   Continue amlodipine 5 mg PO Daily   Echo prior to next visit  Home BP log     Continue other cardiac medications  Mediterranean Diet/Cardiovascular Exercise Program    Patient queried and all questions were answered.    F/u in 4 months with echo prior       Signed:    Mahesh Chaidez MD  10/6/2020 6:58 PM

## 2020-10-06 ENCOUNTER — OFFICE VISIT (OUTPATIENT)
Dept: CARDIOLOGY | Facility: CLINIC | Age: 85
End: 2020-10-06
Payer: MEDICARE

## 2020-10-06 VITALS
HEART RATE: 64 BPM | HEIGHT: 68 IN | SYSTOLIC BLOOD PRESSURE: 146 MMHG | WEIGHT: 134.25 LBS | BODY MASS INDEX: 20.34 KG/M2 | DIASTOLIC BLOOD PRESSURE: 80 MMHG

## 2020-10-06 DIAGNOSIS — R00.1 BRADYCARDIA: Primary | ICD-10-CM

## 2020-10-06 DIAGNOSIS — Z95.0 S/P PLACEMENT OF CARDIAC PACEMAKER: ICD-10-CM

## 2020-10-06 DIAGNOSIS — I10 ESSENTIAL HYPERTENSION: ICD-10-CM

## 2020-10-06 DIAGNOSIS — E78.2 MIXED HYPERLIPIDEMIA: ICD-10-CM

## 2020-10-06 PROCEDURE — 1126F AMNT PAIN NOTED NONE PRSNT: CPT | Mod: HCNC,S$GLB,, | Performed by: INTERNAL MEDICINE

## 2020-10-06 PROCEDURE — 3079F PR MOST RECENT DIASTOLIC BLOOD PRESSURE 80-89 MM HG: ICD-10-PCS | Mod: HCNC,CPTII,S$GLB, | Performed by: INTERNAL MEDICINE

## 2020-10-06 PROCEDURE — 1101F PR PT FALLS ASSESS DOC 0-1 FALLS W/OUT INJ PAST YR: ICD-10-PCS | Mod: HCNC,CPTII,S$GLB, | Performed by: INTERNAL MEDICINE

## 2020-10-06 PROCEDURE — 1126F PR PAIN SEVERITY QUANTIFIED, NO PAIN PRESENT: ICD-10-PCS | Mod: HCNC,S$GLB,, | Performed by: INTERNAL MEDICINE

## 2020-10-06 PROCEDURE — 1101F PT FALLS ASSESS-DOCD LE1/YR: CPT | Mod: HCNC,CPTII,S$GLB, | Performed by: INTERNAL MEDICINE

## 2020-10-06 PROCEDURE — 99999 PR PBB SHADOW E&M-EST. PATIENT-LVL III: CPT | Mod: PBBFAC,HCNC,, | Performed by: INTERNAL MEDICINE

## 2020-10-06 PROCEDURE — 1159F PR MEDICATION LIST DOCUMENTED IN MEDICAL RECORD: ICD-10-PCS | Mod: HCNC,S$GLB,, | Performed by: INTERNAL MEDICINE

## 2020-10-06 PROCEDURE — 3079F DIAST BP 80-89 MM HG: CPT | Mod: HCNC,CPTII,S$GLB, | Performed by: INTERNAL MEDICINE

## 2020-10-06 PROCEDURE — 99024 PR POST-OP FOLLOW-UP VISIT: ICD-10-PCS | Mod: HCNC,S$GLB,, | Performed by: INTERNAL MEDICINE

## 2020-10-06 PROCEDURE — 99999 PR PBB SHADOW E&M-EST. PATIENT-LVL III: ICD-10-PCS | Mod: PBBFAC,HCNC,, | Performed by: INTERNAL MEDICINE

## 2020-10-06 PROCEDURE — 3077F SYST BP >= 140 MM HG: CPT | Mod: HCNC,CPTII,S$GLB, | Performed by: INTERNAL MEDICINE

## 2020-10-06 PROCEDURE — 1159F MED LIST DOCD IN RCRD: CPT | Mod: HCNC,S$GLB,, | Performed by: INTERNAL MEDICINE

## 2020-10-06 PROCEDURE — 99024 POSTOP FOLLOW-UP VISIT: CPT | Mod: HCNC,S$GLB,, | Performed by: INTERNAL MEDICINE

## 2020-10-06 PROCEDURE — 3077F PR MOST RECENT SYSTOLIC BLOOD PRESSURE >= 140 MM HG: ICD-10-PCS | Mod: HCNC,CPTII,S$GLB, | Performed by: INTERNAL MEDICINE

## 2020-10-07 ENCOUNTER — OFFICE VISIT (OUTPATIENT)
Dept: FAMILY MEDICINE | Facility: CLINIC | Age: 85
End: 2020-10-07
Payer: MEDICARE

## 2020-10-07 VITALS
HEIGHT: 67 IN | DIASTOLIC BLOOD PRESSURE: 64 MMHG | TEMPERATURE: 98 F | HEART RATE: 66 BPM | BODY MASS INDEX: 20.95 KG/M2 | WEIGHT: 133.5 LBS | SYSTOLIC BLOOD PRESSURE: 120 MMHG

## 2020-10-07 DIAGNOSIS — Z23 IMMUNIZATION DUE: ICD-10-CM

## 2020-10-07 DIAGNOSIS — I10 ESSENTIAL HYPERTENSION: ICD-10-CM

## 2020-10-07 DIAGNOSIS — Z12.31 ENCOUNTER FOR SCREENING MAMMOGRAM FOR MALIGNANT NEOPLASM OF BREAST: ICD-10-CM

## 2020-10-07 DIAGNOSIS — R41.3 MEMORY LOSS: ICD-10-CM

## 2020-10-07 DIAGNOSIS — Z12.39 ENCOUNTER FOR SCREENING FOR MALIGNANT NEOPLASM OF BREAST, UNSPECIFIED SCREENING MODALITY: Primary | ICD-10-CM

## 2020-10-07 DIAGNOSIS — M70.62 TROCHANTERIC BURSITIS OF LEFT HIP: ICD-10-CM

## 2020-10-07 DIAGNOSIS — R00.1 BRADYCARDIA: ICD-10-CM

## 2020-10-07 PROCEDURE — 1159F PR MEDICATION LIST DOCUMENTED IN MEDICAL RECORD: ICD-10-PCS | Mod: HCNC,S$GLB,, | Performed by: FAMILY MEDICINE

## 2020-10-07 PROCEDURE — 99214 OFFICE O/P EST MOD 30 MIN: CPT | Mod: HCNC,25,S$GLB, | Performed by: FAMILY MEDICINE

## 2020-10-07 PROCEDURE — 3078F DIAST BP <80 MM HG: CPT | Mod: HCNC,CPTII,S$GLB, | Performed by: FAMILY MEDICINE

## 2020-10-07 PROCEDURE — 90694 FLU VACCINE - QUADRIVALENT - ADJUVANTED: ICD-10-PCS | Mod: HCNC,S$GLB,, | Performed by: FAMILY MEDICINE

## 2020-10-07 PROCEDURE — 3074F SYST BP LT 130 MM HG: CPT | Mod: HCNC,CPTII,S$GLB, | Performed by: FAMILY MEDICINE

## 2020-10-07 PROCEDURE — 90694 VACC AIIV4 NO PRSRV 0.5ML IM: CPT | Mod: HCNC,S$GLB,, | Performed by: FAMILY MEDICINE

## 2020-10-07 PROCEDURE — 3074F PR MOST RECENT SYSTOLIC BLOOD PRESSURE < 130 MM HG: ICD-10-PCS | Mod: HCNC,CPTII,S$GLB, | Performed by: FAMILY MEDICINE

## 2020-10-07 PROCEDURE — 1101F PT FALLS ASSESS-DOCD LE1/YR: CPT | Mod: HCNC,CPTII,S$GLB, | Performed by: FAMILY MEDICINE

## 2020-10-07 PROCEDURE — 99999 PR PBB SHADOW E&M-EST. PATIENT-LVL IV: ICD-10-PCS | Mod: PBBFAC,HCNC,, | Performed by: FAMILY MEDICINE

## 2020-10-07 PROCEDURE — G0008 FLU VACCINE - QUADRIVALENT - ADJUVANTED: ICD-10-PCS | Mod: HCNC,S$GLB,, | Performed by: FAMILY MEDICINE

## 2020-10-07 PROCEDURE — 1159F MED LIST DOCD IN RCRD: CPT | Mod: HCNC,S$GLB,, | Performed by: FAMILY MEDICINE

## 2020-10-07 PROCEDURE — 3078F PR MOST RECENT DIASTOLIC BLOOD PRESSURE < 80 MM HG: ICD-10-PCS | Mod: HCNC,CPTII,S$GLB, | Performed by: FAMILY MEDICINE

## 2020-10-07 PROCEDURE — 99999 PR PBB SHADOW E&M-EST. PATIENT-LVL IV: CPT | Mod: PBBFAC,HCNC,, | Performed by: FAMILY MEDICINE

## 2020-10-07 PROCEDURE — 1126F PR PAIN SEVERITY QUANTIFIED, NO PAIN PRESENT: ICD-10-PCS | Mod: HCNC,S$GLB,, | Performed by: FAMILY MEDICINE

## 2020-10-07 PROCEDURE — G0008 ADMIN INFLUENZA VIRUS VAC: HCPCS | Mod: HCNC,S$GLB,, | Performed by: FAMILY MEDICINE

## 2020-10-07 PROCEDURE — 1126F AMNT PAIN NOTED NONE PRSNT: CPT | Mod: HCNC,S$GLB,, | Performed by: FAMILY MEDICINE

## 2020-10-07 PROCEDURE — 99214 PR OFFICE/OUTPT VISIT, EST, LEVL IV, 30-39 MIN: ICD-10-PCS | Mod: HCNC,25,S$GLB, | Performed by: FAMILY MEDICINE

## 2020-10-07 PROCEDURE — 1101F PR PT FALLS ASSESS DOC 0-1 FALLS W/OUT INJ PAST YR: ICD-10-PCS | Mod: HCNC,CPTII,S$GLB, | Performed by: FAMILY MEDICINE

## 2020-10-07 RX ORDER — IBUPROFEN 200 MG
200 TABLET ORAL EVERY 6 HOURS PRN
COMMUNITY
End: 2020-10-15

## 2020-10-07 NOTE — PROGRESS NOTES
THIS DOCUMENT WAS MADE IN PART WITH VOICE RECOGNITION SOFTWARE.  OCCASIONALLY THIS SOFTWARE WILL MISINTERPRET WORDS OR PHRASES.    Assessment and Plan:    1. Encounter for screening for malignant neoplasm of breast, unspecified screening modality  Plan for after December  - Mammo Digital Screening Bilat; Future    2. Immunization due  Influenza vaccine today    3. Encounter for screening mammogram for malignant neoplasm of breast   - Mammo Digital Screening Bilat; Future    4.  Bradycardia  Improved, continue follow-up Cardiology, pacemaker management    5.  Memory concerns  Improved after pacemaker placement, will monitor    6.  Hip pain  Improved, will monitor     ____________________________________________________________________  Subjective:    Chief Complaint:  Chief Complaint   Patient presents with    Follow-up        HPI:  Ladonna is a 84 y.o. year old     Bradycardia  Dual chamber pacemaker placed  Reports improvement in fatigue  Seems like her memory has improved as well  Previous MMSE score 24, today scores 29  Denies any negative side effects  Blood pressure well controlled  Plans on working in her yd today  Due for influenza and shingles vaccine      Past Medical History:  Past Medical History:   Diagnosis Date    Anxiety     Breast cancer 1986    mastectomy right    GERD (gastroesophageal reflux disease)     Hypertension     Osteoporosis, unspecified        Past Surgical History:  Past Surgical History:   Procedure Laterality Date    A-V CARDIAC PACEMAKER INSERTION N/A 9/9/2020    Procedure: INSERTION, CARDIAC PACEMAKER, DUAL CHAMBER - BIOTRONIK;  Surgeon: Rian Cramer MD;  Location: [facility];  Service: Cardiology;  Laterality: N/A;    AUGMENTATION OF BREAST  1989    right only     BREAST BIOPSY Left 1987    benign    BREAST SURGERY Right 1989    implant repair after rupture    BREAST SURGERY Right     implant repair after rupture    CATARACT EXTRACTION W/  INTRAOCULAR LENS  IMPLANT      bilateral    COLONOSCOPY  2011    polyp    COLONOSCOPY N/A 2020    Procedure: COLONOSCOPY;  Surgeon: Papo Akers MD;  Location[facility];  Service: Endoscopy;  Laterality: N/A;    dupuytrens      EYE SURGERY Bilateral     cataracts    MASTECTOMY Right 1986    TONSILLECTOMY         Family History:  Family History   Problem Relation Age of Onset    Cancer Maternal Aunt         colon    Breast cancer Maternal Aunt 50    Cancer Paternal Uncle         colon    Arthritis Mother     Parkinsonism Father     No Known Problems Sister     Henoch-Schonlein purpura Daughter     No Known Problems Son     No Known Problems Sister     Cancer Maternal Aunt         breast       Social History:  Social History     Socioeconomic History    Marital status:      Spouse name: Not on file    Number of children: Not on file    Years of education: Not on file    Highest education level: Not on file   Occupational History    Not on file   Social Needs    Financial resource strain: Not on file    Food insecurity     Worry: Not on file     Inability: Not on file    Transportation needs     Medical: Not on file     Non-medical: Not on file   Tobacco Use    Smoking status: Former Smoker     Packs/day: 0.10     Years: 24.00     Pack years: 2.40     Types: Cigarettes     Quit date: 1983     Years since quittin.3    Smokeless tobacco: Never Used    Tobacco comment: quit 30 years ago   Substance and Sexual Activity    Alcohol use: Yes     Comment: a few days a week    Drug use: No    Sexual activity: Not Currently   Lifestyle    Physical activity     Days per week: Not on file     Minutes per session: Not on file    Stress: Not on file   Relationships    Social connections     Talks on phone: Not on file     Gets together: Not on file     Attends Adventism service: Not on file     Active member of club or organization: Not on file     Attends meetings of clubs or  organizations: Not on file     Relationship status: Not on file   Other Topics Concern    Not on file   Social History Narrative    Not on file       Medications:  Current Outpatient Medications on File Prior to Visit   Medication Sig Dispense Refill    amitriptyline (ELAVIL) 10 MG tablet TAKE 1 TABLET(10 MG) BY MOUTH EVERY NIGHT AS NEEDED FOR INSOMNIA 90 tablet 1    amLODIPine (NORVASC) 5 MG tablet Take 1 tablet (5 mg total) by mouth every evening. 30 tablet 11    aspirin 325 MG tablet Take 325 mg by mouth as needed.       folic acid/multivit-min/lutein (CENTRUM SILVER ORAL) Take 1 capsule by mouth once daily.       ibuprofen (ADVIL,MOTRIN) 200 MG tablet Take 200 mg by mouth every 6 (six) hours as needed for Pain.      loratadine (CLARITIN) 10 mg tablet Take 1 tablet (10 mg total) by mouth once daily. (Patient taking differently: Take 10 mg by mouth daily as needed. ) 30 tablet 11    melatonin 1 mg Tab Take 3 mg by mouth as needed. 1 Tablet Oral Every evening      omeprazole (PRILOSEC) 20 MG capsule TAKE 1 CAPSULE(20 MG) BY MOUTH EVERY DAY 90 capsule 1    diclofenac sodium (VOLTAREN) 1 % Gel Apply 2 g topically 3 (three) times daily as needed. (Patient not taking: Reported on 10/7/2020) 100 g 3    fluticasone propionate (FLONASE) 50 mcg/actuation nasal spray 2 sprays (100 mcg total) by Each Nostril route once daily. (Patient not taking: Reported on 10/7/2020) 16 g 11     Current Facility-Administered Medications on File Prior to Visit   Medication Dose Route Frequency Provider Last Rate Last Dose    mupirocin 2 % ointment   Nasal Once Rian Cramer MD           Allergies:  Benadryl allergy decongestant and Sulfamethoxazole-trimethoprim    Immunizations:  Immunization History   Administered Date(s) Administered    Influenza 10/03/2009, 10/04/2010, 11/10/2011    Influenza (FLUAD) - Quadrivalent - Adjuvanted - PF *Preferred* (65+) 10/07/2020    Influenza - High Dose - PF (65 years and older)  "10/14/2015, 10/12/2016, 09/28/2017, 10/06/2018, 10/23/2019    Influenza - Trivalent (ADULT) 10/03/2009, 10/04/2010, 11/10/2011, 10/08/2013    Pneumococcal Conjugate - 13 Valent 10/12/2016    Pneumococcal Polysaccharide - 23 Valent 05/15/2018       Review of Systems:  Review of Systems   All other systems reviewed and are negative.      Objective:    Vitals:  Vitals:    10/07/20 1142   BP: 120/64   Pulse: 66   Temp: 97.9 °F (36.6 °C)   TempSrc: Temporal   Weight: 60.6 kg (133 lb 7.8 oz)   Height: 5' 7" (1.702 m)   PainSc: 0-No pain       Physical Exam  Constitutional:       General: She is not in acute distress.  HENT:      Head: Normocephalic and atraumatic.   Eyes:      Pupils: Pupils are equal, round, and reactive to light.   Neck:      Musculoskeletal: Neck supple.   Cardiovascular:      Rate and Rhythm: Normal rate and regular rhythm.      Heart sounds: No murmur. No friction rub.   Pulmonary:      Effort: Pulmonary effort is normal.      Breath sounds: Normal breath sounds.   Abdominal:      General: Bowel sounds are normal. There is no distension.      Palpations: Abdomen is soft.      Tenderness: There is no abdominal tenderness.   Skin:     General: Skin is warm and dry.      Findings: No rash.   Psychiatric:         Behavior: Behavior normal.         Data:  No previous labs, imaging, or notes available.        Grzegorz Gutierrez MD  Family Medicine      "

## 2020-10-15 ENCOUNTER — TELEPHONE (OUTPATIENT)
Dept: FAMILY MEDICINE | Facility: CLINIC | Age: 85
End: 2020-10-15

## 2020-10-15 DIAGNOSIS — M53.3 COCCYDYNIA: Primary | ICD-10-CM

## 2020-10-15 RX ORDER — NABUMETONE 500 MG/1
500 TABLET, FILM COATED ORAL 2 TIMES DAILY PRN
Qty: 30 TABLET | Refills: 0 | Status: SHIPPED | OUTPATIENT
Start: 2020-10-15 | End: 2020-12-21

## 2020-10-15 NOTE — TELEPHONE ENCOUNTER
Spoke with pt, she states she has coccyx pain 5/10. She has been taking Aleve for the pain. She said she has not had a recent fall. She is asking what Dr. Gutierrez recommends for her. I did ask if this was related to the hip pain discussed at visit last week and she said no, this is new for last few days

## 2020-10-15 NOTE — TELEPHONE ENCOUNTER
Please schedule patient to have x-ray of coccyx  Recommend Tylenol, I sent a prescription for nabumetone to take twice daily as well  She should use a donut pillow to sit on

## 2020-10-15 NOTE — TELEPHONE ENCOUNTER
----- Message from Daphne Payton sent at 10/15/2020 11:15 AM CDT -----  Contact: call  pt   [phone]  Type:  Same Day Appointment Request    Caller is requesting a same day appointment.  Caller declined first available appointment listed below.      Name of Caller: pt  When is the first available appointment?   Next week  Symptoms:   left  hip  pain//   pt had a fall  two weeks ago //  n ER   Best Call Back Number:  call  pt   [phone]  Additional Information:     pt  would  like to  be seen today

## 2020-10-16 ENCOUNTER — HOSPITAL ENCOUNTER (OUTPATIENT)
Dept: RADIOLOGY | Facility: HOSPITAL | Age: 85
Discharge: HOME | End: 2020-10-16
Attending: FAMILY MEDICINE
Payer: MEDICARE

## 2020-10-16 DIAGNOSIS — M53.3 COCCYDYNIA: ICD-10-CM

## 2020-10-16 PROCEDURE — 72220 XR SACRUM AND COCCYX: ICD-10-PCS | Mod: 26,HCNC,, | Performed by: RADIOLOGY

## 2020-10-16 PROCEDURE — 72220 X-RAY EXAM SACRUM TAILBONE: CPT | Mod: TC,HCNC,PN

## 2020-10-16 PROCEDURE — 72220 X-RAY EXAM SACRUM TAILBONE: CPT | Mod: 26,HCNC,, | Performed by: RADIOLOGY

## 2020-10-30 ENCOUNTER — TELEPHONE (OUTPATIENT)
Dept: FAMILY MEDICINE | Facility: CLINIC | Age: 85
End: 2020-10-30

## 2020-10-30 DIAGNOSIS — M16.12 PRIMARY OSTEOARTHRITIS OF LEFT HIP: Primary | ICD-10-CM

## 2020-10-30 NOTE — TELEPHONE ENCOUNTER
Spoke with pt, states she is without power, states her hip is not hurting, states it is actually better, advised to call us back if it starts to hurt again and we will schedule her to see Pain management  Voiced understanding

## 2020-10-30 NOTE — TELEPHONE ENCOUNTER
Spoke with pt daughter.  States that pt is still in pain and is constantly stating that she is sore.   Daughter states that the pain is mostly in the morning, but does not move a lot during the day.    Please advise.

## 2020-10-30 NOTE — TELEPHONE ENCOUNTER
----- Message from Nasreen Villarreal, Patient Care Assistant sent at 10/30/2020 10:15 AM CDT -----  Regarding: advice  Contact: dirk rod's daughter  Type: Needs Medical Advice  Who Called:  dirk rod's daughter  Symptoms (please be specific):  left hip pain  How long has patient had these symptoms:  off and on everyday  Pharmacy name and phone #:    Best Call Back Number: [phone]   Additional Information: please call dirk rod's daughter to advice. Thanks!

## 2020-11-04 ENCOUNTER — TELEPHONE (OUTPATIENT)
Dept: FAMILY MEDICINE | Facility: CLINIC | Age: 85
End: 2020-11-04

## 2020-11-04 ENCOUNTER — OFFICE VISIT (OUTPATIENT)
Dept: FAMILY MEDICINE | Facility: CLINIC | Age: 85
End: 2020-11-04
Payer: MEDICARE

## 2020-11-04 VITALS
TEMPERATURE: 97 F | SYSTOLIC BLOOD PRESSURE: 112 MMHG | OXYGEN SATURATION: 95 % | HEIGHT: 67 IN | WEIGHT: 130.5 LBS | HEART RATE: 86 BPM | DIASTOLIC BLOOD PRESSURE: 60 MMHG | BODY MASS INDEX: 20.48 KG/M2

## 2020-11-04 DIAGNOSIS — R33.9 URINARY RETENTION: ICD-10-CM

## 2020-11-04 DIAGNOSIS — R39.15 URGENCY OF URINATION: Primary | ICD-10-CM

## 2020-11-04 DIAGNOSIS — N30.00 ACUTE CYSTITIS WITHOUT HEMATURIA: Primary | ICD-10-CM

## 2020-11-04 PROCEDURE — 99214 OFFICE O/P EST MOD 30 MIN: CPT | Mod: HCNC,S$GLB,, | Performed by: FAMILY MEDICINE

## 2020-11-04 PROCEDURE — 1126F PR PAIN SEVERITY QUANTIFIED, NO PAIN PRESENT: ICD-10-PCS | Mod: HCNC,S$GLB,, | Performed by: FAMILY MEDICINE

## 2020-11-04 PROCEDURE — 3078F DIAST BP <80 MM HG: CPT | Mod: HCNC,CPTII,S$GLB, | Performed by: FAMILY MEDICINE

## 2020-11-04 PROCEDURE — 99999 PR PBB SHADOW E&M-EST. PATIENT-LVL IV: CPT | Mod: PBBFAC,HCNC,, | Performed by: FAMILY MEDICINE

## 2020-11-04 PROCEDURE — 99214 PR OFFICE/OUTPT VISIT, EST, LEVL IV, 30-39 MIN: ICD-10-PCS | Mod: HCNC,S$GLB,, | Performed by: FAMILY MEDICINE

## 2020-11-04 PROCEDURE — 1101F PR PT FALLS ASSESS DOC 0-1 FALLS W/OUT INJ PAST YR: ICD-10-PCS | Mod: HCNC,CPTII,S$GLB, | Performed by: FAMILY MEDICINE

## 2020-11-04 PROCEDURE — 3074F SYST BP LT 130 MM HG: CPT | Mod: HCNC,CPTII,S$GLB, | Performed by: FAMILY MEDICINE

## 2020-11-04 PROCEDURE — 1126F AMNT PAIN NOTED NONE PRSNT: CPT | Mod: HCNC,S$GLB,, | Performed by: FAMILY MEDICINE

## 2020-11-04 PROCEDURE — 99999 PR PBB SHADOW E&M-EST. PATIENT-LVL IV: ICD-10-PCS | Mod: PBBFAC,HCNC,, | Performed by: FAMILY MEDICINE

## 2020-11-04 PROCEDURE — 1159F MED LIST DOCD IN RCRD: CPT | Mod: HCNC,S$GLB,, | Performed by: FAMILY MEDICINE

## 2020-11-04 PROCEDURE — 3074F PR MOST RECENT SYSTOLIC BLOOD PRESSURE < 130 MM HG: ICD-10-PCS | Mod: HCNC,CPTII,S$GLB, | Performed by: FAMILY MEDICINE

## 2020-11-04 PROCEDURE — 81001 URINALYSIS AUTO W/SCOPE: CPT | Mod: HCNC

## 2020-11-04 PROCEDURE — 1159F PR MEDICATION LIST DOCUMENTED IN MEDICAL RECORD: ICD-10-PCS | Mod: HCNC,S$GLB,, | Performed by: FAMILY MEDICINE

## 2020-11-04 PROCEDURE — 3078F PR MOST RECENT DIASTOLIC BLOOD PRESSURE < 80 MM HG: ICD-10-PCS | Mod: HCNC,CPTII,S$GLB, | Performed by: FAMILY MEDICINE

## 2020-11-04 PROCEDURE — 1101F PT FALLS ASSESS-DOCD LE1/YR: CPT | Mod: HCNC,CPTII,S$GLB, | Performed by: FAMILY MEDICINE

## 2020-11-04 RX ORDER — CEPHALEXIN 500 MG/1
500 CAPSULE ORAL EVERY 12 HOURS
Qty: 14 CAPSULE | Refills: 0 | Status: SHIPPED | OUTPATIENT
Start: 2020-11-04 | End: 2020-12-21

## 2020-11-04 NOTE — TELEPHONE ENCOUNTER
----- Message from Violet Edwards sent at 11/4/2020  1:12 PM CST -----  Type: Needs Medical Advice  Who Called:  Nicole Yanes (Daughter)  Symptoms (please be specific):  unable to urinate  Best Call Back Number:   Additional Information: Patient has been complaining of not being able to urinate. Not giving any available appointments in [place] office until tomorrow, declined to schedule at [facility].

## 2020-11-04 NOTE — TELEPHONE ENCOUNTER
Spoke with daughter and she states that pt called and states that she is unable to urinate. Daughter states that she gets like this at times thinking something is wrong but got her to relax and she was able to go to restroom. She states that she has urgency and that is the only symptom. Daughter believes that she has UTI. Coming for appt this afternoon.

## 2020-11-04 NOTE — PROGRESS NOTES
THIS DOCUMENT WAS MADE IN PART WITH VOICE RECOGNITION SOFTWARE.  OCCASIONALLY THIS SOFTWARE WILL MISINTERPRET WORDS OR PHRASES.    Assessment and Plan:  1. Acute cystitis without hematuria  - cephALEXin (KEFLEX) 500 MG capsule; Take 1 capsule (500 mg total) by mouth every 12 (twelve) hours.  Dispense: 14 capsule; Refill: 0  - Urinalysis, Reflex to Urine Culture Urine, Catheterized; Future  - Urinalysis, Reflex to Urine Culture Urine, Catheterized    2. Urinary retention  Referred to emergency department for catheter placement  Referral to Urology, stat referral  Not a very large amount, may still be due to the urinary tract infection        ______________________________________________________________________  Subjective:    Chief Complaint:  Chief Complaint   Patient presents with    Urinary Tract Infection        HPI:  Ladonna is a 85 y.o. year old     Lower urinary tract symptoms  Patient has been unable to urinate since this morning, maybe only a tsp or 2  Reports some pelvic discomfort, no prior history of this condition  No new medications  Denies any fever or other systemic symptoms  Got about 250 cc residual      Past Medical History:  Past Medical History:   Diagnosis Date    Anxiety     Breast cancer 1986    mastectomy right    GERD (gastroesophageal reflux disease)     Hypertension     Osteoporosis, unspecified        Past Surgical History:  Past Surgical History:   Procedure Laterality Date    A-V CARDIAC PACEMAKER INSERTION N/A 9/9/2020    Procedure: INSERTION, CARDIAC PACEMAKER, DUAL CHAMBER - BIOTRONIK;  Surgeon: Rian Cramer MD;  Location[facility];  Service: Cardiology;  Laterality: N/A;    AUGMENTATION OF BREAST  1989    right only     BREAST BIOPSY Left 1987    benign    BREAST SURGERY Right 1989    implant repair after rupture    BREAST SURGERY Right     implant repair after rupture    CATARACT EXTRACTION W/  INTRAOCULAR LENS IMPLANT      bilateral    COLONOSCOPY  2011     polyp    COLONOSCOPY N/A 2020    Procedure: COLONOSCOPY;  Surgeon: Papo Akers MD;  Location: [facility];  Service: Endoscopy;  Laterality: N/A;    dupuytrens      EYE SURGERY Bilateral     cataracts    MASTECTOMY Right 1986    TONSILLECTOMY         Family History:  Family History   Problem Relation Age of Onset    Cancer Maternal Aunt         colon    Breast cancer Maternal Aunt 50    Cancer Paternal Uncle         colon    Arthritis Mother     Parkinsonism Father     No Known Problems Sister     Henoch-Schonlein purpura Daughter     No Known Problems Son     No Known Problems Sister     Cancer Maternal Aunt         breast       Social History:  Social History     Socioeconomic History    Marital status:      Spouse name: Not on file    Number of children: Not on file    Years of education: Not on file    Highest education level: Not on file   Occupational History    Not on file   Social Needs    Financial resource strain: Not on file    Food insecurity     Worry: Not on file     Inability: Not on file    Transportation needs     Medical: Not on file     Non-medical: Not on file   Tobacco Use    Smoking status: Former Smoker     Packs/day: 0.10     Years: 24.00     Pack years: 2.40     Types: Cigarettes     Quit date: 1983     Years since quittin.4    Smokeless tobacco: Never Used    Tobacco comment: quit 30 years ago   Substance and Sexual Activity    Alcohol use: Yes     Comment: a few days a week    Drug use: No    Sexual activity: Not Currently   Lifestyle    Physical activity     Days per week: Not on file     Minutes per session: Not on file    Stress: Not on file   Relationships    Social connections     Talks on phone: Not on file     Gets together: Not on file     Attends Orthodoxy service: Not on file     Active member of club or organization: Not on file     Attends meetings of clubs or organizations: Not on file     Relationship status: Not  on file   Other Topics Concern    Not on file   Social History Narrative    Not on file       Medications:  Current Outpatient Medications on File Prior to Visit   Medication Sig Dispense Refill    amitriptyline (ELAVIL) 10 MG tablet TAKE 1 TABLET(10 MG) BY MOUTH EVERY NIGHT AS NEEDED FOR INSOMNIA 90 tablet 1    amLODIPine (NORVASC) 5 MG tablet Take 1 tablet (5 mg total) by mouth every evening. 30 tablet 11    aspirin 325 MG tablet Take 325 mg by mouth as needed.       folic acid/multivit-min/lutein (CENTRUM SILVER ORAL) Take 1 capsule by mouth once daily.       melatonin 1 mg Tab Take 3 mg by mouth as needed. 1 Tablet Oral Every evening      nabumetone (RELAFEN) 500 MG tablet Take 1 tablet (500 mg total) by mouth 2 (two) times daily as needed for Pain. 30 tablet 0    omeprazole (PRILOSEC) 20 MG capsule TAKE 1 CAPSULE(20 MG) BY MOUTH EVERY DAY 90 capsule 1    [DISCONTINUED] diclofenac sodium (VOLTAREN) 1 % Gel Apply 2 g topically 3 (three) times daily as needed. (Patient not taking: Reported on 10/7/2020) 100 g 3    [DISCONTINUED] fluticasone propionate (FLONASE) 50 mcg/actuation nasal spray 2 sprays (100 mcg total) by Each Nostril route once daily. (Patient not taking: Reported on 10/7/2020) 16 g 11    [DISCONTINUED] loratadine (CLARITIN) 10 mg tablet Take 1 tablet (10 mg total) by mouth once daily. (Patient taking differently: Take 10 mg by mouth daily as needed. ) 30 tablet 11     Current Facility-Administered Medications on File Prior to Visit   Medication Dose Route Frequency Provider Last Rate Last Dose    mupirocin 2 % ointment   Nasal Once Rian Cramer MD           Allergies:  Benadryl allergy decongestant and Sulfamethoxazole-trimethoprim    Immunizations:  Immunization History   Administered Date(s) Administered    Influenza 10/03/2009, 10/04/2010, 11/10/2011    Influenza (FLUAD) - Quadrivalent - Adjuvanted - PF *Preferred* (65+) 10/07/2020    Influenza - High Dose - PF (65 years  "and older) 10/14/2015, 10/12/2016, 09/28/2017, 10/06/2018, 10/23/2019    Influenza - Trivalent (ADULT) 10/03/2009, 10/04/2010, 11/10/2011, 10/08/2013    Pneumococcal Conjugate - 13 Valent 10/12/2016    Pneumococcal Polysaccharide - 23 Valent 05/15/2018       Review of Systems:  Review of Systems   Genitourinary: Positive for frequency.   All other systems reviewed and are negative.      Objective:    Vitals:  Vitals:    11/04/20 1529   BP: 112/60   Pulse: 86   Temp: 96.8 °F (36 °C)   TempSrc: Temporal   SpO2: 95%   Weight: 59.2 kg (130 lb 8.2 oz)   Height: 5' 7" (1.702 m)   PainSc: 0-No pain       Physical Exam  Vitals signs reviewed.   Constitutional:       General: She is not in acute distress.  HENT:      Head: Normocephalic and atraumatic.   Eyes:      Pupils: Pupils are equal, round, and reactive to light.   Neck:      Musculoskeletal: Neck supple.   Cardiovascular:      Rate and Rhythm: Normal rate and regular rhythm.      Heart sounds: No murmur. No friction rub.   Pulmonary:      Effort: Pulmonary effort is normal.      Breath sounds: Normal breath sounds.   Abdominal:      General: Bowel sounds are normal. There is no distension.      Palpations: Abdomen is soft.      Tenderness: There is no abdominal tenderness.       Skin:     General: Skin is warm and dry.      Findings: No rash.   Psychiatric:         Behavior: Behavior normal.         Data:  No previous labs, imaging, or notes available.        Grzegorz Gutierrez MD  Family Medicine      "

## 2020-11-05 ENCOUNTER — TELEPHONE (OUTPATIENT)
Dept: UROLOGY | Facility: CLINIC | Age: 85
End: 2020-11-05

## 2020-11-05 ENCOUNTER — TELEPHONE (OUTPATIENT)
Dept: FAMILY MEDICINE | Facility: CLINIC | Age: 85
End: 2020-11-05

## 2020-11-05 LAB
BILIRUB UR QL STRIP: NEGATIVE
CLARITY UR REFRACT.AUTO: ABNORMAL
COLOR UR AUTO: YELLOW
GLUCOSE UR QL STRIP: NEGATIVE
HGB UR QL STRIP: NEGATIVE
KETONES UR QL STRIP: ABNORMAL
LEUKOCYTE ESTERASE UR QL STRIP: NEGATIVE
MICROSCOPIC COMMENT: NORMAL
NITRITE UR QL STRIP: NEGATIVE
PH UR STRIP: 6 [PH] (ref 5–8)
PROT UR QL STRIP: NEGATIVE
RBC #/AREA URNS AUTO: 0 /HPF (ref 0–4)
SP GR UR STRIP: 1.02 (ref 1–1.03)
SQUAMOUS #/AREA URNS AUTO: 0 /HPF
URN SPEC COLLECT METH UR: ABNORMAL
WBC #/AREA URNS AUTO: 1 /HPF (ref 0–5)

## 2020-11-05 NOTE — TELEPHONE ENCOUNTER
----- Message from Javier Campbell sent at 11/5/2020 10:42 AM CST -----  Regarding: Daughter/ Nicole [phone]  The patient has a 1 oclock appmnt with Dr. Soriano today. Fax the referral to [phone].    Thank you

## 2020-11-05 NOTE — TELEPHONE ENCOUNTER
----- Message from Keisha Mccain sent at 11/5/2020  9:59 AM CST -----  Regarding: Advice  Contact: patient's daughter  Type: Needs Medical Advice    Who Called:  patient's daughter  Symptoms (please be specific):  n/a  How long has patient had these symptoms:  n/a  Pharmacy name and phone #:  n/a  Best Call Back Number: [phone]  Additional Information: asking to speak with , pt cannot be seen by urologist until next week

## 2020-11-05 NOTE — TELEPHONE ENCOUNTER
Advised no openings today, no doctors in clinic tomorrow, no openings Monday. Scheduled with Dr. Alvares 11/10

## 2020-11-05 NOTE — TELEPHONE ENCOUNTER
----- Message from Melba Lr sent at 11/5/2020  8:04 AM CST -----  Contact: Daughter Nicole  Patient has a referral in the system, couldn't urinate saw Dr Gutierrez and he drained her bladder then sent her to [facility] ER in [place] and had a catheter put in .  Call Nicole back at [phone] to get her in today and thanks.

## 2020-11-05 NOTE — TELEPHONE ENCOUNTER
----- Message from Haleigh Avery MA sent at 11/5/2020  9:30 AM CST -----  Regarding: Same day appt  Call Back # [phone] - Nicole ZAIDI daughter in law is requesting a same day appt, Catheter/ Ed follow up

## 2020-11-05 NOTE — TELEPHONE ENCOUNTER
Spoke with pt and she states that nurse Veronica said that there was no way to get her in with anyone until next week. Daughter is concerned because pt was not given a leg bag and she is concerned that pt will fall trying to hold the bag. She does have a call in to Dr Soriano's office to see if they can see the pt. She also states that if we can get her in today with someone to please let her know. She will reach out to us if she is able to see Dr Soriano today. I have reached out to lead in urology and waiting on a response.

## 2020-11-10 ENCOUNTER — OFFICE VISIT (OUTPATIENT)
Dept: UROLOGY | Facility: CLINIC | Age: 85
End: 2020-11-10
Payer: MEDICARE

## 2020-11-10 VITALS — WEIGHT: 130.5 LBS | BODY MASS INDEX: 20.48 KG/M2 | HEIGHT: 67 IN

## 2020-11-10 DIAGNOSIS — R33.9 URINARY RETENTION: ICD-10-CM

## 2020-11-10 PROCEDURE — 1159F MED LIST DOCD IN RCRD: CPT | Mod: HCNC,S$GLB,, | Performed by: UROLOGY

## 2020-11-10 PROCEDURE — 99204 PR OFFICE/OUTPT VISIT, NEW, LEVL IV, 45-59 MIN: ICD-10-PCS | Mod: HCNC,S$GLB,, | Performed by: UROLOGY

## 2020-11-10 PROCEDURE — 1159F PR MEDICATION LIST DOCUMENTED IN MEDICAL RECORD: ICD-10-PCS | Mod: HCNC,S$GLB,, | Performed by: UROLOGY

## 2020-11-10 PROCEDURE — 99999 PR PBB SHADOW E&M-EST. PATIENT-LVL III: CPT | Mod: PBBFAC,HCNC,, | Performed by: UROLOGY

## 2020-11-10 PROCEDURE — 1101F PT FALLS ASSESS-DOCD LE1/YR: CPT | Mod: HCNC,CPTII,S$GLB, | Performed by: UROLOGY

## 2020-11-10 PROCEDURE — 99999 PR PBB SHADOW E&M-EST. PATIENT-LVL III: ICD-10-PCS | Mod: PBBFAC,HCNC,, | Performed by: UROLOGY

## 2020-11-10 PROCEDURE — 1126F PR PAIN SEVERITY QUANTIFIED, NO PAIN PRESENT: ICD-10-PCS | Mod: HCNC,S$GLB,, | Performed by: UROLOGY

## 2020-11-10 PROCEDURE — 1126F AMNT PAIN NOTED NONE PRSNT: CPT | Mod: HCNC,S$GLB,, | Performed by: UROLOGY

## 2020-11-10 PROCEDURE — 99204 OFFICE O/P NEW MOD 45 MIN: CPT | Mod: HCNC,S$GLB,, | Performed by: UROLOGY

## 2020-11-10 PROCEDURE — 1101F PR PT FALLS ASSESS DOC 0-1 FALLS W/OUT INJ PAST YR: ICD-10-PCS | Mod: HCNC,CPTII,S$GLB, | Performed by: UROLOGY

## 2020-11-10 RX ORDER — TAMSULOSIN HYDROCHLORIDE 0.4 MG/1
CAPSULE ORAL
COMMUNITY
Start: 2020-11-06 | End: 2021-02-04

## 2020-11-10 NOTE — PROGRESS NOTES
UROLOGY [place]  11 10 20    Cc incomplete bladder emptying    Age 85, had urinary retention of 400 ml. A rivera was placed. Pt would like the rivera removed if possible. Daughter is present. Pt lives alone.     Pt not having any weakness or viral illness, or having any neurologic disease.       [facility]    Surgical:  has a past surgical history that includes Colonoscopy (2011); dupuytrens; Tonsillectomy; Eye surgery (Bilateral); Cataract extraction w/  intraocular lens implant; Breast surgery (Right, 1989); Breast surgery (Right); Breast biopsy (Left, 1987); Mastectomy (Right, 1986); Augmentation of breast (1989); Colonoscopy (N/A, 1/28/2020); and A-V cardiac pacemaker insertion (N/A, 9/9/2020).    Medical:  has a past medical history of Anxiety, Breast cancer, GERD (gastroesophageal reflux disease), Hypertension, and Osteoporosis, unspecified.    Familial: father with parkinsons, maternal aunt with breast ca    Social:     Meds:   Current Outpatient Medications on File Prior to Visit   Medication Sig Dispense Refill    amitriptyline (ELAVIL) 10 MG tablet TAKE 1 TABLET(10 MG) BYNIA 90 tablet 1    amLODIPine (NORVASC) 5 MG tablet Take 1 tablet (5 mg total) byvening. 30 tablet 11    aspirin 325 MG tablet Take 3 as needed.       cephALEXin (KEFLEX) 500 MG capsule Take 1 capsule (500 mg tot. 14 capsule 0    folic acid/multivit-min/lutein (CENTRUM SILVER ORAL) Take 1 capsule by mouth once daily.       melatonin 1 mg Tab Take 3 clive evening      nabumetone (RELAFEN) 500 MG tablet Take 1 tablet (500 mg total) r Pain. 30 tablet 0    omeprazole (PRILOSEC) 20 MG capsule TAKE 1 CAPS 90 capsule 1     REVIEW OF SYSTEMS  GENERAL:  No headaches or dizzy spells.   HEENT: vision preserved. Sinuses: No complaints.   CARDIOPULMONARY: No swelling of the legs; no chest pain. No shortness of breath.   GASTROINTESTINAL: has heartburn. Denies diarrhea; denies constipation, no blood or mucus in stools.   GENITOURINARY: Denies dysuria,  bleeding or incontinence.   MUSCULOSKELETAL: has arthritic complaints such as pain or stiffness.   PSYCHIATRIC: No history of depression or anxiety.   ENDOCRINOLOGIC: No reports of sweating, cold or heat intolerance. No polyuria or polydipsia.   NEUROLOGICAL: No dizziness, syncope, paralysis  LYMPHATICS: No enlarged nodes. No history of splenectomy.    Pt alert, oriented, no distress  HEENT: wnl.  Neck: supple, no JVD, no lymphadenopathy  Chest: CV NSR  Lungs: normal chest expansion, no labored breathing  Abdomen flat, nontender, no organomegaly, no masses.  Extremities: no edema, peripheral pulses nl  Neuro: preserved    IMP    Urinary retention, I suggested she get the rivera out today (has been 6 days since her low-volume retention). If she fails to void can come back to us and we will replace the rivera.  If it looks like she will need long term cathete will teach self catheterization  Might need urethral dilatation, eventually cystoscopy, bladder scan  RTC prn

## 2020-11-10 NOTE — LETTER
November 10, 2020      Grzegorz Gutierrez MD  [address]  [facility] [address]           [facility]  [address] [place] [address]  Phone: [phone]  Fax: [phone]          Patient: Ladonna St   MR Number: [id number]   YOB: 1935   Date of Visit: 11/10/2020       Dear Dr. Grzegorz Gutierrez:    Thank you for referring Ladonna St to me for evaluation. Attached you will find relevant portions of my assessment and plan of care.    If you have questions, please do not hesitate to call me. I look forward to following Ladonna St along with you.    Sincerely,    Jordin Alvares MD    Enclosure  CC:  No Recipients    If you would like to receive this communication electronically, please contact [email or web address] or [phone] to request more information on [employer] Link access.    For providers and/or their staff who would like to refer a patient to [facility], please contact us through our one-stop-shop provider referral line, [facility], at [phone].    If you feel you have received this communication in error or would no longer like to receive these types of communications, please e-mail [email or web address]

## 2020-12-11 ENCOUNTER — PATIENT MESSAGE (OUTPATIENT)
Dept: OTHER | Facility: OTHER | Age: 85
End: 2020-12-11

## 2020-12-17 ENCOUNTER — TELEPHONE (OUTPATIENT)
Dept: FAMILY MEDICINE | Facility: CLINIC | Age: 85
End: 2020-12-17

## 2020-12-17 DIAGNOSIS — M25.571 ACUTE RIGHT ANKLE PAIN: Primary | ICD-10-CM

## 2020-12-17 NOTE — TELEPHONE ENCOUNTER
----- Message from Lester Linares sent at 12/17/2020  2:41 PM CST -----  Type: Needs Medical Advice    Who Called:  Alexandrea (Caregiver)  Best Call Back Number: [phone]  Additional Information: Caller would like to discuss receiving orders for an x-ray. Please call to advise. Thanks!

## 2020-12-17 NOTE — TELEPHONE ENCOUNTER
Daughter in law, Alexandrea states that patient fell on Thanksgiving and started having pain in right ankle later that evening. Right ankle is still swollen, bruised and sore.She stated that patient is walking fine, but would like to have it x-rayed anyway. I urged them to have her brought in to be evaluated, but they declined due to covid concerns. I explained to her that Dr. Gutierrez was out of the office until 1/11/20.Family was adamant about sending message to PCP to obtain order for x-ray of right ankle.    Please advise? X-ray pended if appropriate.

## 2020-12-18 ENCOUNTER — HOSPITAL ENCOUNTER (OUTPATIENT)
Dept: RADIOLOGY | Facility: HOSPITAL | Age: 85
Discharge: HOME | End: 2020-12-18
Attending: FAMILY MEDICINE
Payer: MEDICARE

## 2020-12-18 ENCOUNTER — TELEPHONE (OUTPATIENT)
Dept: FAMILY MEDICINE | Facility: CLINIC | Age: 85
End: 2020-12-18

## 2020-12-18 DIAGNOSIS — S82.899A: Primary | ICD-10-CM

## 2020-12-18 DIAGNOSIS — M25.571 ACUTE RIGHT ANKLE PAIN: ICD-10-CM

## 2020-12-18 PROCEDURE — 73610 X-RAY EXAM OF ANKLE: CPT | Mod: TC,HCNC,PN,RT

## 2020-12-18 PROCEDURE — 73610 XR ANKLE COMPLETE 3 VIEW RIGHT: ICD-10-PCS | Mod: 26,HCNC,RT, | Performed by: RADIOLOGY

## 2020-12-18 PROCEDURE — 73610 X-RAY EXAM OF ANKLE: CPT | Mod: 26,HCNC,RT, | Performed by: RADIOLOGY

## 2020-12-21 ENCOUNTER — OFFICE VISIT (OUTPATIENT)
Dept: ORTHOPEDICS | Facility: CLINIC | Age: 85
End: 2020-12-21
Payer: MEDICARE

## 2020-12-21 VITALS
DIASTOLIC BLOOD PRESSURE: 74 MMHG | HEIGHT: 67 IN | BODY MASS INDEX: 20.4 KG/M2 | WEIGHT: 130 LBS | SYSTOLIC BLOOD PRESSURE: 124 MMHG | HEART RATE: 74 BPM

## 2020-12-21 DIAGNOSIS — S82.899A: ICD-10-CM

## 2020-12-21 PROCEDURE — 1159F MED LIST DOCD IN RCRD: CPT | Mod: HCNC,S$GLB,, | Performed by: ORTHOPAEDIC SURGERY

## 2020-12-21 PROCEDURE — 1100F PR PT FALLS ASSESS DOC 2+ FALLS/FALL W/INJURY/YR: ICD-10-PCS | Mod: HCNC,CPTII,S$GLB, | Performed by: ORTHOPAEDIC SURGERY

## 2020-12-21 PROCEDURE — 99203 PR OFFICE/OUTPT VISIT, NEW, LEVL III, 30-44 MIN: ICD-10-PCS | Mod: HCNC,57,S$GLB, | Performed by: ORTHOPAEDIC SURGERY

## 2020-12-21 PROCEDURE — 1125F AMNT PAIN NOTED PAIN PRSNT: CPT | Mod: HCNC,S$GLB,, | Performed by: ORTHOPAEDIC SURGERY

## 2020-12-21 PROCEDURE — 27786 PR CLOSED RX DIST FIBULA FX: ICD-10-PCS | Mod: HCNC,RT,S$GLB, | Performed by: ORTHOPAEDIC SURGERY

## 2020-12-21 PROCEDURE — 97760 PR ORTHOTIC MGMT&TRAINJ INITIAL ENC EA 15 MINS: ICD-10-PCS | Mod: HCNC,GP,S$GLB, | Performed by: ORTHOPAEDIC SURGERY

## 2020-12-21 PROCEDURE — 3078F DIAST BP <80 MM HG: CPT | Mod: HCNC,CPTII,S$GLB, | Performed by: ORTHOPAEDIC SURGERY

## 2020-12-21 PROCEDURE — 3074F PR MOST RECENT SYSTOLIC BLOOD PRESSURE < 130 MM HG: ICD-10-PCS | Mod: HCNC,CPTII,S$GLB, | Performed by: ORTHOPAEDIC SURGERY

## 2020-12-21 PROCEDURE — 99999 PR PBB SHADOW E&M-EST. PATIENT-LVL III: ICD-10-PCS | Mod: PBBFAC,HCNC,, | Performed by: ORTHOPAEDIC SURGERY

## 2020-12-21 PROCEDURE — 1100F PTFALLS ASSESS-DOCD GE2>/YR: CPT | Mod: HCNC,CPTII,S$GLB, | Performed by: ORTHOPAEDIC SURGERY

## 2020-12-21 PROCEDURE — 1125F PR PAIN SEVERITY QUANTIFIED, PAIN PRESENT: ICD-10-PCS | Mod: HCNC,S$GLB,, | Performed by: ORTHOPAEDIC SURGERY

## 2020-12-21 PROCEDURE — 3288F FALL RISK ASSESSMENT DOCD: CPT | Mod: HCNC,CPTII,S$GLB, | Performed by: ORTHOPAEDIC SURGERY

## 2020-12-21 PROCEDURE — 3288F PR FALLS RISK ASSESSMENT DOCUMENTED: ICD-10-PCS | Mod: HCNC,CPTII,S$GLB, | Performed by: ORTHOPAEDIC SURGERY

## 2020-12-21 PROCEDURE — 99999 PR PBB SHADOW E&M-EST. PATIENT-LVL III: CPT | Mod: PBBFAC,HCNC,, | Performed by: ORTHOPAEDIC SURGERY

## 2020-12-21 PROCEDURE — 97760 ORTHOTIC MGMT&TRAING 1ST ENC: CPT | Mod: HCNC,GP,S$GLB, | Performed by: ORTHOPAEDIC SURGERY

## 2020-12-21 PROCEDURE — 3078F PR MOST RECENT DIASTOLIC BLOOD PRESSURE < 80 MM HG: ICD-10-PCS | Mod: HCNC,CPTII,S$GLB, | Performed by: ORTHOPAEDIC SURGERY

## 2020-12-21 PROCEDURE — 3074F SYST BP LT 130 MM HG: CPT | Mod: HCNC,CPTII,S$GLB, | Performed by: ORTHOPAEDIC SURGERY

## 2020-12-21 PROCEDURE — 1159F PR MEDICATION LIST DOCUMENTED IN MEDICAL RECORD: ICD-10-PCS | Mod: HCNC,S$GLB,, | Performed by: ORTHOPAEDIC SURGERY

## 2020-12-21 PROCEDURE — 27786 TREATMENT OF ANKLE FRACTURE: CPT | Mod: HCNC,RT,S$GLB, | Performed by: ORTHOPAEDIC SURGERY

## 2020-12-21 PROCEDURE — 99203 OFFICE O/P NEW LOW 30 MIN: CPT | Mod: HCNC,57,S$GLB, | Performed by: ORTHOPAEDIC SURGERY

## 2020-12-21 NOTE — PROGRESS NOTES
"HPI: Ladonna St is a 85 y.o. female who was referred to me by Dr. Encarnacion and was seen in consultation today for right ankle pain. Her son is also my patient.   The pain began acutely on 112/25/20 when she tripped on the carpet and rolled her ankle. She has been walking in a tennis shoe. She rates her pain as 3/10 today.     She is here with her daughter today.     PAST MEDICAL/SURGICAL/FAMILY/SOCIAL/ HISTORY: REVIEWED    ALLERGIES/MEDICATIONS: REVIEWED       Review of Systems:     Constitution: Negative.   HEENT: Negative.   Eyes: Negative.   Cardiovascular: Negative.   Respiratory: Negative.   Endocrine: Negative.   Hematologic/Lymphatic: Negative.   Skin: Negative.   Musculoskeletal: Positive for right ankle pain   Gastrointestinal: Negative.   Genitourinary: Negative.   Neurological: Negative.   Psychiatric/Behavioral: Negative.   Allergic/Immunologic: Negative.       PHYSICAL EXAM:  Vitals:    12/21/20 1339   BP: 124/74   Pulse: 74     Ht Readings from Last 1 Encounters:   12/21/20 5' 7" (1.702 m)     Wt Readings from Last 1 Encounters:   12/21/20 59 kg (130 lb)         GENERAL: Well developed, well nourished, no acute distress. Very, very pleasant.   SKIN: Skin is intact. No atrophy, abrasions or lesions are noted.   Neurological: Normal mental status. Appropriate and conversant. Alert and oriented x 3.  GAIT: Walks with a mildly antalgic gait.    Right lower extremity compared with LLE:  2+ dorsalis pedis pulse.  Capillary refill < 3 seconds.  Mildly decreased  range of motion of the ankle.  5/5 strength EHL, FHL, tibialis anterior, gastrocsoleus, tibialis posterior and peroneals. Sensation to light touch intact sural, saphenous, superficial peroneal and deep peroneal nerves. Mild to moderate swelling of the lateral ankle, no  ecchymosis or deformity. No lymphadenopathy, no masses or tumors palpated.   tenderness to palpation at the distal fibula.     XRAYS:   3 views of right ankle obtained and " reviewed today show impacted fracture of the lateral malleolus with some lateral translation. Ankle mortise o/w intact. Mild degenerative changes. Fracture appears to be healing.     ASSESSMENT: Right lateral malleolus fracture    PLAN:   I spent 35 minutes in consulation with the patient today. More than half the time was spent counseling the patient on her condition. Non-operative treatment.  We performed a custom orthotic/brace adjustment, fitting and training with the patient today. The patient demonstrated understanding and proper care. This was performed for 15 minutes.  Short boot  was given. Weight bearing as tolerated in boot.   F/u 3 weeks with xray of the right ankle.   I also referred her to Dr. Feliciano for her right hand dupuytren's contracture and gave her a handout on xiaflex.

## 2020-12-21 NOTE — LETTER
December 22, 2020      Aleja Encarnacion MD  [address]  [facility] [address]           [facility]  [address] [place] [address]  Phone: [phone]          Patient: Ladonna St   MR Number: [id number]   YOB: 1935   Date of Visit: 12/21/2020       Dear Dr. Aleja Encarnacion:    Thank you for referring Ladonna St to me for evaluation. Attached you will find relevant portions of my assessment and plan of care.    If you have questions, please do not hesitate to call me. I look forward to following Ladonna St along with you.    Sincerely,    Tito Downing MD    Enclosure  CC:  No Recipients    If you would like to receive this communication electronically, please contact [email or web address] or [phone] to request more information on [employer] Link access.    For providers and/or their staff who would like to refer a patient to [facility], please contact us through our one-stop-shop provider referral line, [facility], at [phone].    If you feel you have received this communication in error or would no longer like to receive these types of communications, please e-mail [email or web address]

## 2020-12-23 ENCOUNTER — CLINICAL SUPPORT (OUTPATIENT)
Dept: CARDIOLOGY | Facility: CLINIC | Age: 85
End: 2020-12-23
Payer: MEDICARE

## 2020-12-23 DIAGNOSIS — Z95.0 PRESENCE OF CARDIAC PACEMAKER: ICD-10-CM

## 2020-12-23 PROCEDURE — 93294 CARDIAC DEVICE CHECK - REMOTE: ICD-10-PCS | Mod: HCNC,S$GLB,, | Performed by: INTERNAL MEDICINE

## 2020-12-23 PROCEDURE — 93296 CARDIAC DEVICE CHECK - REMOTE: ICD-10-PCS | Mod: HCNC,S$GLB,, | Performed by: INTERNAL MEDICINE

## 2020-12-23 PROCEDURE — 93294 REM INTERROG EVL PM/LDLS PM: CPT | Mod: HCNC,S$GLB,, | Performed by: INTERNAL MEDICINE

## 2020-12-23 PROCEDURE — 93296 REM INTERROG EVL PM/IDS: CPT | Mod: HCNC,S$GLB,, | Performed by: INTERNAL MEDICINE

## 2021-01-01 ENCOUNTER — OFFICE VISIT (OUTPATIENT)
Dept: CARDIOLOGY | Facility: CLINIC | Age: 86
End: 2021-01-01
Payer: MEDICARE

## 2021-01-01 ENCOUNTER — IMMUNIZATION (OUTPATIENT)
Dept: FAMILY MEDICINE | Facility: CLINIC | Age: 86
End: 2021-01-01
Payer: MEDICARE

## 2021-01-01 VITALS
DIASTOLIC BLOOD PRESSURE: 82 MMHG | HEART RATE: 62 BPM | OXYGEN SATURATION: 98 % | BODY MASS INDEX: 20.58 KG/M2 | SYSTOLIC BLOOD PRESSURE: 138 MMHG | WEIGHT: 135.81 LBS | HEIGHT: 68 IN

## 2021-01-01 DIAGNOSIS — I97.190 AV BLOCK, COMPLETE, POST OP COMPLICATION OF AV NODAL ABLATION: ICD-10-CM

## 2021-01-01 DIAGNOSIS — E78.2 MIXED HYPERLIPIDEMIA: ICD-10-CM

## 2021-01-01 DIAGNOSIS — I44.2 AV BLOCK, COMPLETE, POST OP COMPLICATION OF AV NODAL ABLATION: ICD-10-CM

## 2021-01-01 DIAGNOSIS — I10 ESSENTIAL HYPERTENSION: ICD-10-CM

## 2021-01-01 DIAGNOSIS — Z23 NEED FOR VACCINATION: Primary | ICD-10-CM

## 2021-01-01 DIAGNOSIS — Z95.0 S/P PLACEMENT OF CARDIAC PACEMAKER: Primary | ICD-10-CM

## 2021-01-01 DIAGNOSIS — R00.1 BRADYCARDIA: ICD-10-CM

## 2021-01-01 PROCEDURE — 99999 PR PBB SHADOW E&M-EST. PATIENT-LVL III: CPT | Mod: PBBFAC,HCNC,, | Performed by: INTERNAL MEDICINE

## 2021-01-01 PROCEDURE — 99214 PR OFFICE/OUTPT VISIT, EST, LEVL IV, 30-39 MIN: ICD-10-PCS | Mod: HCNC,S$GLB,, | Performed by: INTERNAL MEDICINE

## 2021-01-01 PROCEDURE — 99999 PR PBB SHADOW E&M-EST. PATIENT-LVL III: ICD-10-PCS | Mod: PBBFAC,HCNC,, | Performed by: INTERNAL MEDICINE

## 2021-01-01 PROCEDURE — 99214 OFFICE O/P EST MOD 30 MIN: CPT | Mod: HCNC,S$GLB,, | Performed by: INTERNAL MEDICINE

## 2021-01-01 PROCEDURE — 0004A COVID-19, MRNA, LNP-S, PF, 30 MCG/0.3 ML DOSE VACCINE: CPT | Mod: HCNC,PBBFAC | Performed by: FAMILY MEDICINE

## 2021-01-06 ENCOUNTER — IMMUNIZATION (OUTPATIENT)
Dept: FAMILY MEDICINE | Facility: CLINIC | Age: 86
End: 2021-01-06
Payer: MEDICARE

## 2021-01-06 DIAGNOSIS — Z23 NEED FOR VACCINATION: ICD-10-CM

## 2021-01-06 PROCEDURE — 91300 COVID-19, MRNA, LNP-S, PF, 30 MCG/0.3 ML DOSE VACCINE: CPT | Mod: PBBFAC,PO

## 2021-01-07 DIAGNOSIS — S82.891D CLOSED FRACTURE OF MALLEOLUS OF RIGHT ANKLE WITH ROUTINE HEALING, SUBSEQUENT ENCOUNTER: Primary | ICD-10-CM

## 2021-01-11 ENCOUNTER — HOSPITAL ENCOUNTER (OUTPATIENT)
Dept: RADIOLOGY | Facility: HOSPITAL | Age: 86
Discharge: HOME | End: 2021-01-11
Attending: ORTHOPAEDIC SURGERY
Payer: MEDICARE

## 2021-01-11 ENCOUNTER — OFFICE VISIT (OUTPATIENT)
Dept: ORTHOPEDICS | Facility: CLINIC | Age: 86
End: 2021-01-11
Payer: MEDICARE

## 2021-01-11 VITALS
HEART RATE: 77 BPM | DIASTOLIC BLOOD PRESSURE: 91 MMHG | SYSTOLIC BLOOD PRESSURE: 144 MMHG | WEIGHT: 130.06 LBS | HEIGHT: 67 IN | BODY MASS INDEX: 20.41 KG/M2

## 2021-01-11 DIAGNOSIS — S82.61XD CLOSED DISP FRACTURE OF RIGHT LATERAL MALLEOLUS WITH ROUTINE HEALING: ICD-10-CM

## 2021-01-11 DIAGNOSIS — S82.891D CLOSED FRACTURE OF MALLEOLUS OF RIGHT ANKLE WITH ROUTINE HEALING, SUBSEQUENT ENCOUNTER: ICD-10-CM

## 2021-01-11 DIAGNOSIS — S92.351A DISPLACED FRACTURE OF FIFTH METATARSAL BONE, RIGHT FOOT, INITIAL ENCOUNTER FOR CLOSED FRACTURE: ICD-10-CM

## 2021-01-11 DIAGNOSIS — M79.671 RIGHT FOOT PAIN: ICD-10-CM

## 2021-01-11 DIAGNOSIS — M79.671 RIGHT FOOT PAIN: Primary | ICD-10-CM

## 2021-01-11 PROCEDURE — 73630 X-RAY EXAM OF FOOT: CPT | Mod: TC,PO,RT

## 2021-01-11 PROCEDURE — 73630 X-RAY EXAM OF FOOT: CPT | Mod: 26,RT,, | Performed by: RADIOLOGY

## 2021-01-11 PROCEDURE — 3288F PR FALLS RISK ASSESSMENT DOCUMENTED: ICD-10-PCS | Mod: CPTII,S$GLB,, | Performed by: ORTHOPAEDIC SURGERY

## 2021-01-11 PROCEDURE — 99024 PR POST-OP FOLLOW-UP VISIT: ICD-10-PCS | Mod: S$GLB,,, | Performed by: ORTHOPAEDIC SURGERY

## 2021-01-11 PROCEDURE — 99999 PR PBB SHADOW E&M-EST. PATIENT-LVL III: ICD-10-PCS | Mod: PBBFAC,,, | Performed by: ORTHOPAEDIC SURGERY

## 2021-01-11 PROCEDURE — 28470 PR CLOSED RX METATARSAL FX: ICD-10-PCS | Mod: 79,RT,S$GLB, | Performed by: ORTHOPAEDIC SURGERY

## 2021-01-11 PROCEDURE — 28470 CLTX METATARSAL FX WO MNP EA: CPT | Mod: 79,RT,S$GLB, | Performed by: ORTHOPAEDIC SURGERY

## 2021-01-11 PROCEDURE — 3288F FALL RISK ASSESSMENT DOCD: CPT | Mod: CPTII,S$GLB,, | Performed by: ORTHOPAEDIC SURGERY

## 2021-01-11 PROCEDURE — 99024 POSTOP FOLLOW-UP VISIT: CPT | Mod: S$GLB,,, | Performed by: ORTHOPAEDIC SURGERY

## 2021-01-11 PROCEDURE — 73610 X-RAY EXAM OF ANKLE: CPT | Mod: 26,RT,, | Performed by: RADIOLOGY

## 2021-01-11 PROCEDURE — 1101F PT FALLS ASSESS-DOCD LE1/YR: CPT | Mod: CPTII,S$GLB,, | Performed by: ORTHOPAEDIC SURGERY

## 2021-01-11 PROCEDURE — 73610 XR ANKLE COMPLETE 3 VIEW RIGHT: ICD-10-PCS | Mod: 26,RT,, | Performed by: RADIOLOGY

## 2021-01-11 PROCEDURE — 73630 XR FOOT COMPLETE 3 VIEW RIGHT: ICD-10-PCS | Mod: 26,RT,, | Performed by: RADIOLOGY

## 2021-01-11 PROCEDURE — 1125F AMNT PAIN NOTED PAIN PRSNT: CPT | Mod: S$GLB,,, | Performed by: ORTHOPAEDIC SURGERY

## 2021-01-11 PROCEDURE — 1101F PR PT FALLS ASSESS DOC 0-1 FALLS W/OUT INJ PAST YR: ICD-10-PCS | Mod: CPTII,S$GLB,, | Performed by: ORTHOPAEDIC SURGERY

## 2021-01-11 PROCEDURE — 73610 X-RAY EXAM OF ANKLE: CPT | Mod: TC,PO,RT

## 2021-01-11 PROCEDURE — 1125F PR PAIN SEVERITY QUANTIFIED, PAIN PRESENT: ICD-10-PCS | Mod: S$GLB,,, | Performed by: ORTHOPAEDIC SURGERY

## 2021-01-11 PROCEDURE — 99999 PR PBB SHADOW E&M-EST. PATIENT-LVL III: CPT | Mod: PBBFAC,,, | Performed by: ORTHOPAEDIC SURGERY

## 2021-01-13 PROBLEM — S82.61XD CLOSED DISP FRACTURE OF RIGHT LATERAL MALLEOLUS WITH ROUTINE HEALING: Status: ACTIVE | Noted: 2021-01-13

## 2021-01-13 PROBLEM — S92.351A DISPLACED FRACTURE OF FIFTH METATARSAL BONE, RIGHT FOOT, INITIAL ENCOUNTER FOR CLOSED FRACTURE: Status: ACTIVE | Noted: 2021-01-13

## 2021-01-20 ENCOUNTER — HOSPITAL ENCOUNTER (OUTPATIENT)
Dept: RADIOLOGY | Facility: HOSPITAL | Age: 86
Discharge: HOME | End: 2021-01-20
Attending: FAMILY MEDICINE
Payer: MEDICARE

## 2021-01-20 DIAGNOSIS — Z12.31 SCREENING MAMMOGRAM, ENCOUNTER FOR: ICD-10-CM

## 2021-01-20 PROCEDURE — 77063 MAMMO DIGITAL SCREENING LEFT WITH TOMO: ICD-10-PCS | Mod: 26,,, | Performed by: RADIOLOGY

## 2021-01-20 PROCEDURE — 77067 SCR MAMMO BI INCL CAD: CPT | Mod: TC,PO

## 2021-01-20 PROCEDURE — 77063 BREAST TOMOSYNTHESIS BI: CPT | Mod: 26,,, | Performed by: RADIOLOGY

## 2021-01-20 PROCEDURE — 77067 MAMMO DIGITAL SCREENING LEFT WITH TOMO: ICD-10-PCS | Mod: 26,,, | Performed by: RADIOLOGY

## 2021-01-20 PROCEDURE — 77067 SCR MAMMO BI INCL CAD: CPT | Mod: 26,,, | Performed by: RADIOLOGY

## 2021-01-22 ENCOUNTER — PATIENT MESSAGE (OUTPATIENT)
Dept: ORTHOPEDICS | Facility: CLINIC | Age: 86
End: 2021-01-22

## 2021-01-23 ENCOUNTER — PATIENT MESSAGE (OUTPATIENT)
Dept: ORTHOPEDICS | Facility: CLINIC | Age: 86
End: 2021-01-23

## 2021-01-25 ENCOUNTER — PATIENT MESSAGE (OUTPATIENT)
Dept: ORTHOPEDICS | Facility: CLINIC | Age: 86
End: 2021-01-25

## 2021-01-27 ENCOUNTER — IMMUNIZATION (OUTPATIENT)
Dept: FAMILY MEDICINE | Facility: CLINIC | Age: 86
End: 2021-01-27
Payer: MEDICARE

## 2021-01-27 DIAGNOSIS — Z23 NEED FOR VACCINATION: Primary | ICD-10-CM

## 2021-01-27 PROCEDURE — 91300 COVID-19, MRNA, LNP-S, PF, 30 MCG/0.3 ML DOSE VACCINE: CPT | Mod: PBBFAC,PO

## 2021-01-27 PROCEDURE — 0002A COVID-19, MRNA, LNP-S, PF, 30 MCG/0.3 ML DOSE VACCINE: CPT | Mod: PBBFAC,PO

## 2021-02-03 DIAGNOSIS — M79.671 RIGHT FOOT PAIN: Primary | ICD-10-CM

## 2021-02-03 DIAGNOSIS — S82.61XD CLOSED DISP FRACTURE OF RIGHT LATERAL MALLEOLUS WITH ROUTINE HEALING: ICD-10-CM

## 2021-02-03 DIAGNOSIS — S92.351A DISPLACED FRACTURE OF FIFTH METATARSAL BONE, RIGHT FOOT, INITIAL ENCOUNTER FOR CLOSED FRACTURE: ICD-10-CM

## 2021-02-04 ENCOUNTER — OFFICE VISIT (OUTPATIENT)
Dept: ORTHOPEDICS | Facility: CLINIC | Age: 86
End: 2021-02-04
Payer: MEDICARE

## 2021-02-04 ENCOUNTER — HOSPITAL ENCOUNTER (OUTPATIENT)
Dept: RADIOLOGY | Facility: HOSPITAL | Age: 86
Discharge: HOME | End: 2021-02-04
Attending: ORTHOPAEDIC SURGERY
Payer: MEDICARE

## 2021-02-04 VITALS
DIASTOLIC BLOOD PRESSURE: 87 MMHG | SYSTOLIC BLOOD PRESSURE: 150 MMHG | WEIGHT: 130.06 LBS | HEART RATE: 72 BPM | BODY MASS INDEX: 20.41 KG/M2 | HEIGHT: 67 IN

## 2021-02-04 DIAGNOSIS — S82.61XD CLOSED DISP FRACTURE OF RIGHT LATERAL MALLEOLUS WITH ROUTINE HEALING: ICD-10-CM

## 2021-02-04 DIAGNOSIS — S92.351A DISPLACED FRACTURE OF FIFTH METATARSAL BONE, RIGHT FOOT, INITIAL ENCOUNTER FOR CLOSED FRACTURE: Primary | ICD-10-CM

## 2021-02-04 DIAGNOSIS — S92.351A DISPLACED FRACTURE OF FIFTH METATARSAL BONE, RIGHT FOOT, INITIAL ENCOUNTER FOR CLOSED FRACTURE: ICD-10-CM

## 2021-02-04 PROCEDURE — 73630 X-RAY EXAM OF FOOT: CPT | Mod: TC,PO,RT

## 2021-02-04 PROCEDURE — 1125F AMNT PAIN NOTED PAIN PRSNT: CPT | Mod: S$GLB,,, | Performed by: ORTHOPAEDIC SURGERY

## 2021-02-04 PROCEDURE — 1101F PT FALLS ASSESS-DOCD LE1/YR: CPT | Mod: CPTII,S$GLB,, | Performed by: ORTHOPAEDIC SURGERY

## 2021-02-04 PROCEDURE — 99024 POSTOP FOLLOW-UP VISIT: CPT | Mod: S$GLB,,, | Performed by: ORTHOPAEDIC SURGERY

## 2021-02-04 PROCEDURE — 73610 XR ANKLE COMPLETE 3 VIEW RIGHT: ICD-10-PCS | Mod: 26,RT,, | Performed by: RADIOLOGY

## 2021-02-04 PROCEDURE — 73630 XR FOOT COMPLETE 3 VIEW RIGHT: ICD-10-PCS | Mod: 26,RT,, | Performed by: RADIOLOGY

## 2021-02-04 PROCEDURE — 99999 PR PBB SHADOW E&M-EST. PATIENT-LVL III: ICD-10-PCS | Mod: PBBFAC,,, | Performed by: ORTHOPAEDIC SURGERY

## 2021-02-04 PROCEDURE — 1101F PR PT FALLS ASSESS DOC 0-1 FALLS W/OUT INJ PAST YR: ICD-10-PCS | Mod: CPTII,S$GLB,, | Performed by: ORTHOPAEDIC SURGERY

## 2021-02-04 PROCEDURE — 99999 PR PBB SHADOW E&M-EST. PATIENT-LVL III: CPT | Mod: PBBFAC,,, | Performed by: ORTHOPAEDIC SURGERY

## 2021-02-04 PROCEDURE — 3288F FALL RISK ASSESSMENT DOCD: CPT | Mod: CPTII,S$GLB,, | Performed by: ORTHOPAEDIC SURGERY

## 2021-02-04 PROCEDURE — 73610 X-RAY EXAM OF ANKLE: CPT | Mod: TC,PO,RT

## 2021-02-04 PROCEDURE — 1125F PR PAIN SEVERITY QUANTIFIED, PAIN PRESENT: ICD-10-PCS | Mod: S$GLB,,, | Performed by: ORTHOPAEDIC SURGERY

## 2021-02-04 PROCEDURE — 3288F PR FALLS RISK ASSESSMENT DOCUMENTED: ICD-10-PCS | Mod: CPTII,S$GLB,, | Performed by: ORTHOPAEDIC SURGERY

## 2021-02-04 PROCEDURE — 99024 PR POST-OP FOLLOW-UP VISIT: ICD-10-PCS | Mod: S$GLB,,, | Performed by: ORTHOPAEDIC SURGERY

## 2021-02-04 PROCEDURE — 73630 X-RAY EXAM OF FOOT: CPT | Mod: 26,RT,, | Performed by: RADIOLOGY

## 2021-02-04 PROCEDURE — 73610 X-RAY EXAM OF ANKLE: CPT | Mod: 26,RT,, | Performed by: RADIOLOGY

## 2021-02-10 ENCOUNTER — CLINICAL SUPPORT (OUTPATIENT)
Dept: CARDIOLOGY | Facility: CLINIC | Age: 86
End: 2021-02-10
Attending: INTERNAL MEDICINE
Payer: MEDICARE

## 2021-02-10 VITALS — WEIGHT: 130 LBS | BODY MASS INDEX: 20.4 KG/M2 | HEIGHT: 67 IN | HEART RATE: 85 BPM

## 2021-02-10 DIAGNOSIS — Z95.0 CARDIAC PACEMAKER IN SITU: ICD-10-CM

## 2021-02-10 DIAGNOSIS — R00.1 BRADYCARDIA: ICD-10-CM

## 2021-02-10 DIAGNOSIS — Z95.0 S/P PLACEMENT OF CARDIAC PACEMAKER: ICD-10-CM

## 2021-02-10 PROCEDURE — 93280 CARDIAC DEVICE CHECK - IN CLINIC & HOSPITAL: ICD-10-PCS | Mod: S$GLB,,, | Performed by: INTERNAL MEDICINE

## 2021-02-10 PROCEDURE — 93306 ECHO (CUPID ONLY): ICD-10-PCS | Mod: S$GLB,,, | Performed by: INTERNAL MEDICINE

## 2021-02-10 PROCEDURE — 99999 PR PBB SHADOW E&M-EST. PATIENT-LVL II: ICD-10-PCS | Mod: PBBFAC,,,

## 2021-02-10 PROCEDURE — 99999 PR PBB SHADOW E&M-EST. PATIENT-LVL II: CPT | Mod: PBBFAC,,,

## 2021-02-10 PROCEDURE — 93306 TTE W/DOPPLER COMPLETE: CPT | Mod: S$GLB,,, | Performed by: INTERNAL MEDICINE

## 2021-02-10 PROCEDURE — 93280 PM DEVICE PROGR EVAL DUAL: CPT | Mod: S$GLB,,, | Performed by: INTERNAL MEDICINE

## 2021-02-11 LAB
ASCENDING AORTA: 3.12 CM
AV INDEX (PROSTH): 0.66
AV MEAN GRADIENT: 7 MMHG
AV PEAK GRADIENT: 12 MMHG
AV VALVE AREA: 2.07 CM2
AV VELOCITY RATIO: 0.64
BSA FOR ECHO PROCEDURE: 1.67 M2
CV ECHO LV RWT: 0.54 CM
DOP CALC AO PEAK VEL: 1.74 M/S
DOP CALC AO VTI: 37.49 CM
DOP CALC LVOT AREA: 3.1 CM2
DOP CALC LVOT DIAMETER: 2 CM
DOP CALC LVOT PEAK VEL: 1.11 M/S
DOP CALC LVOT STROKE VOLUME: 77.59 CM3
DOP CALCLVOT PEAK VEL VTI: 24.71 CM
E WAVE DECELERATION TIME: 133.39 MSEC
E/A RATIO: 0.48
E/E' RATIO: 10.17 M/S
ECHO LV POSTERIOR WALL: 0.98 CM (ref 0.6–1.1)
FRACTIONAL SHORTENING: 33 % (ref 28–44)
INTERVENTRICULAR SEPTUM: 1 CM (ref 0.6–1.1)
IVRT: 194.1 MSEC
LA MAJOR: 5.62 CM
LA MINOR: 5.3 CM
LA WIDTH: 2.34 CM
LEFT ATRIUM SIZE: 2.22 CM
LEFT ATRIUM VOLUME INDEX: 14.3 ML/M2
LEFT ATRIUM VOLUME: 24.09 CM3
LEFT INTERNAL DIMENSION IN SYSTOLE: 2.43 CM (ref 2.1–4)
LEFT VENTRICLE DIASTOLIC VOLUME INDEX: 32.85 ML/M2
LEFT VENTRICLE DIASTOLIC VOLUME: 55.19 ML
LEFT VENTRICLE MASS INDEX: 64 G/M2
LEFT VENTRICLE SYSTOLIC VOLUME INDEX: 12.3 ML/M2
LEFT VENTRICLE SYSTOLIC VOLUME: 20.69 ML
LEFT VENTRICULAR INTERNAL DIMENSION IN DIASTOLE: 3.62 CM (ref 3.5–6)
LEFT VENTRICULAR MASS: 107.24 G
LV LATERAL E/E' RATIO: 8.71 M/S
LV SEPTAL E/E' RATIO: 12.2 M/S
MV A" WAVE DURATION": 18.55 MSEC
MV PEAK A VEL: 1.28 M/S
MV PEAK E VEL: 0.61 M/S
PISA MRMAX VEL: 0.06 M/S
PISA TR MAX VEL: 1.78 M/S
PULM VEIN S/D RATIO: 2.25
PV PEAK D VEL: 0.2 M/S
PV PEAK S VEL: 0.45 M/S
RA MAJOR: 4.64 CM
RA PRESSURE: 3 MMHG
RA WIDTH: 2.38 CM
RIGHT VENTRICULAR END-DIASTOLIC DIMENSION: 2.11 CM
RV TISSUE DOPPLER FREE WALL SYSTOLIC VELOCITY 1 (APICAL 4 CHAMBER VIEW): 12.83 CM/S
SINUS: 3.5 CM
STJ: 2.51 CM
TDI LATERAL: 0.07 M/S
TDI SEPTAL: 0.05 M/S
TDI: 0.06 M/S
TR MAX PG: 13 MMHG
TRICUSPID ANNULAR PLANE SYSTOLIC EXCURSION: 1.99 CM
TV REST PULMONARY ARTERY PRESSURE: 16 MMHG

## 2021-02-22 ENCOUNTER — OFFICE VISIT (OUTPATIENT)
Dept: CARDIOLOGY | Facility: CLINIC | Age: 86
End: 2021-02-22
Attending: INTERNAL MEDICINE
Payer: MEDICARE

## 2021-02-22 VITALS
HEART RATE: 80 BPM | WEIGHT: 132.69 LBS | HEIGHT: 68 IN | SYSTOLIC BLOOD PRESSURE: 136 MMHG | DIASTOLIC BLOOD PRESSURE: 81 MMHG | BODY MASS INDEX: 20.11 KG/M2

## 2021-02-22 DIAGNOSIS — I10 ESSENTIAL HYPERTENSION: ICD-10-CM

## 2021-02-22 DIAGNOSIS — E78.2 MIXED HYPERLIPIDEMIA: ICD-10-CM

## 2021-02-22 DIAGNOSIS — R00.1 BRADYCARDIA: Primary | ICD-10-CM

## 2021-02-22 DIAGNOSIS — Z95.0 S/P PLACEMENT OF CARDIAC PACEMAKER: ICD-10-CM

## 2021-02-22 PROCEDURE — 99214 OFFICE O/P EST MOD 30 MIN: CPT | Mod: S$GLB,,, | Performed by: INTERNAL MEDICINE

## 2021-02-22 PROCEDURE — 3079F PR MOST RECENT DIASTOLIC BLOOD PRESSURE 80-89 MM HG: ICD-10-PCS | Mod: CPTII,S$GLB,, | Performed by: INTERNAL MEDICINE

## 2021-02-22 PROCEDURE — 3288F PR FALLS RISK ASSESSMENT DOCUMENTED: ICD-10-PCS | Mod: CPTII,S$GLB,, | Performed by: INTERNAL MEDICINE

## 2021-02-22 PROCEDURE — 1126F AMNT PAIN NOTED NONE PRSNT: CPT | Mod: S$GLB,,, | Performed by: INTERNAL MEDICINE

## 2021-02-22 PROCEDURE — 99999 PR PBB SHADOW E&M-EST. PATIENT-LVL III: CPT | Mod: PBBFAC,,, | Performed by: INTERNAL MEDICINE

## 2021-02-22 PROCEDURE — 1159F MED LIST DOCD IN RCRD: CPT | Mod: S$GLB,,, | Performed by: INTERNAL MEDICINE

## 2021-02-22 PROCEDURE — 1100F PTFALLS ASSESS-DOCD GE2>/YR: CPT | Mod: CPTII,S$GLB,, | Performed by: INTERNAL MEDICINE

## 2021-02-22 PROCEDURE — 3079F DIAST BP 80-89 MM HG: CPT | Mod: CPTII,S$GLB,, | Performed by: INTERNAL MEDICINE

## 2021-02-22 PROCEDURE — 1126F PR PAIN SEVERITY QUANTIFIED, NO PAIN PRESENT: ICD-10-PCS | Mod: S$GLB,,, | Performed by: INTERNAL MEDICINE

## 2021-02-22 PROCEDURE — 3288F FALL RISK ASSESSMENT DOCD: CPT | Mod: CPTII,S$GLB,, | Performed by: INTERNAL MEDICINE

## 2021-02-22 PROCEDURE — 3075F SYST BP GE 130 - 139MM HG: CPT | Mod: CPTII,S$GLB,, | Performed by: INTERNAL MEDICINE

## 2021-02-22 PROCEDURE — 1100F PR PT FALLS ASSESS DOC 2+ FALLS/FALL W/INJURY/YR: ICD-10-PCS | Mod: CPTII,S$GLB,, | Performed by: INTERNAL MEDICINE

## 2021-02-22 PROCEDURE — 99999 PR PBB SHADOW E&M-EST. PATIENT-LVL III: ICD-10-PCS | Mod: PBBFAC,,, | Performed by: INTERNAL MEDICINE

## 2021-02-22 PROCEDURE — 1159F PR MEDICATION LIST DOCUMENTED IN MEDICAL RECORD: ICD-10-PCS | Mod: S$GLB,,, | Performed by: INTERNAL MEDICINE

## 2021-02-22 PROCEDURE — 99214 PR OFFICE/OUTPT VISIT, EST, LEVL IV, 30-39 MIN: ICD-10-PCS | Mod: S$GLB,,, | Performed by: INTERNAL MEDICINE

## 2021-02-22 PROCEDURE — 3075F PR MOST RECENT SYSTOLIC BLOOD PRESS GE 130-139MM HG: ICD-10-PCS | Mod: CPTII,S$GLB,, | Performed by: INTERNAL MEDICINE

## 2021-02-24 ENCOUNTER — TELEPHONE (OUTPATIENT)
Dept: FAMILY MEDICINE | Facility: CLINIC | Age: 86
End: 2021-02-24

## 2021-03-02 ENCOUNTER — OFFICE VISIT (OUTPATIENT)
Dept: FAMILY MEDICINE | Facility: CLINIC | Age: 86
End: 2021-03-02
Payer: MEDICARE

## 2021-03-02 VITALS
BODY MASS INDEX: 19.91 KG/M2 | HEIGHT: 68 IN | DIASTOLIC BLOOD PRESSURE: 84 MMHG | SYSTOLIC BLOOD PRESSURE: 130 MMHG | HEART RATE: 90 BPM | WEIGHT: 131.38 LBS

## 2021-03-02 DIAGNOSIS — I97.190 AV BLOCK, COMPLETE, POST OP COMPLICATION OF AV NODAL ABLATION: ICD-10-CM

## 2021-03-02 DIAGNOSIS — Z00.00 ENCOUNTER FOR PREVENTIVE HEALTH EXAMINATION: Primary | ICD-10-CM

## 2021-03-02 DIAGNOSIS — I10 ESSENTIAL HYPERTENSION: ICD-10-CM

## 2021-03-02 DIAGNOSIS — I44.2 AV BLOCK, COMPLETE, POST OP COMPLICATION OF AV NODAL ABLATION: ICD-10-CM

## 2021-03-02 PROCEDURE — 1126F AMNT PAIN NOTED NONE PRSNT: CPT | Mod: S$GLB,,, | Performed by: NURSE PRACTITIONER

## 2021-03-02 PROCEDURE — 3288F FALL RISK ASSESSMENT DOCD: CPT | Mod: CPTII,S$GLB,, | Performed by: NURSE PRACTITIONER

## 2021-03-02 PROCEDURE — 99499 UNLISTED E&M SERVICE: CPT | Mod: S$GLB,,, | Performed by: NURSE PRACTITIONER

## 2021-03-02 PROCEDURE — 1100F PTFALLS ASSESS-DOCD GE2>/YR: CPT | Mod: CPTII,S$GLB,, | Performed by: NURSE PRACTITIONER

## 2021-03-02 PROCEDURE — 3079F PR MOST RECENT DIASTOLIC BLOOD PRESSURE 80-89 MM HG: ICD-10-PCS | Mod: CPTII,S$GLB,, | Performed by: NURSE PRACTITIONER

## 2021-03-02 PROCEDURE — 99999 PR PBB SHADOW E&M-EST. PATIENT-LVL IV: ICD-10-PCS | Mod: PBBFAC,,, | Performed by: NURSE PRACTITIONER

## 2021-03-02 PROCEDURE — 99999 PR PBB SHADOW E&M-EST. PATIENT-LVL IV: CPT | Mod: PBBFAC,,, | Performed by: NURSE PRACTITIONER

## 2021-03-02 PROCEDURE — 1158F ADVNC CARE PLAN TLK DOCD: CPT | Mod: S$GLB,,, | Performed by: NURSE PRACTITIONER

## 2021-03-02 PROCEDURE — 3075F PR MOST RECENT SYSTOLIC BLOOD PRESS GE 130-139MM HG: ICD-10-PCS | Mod: CPTII,S$GLB,, | Performed by: NURSE PRACTITIONER

## 2021-03-02 PROCEDURE — 1158F PR ADVANCE CARE PLANNING DISCUSS DOCUMENTED IN MEDICAL RECORD: ICD-10-PCS | Mod: S$GLB,,, | Performed by: NURSE PRACTITIONER

## 2021-03-02 PROCEDURE — G0439 PR MEDICARE ANNUAL WELLNESS SUBSEQUENT VISIT: ICD-10-PCS | Mod: S$GLB,,, | Performed by: NURSE PRACTITIONER

## 2021-03-02 PROCEDURE — G0439 PPPS, SUBSEQ VISIT: HCPCS | Mod: S$GLB,,, | Performed by: NURSE PRACTITIONER

## 2021-03-02 PROCEDURE — 3288F PR FALLS RISK ASSESSMENT DOCUMENTED: ICD-10-PCS | Mod: CPTII,S$GLB,, | Performed by: NURSE PRACTITIONER

## 2021-03-02 PROCEDURE — 99499 RISK ADDL DX/OHS AUDIT: ICD-10-PCS | Mod: S$GLB,,, | Performed by: NURSE PRACTITIONER

## 2021-03-02 PROCEDURE — 3079F DIAST BP 80-89 MM HG: CPT | Mod: CPTII,S$GLB,, | Performed by: NURSE PRACTITIONER

## 2021-03-02 PROCEDURE — 3075F SYST BP GE 130 - 139MM HG: CPT | Mod: CPTII,S$GLB,, | Performed by: NURSE PRACTITIONER

## 2021-03-02 PROCEDURE — 1126F PR PAIN SEVERITY QUANTIFIED, NO PAIN PRESENT: ICD-10-PCS | Mod: S$GLB,,, | Performed by: NURSE PRACTITIONER

## 2021-03-02 PROCEDURE — 1100F PR PT FALLS ASSESS DOC 2+ FALLS/FALL W/INJURY/YR: ICD-10-PCS | Mod: CPTII,S$GLB,, | Performed by: NURSE PRACTITIONER

## 2021-03-02 RX ORDER — AMLODIPINE BESYLATE 5 MG/1
5 TABLET ORAL DAILY
COMMUNITY
End: 2022-01-01

## 2021-03-22 ENCOUNTER — PATIENT MESSAGE (OUTPATIENT)
Dept: FAMILY MEDICINE | Facility: CLINIC | Age: 86
End: 2021-03-22

## 2021-04-09 ENCOUNTER — LAB VISIT (OUTPATIENT)
Dept: LAB | Facility: HOSPITAL | Age: 86
End: 2021-04-09
Attending: FAMILY MEDICINE
Payer: MEDICARE

## 2021-04-09 ENCOUNTER — OFFICE VISIT (OUTPATIENT)
Dept: FAMILY MEDICINE | Facility: CLINIC | Age: 86
End: 2021-04-09
Payer: MEDICARE

## 2021-04-09 VITALS
SYSTOLIC BLOOD PRESSURE: 130 MMHG | HEART RATE: 65 BPM | DIASTOLIC BLOOD PRESSURE: 70 MMHG | TEMPERATURE: 98 F | BODY MASS INDEX: 20.15 KG/M2 | WEIGHT: 132.94 LBS | HEIGHT: 68 IN

## 2021-04-09 DIAGNOSIS — R41.3 MEMORY LOSS: Primary | ICD-10-CM

## 2021-04-09 DIAGNOSIS — J30.9 ALLERGIC RHINITIS, UNSPECIFIED SEASONALITY, UNSPECIFIED TRIGGER: ICD-10-CM

## 2021-04-09 DIAGNOSIS — R41.3 MEMORY LOSS: ICD-10-CM

## 2021-04-09 LAB
ALBUMIN SERPL BCP-MCNC: 4 G/DL (ref 3.5–5.2)
ALP SERPL-CCNC: 69 U/L (ref 55–135)
ALT SERPL W/O P-5'-P-CCNC: 16 U/L (ref 10–44)
ANION GAP SERPL CALC-SCNC: 9 MMOL/L (ref 8–16)
AST SERPL-CCNC: 35 U/L (ref 10–40)
BILIRUB SERPL-MCNC: 0.5 MG/DL (ref 0.1–1)
BUN SERPL-MCNC: 13 MG/DL (ref 8–23)
CALCIUM SERPL-MCNC: 10 MG/DL (ref 8.7–10.5)
CHLORIDE SERPL-SCNC: 104 MMOL/L (ref 95–110)
CO2 SERPL-SCNC: 27 MMOL/L (ref 23–29)
CREAT SERPL-MCNC: 0.8 MG/DL (ref 0.5–1.4)
CRP SERPL-MCNC: 0.9 MG/L (ref 0–8.2)
ERYTHROCYTE [SEDIMENTATION RATE] IN BLOOD BY WESTERGREN METHOD: 12 MM/HR (ref 0–36)
EST. GFR  (AFRICAN AMERICAN): >60 ML/MIN/1.73 M^2
EST. GFR  (NON AFRICAN AMERICAN): >60 ML/MIN/1.73 M^2
GLUCOSE SERPL-MCNC: 92 MG/DL (ref 70–110)
POTASSIUM SERPL-SCNC: 4 MMOL/L (ref 3.5–5.1)
PROT SERPL-MCNC: 7.8 G/DL (ref 6–8.4)
SODIUM SERPL-SCNC: 140 MMOL/L (ref 136–145)
T3FREE SERPL-MCNC: 1.8 PG/ML (ref 2.3–4.2)
T4 FREE SERPL-MCNC: 0.89 NG/DL (ref 0.71–1.51)
TSH SERPL DL<=0.005 MIU/L-ACNC: 0.93 UIU/ML (ref 0.4–4)
VIT B12 SERPL-MCNC: 508 PG/ML (ref 210–950)

## 2021-04-09 PROCEDURE — 86140 C-REACTIVE PROTEIN: CPT | Performed by: FAMILY MEDICINE

## 2021-04-09 PROCEDURE — 84439 ASSAY OF FREE THYROXINE: CPT | Performed by: FAMILY MEDICINE

## 2021-04-09 PROCEDURE — 1101F PT FALLS ASSESS-DOCD LE1/YR: CPT | Mod: CPTII,S$GLB,, | Performed by: FAMILY MEDICINE

## 2021-04-09 PROCEDURE — 3288F PR FALLS RISK ASSESSMENT DOCUMENTED: ICD-10-PCS | Mod: CPTII,S$GLB,, | Performed by: FAMILY MEDICINE

## 2021-04-09 PROCEDURE — 99999 PR PBB SHADOW E&M-EST. PATIENT-LVL IV: CPT | Mod: PBBFAC,,, | Performed by: FAMILY MEDICINE

## 2021-04-09 PROCEDURE — 36415 COLL VENOUS BLD VENIPUNCTURE: CPT | Mod: PN | Performed by: FAMILY MEDICINE

## 2021-04-09 PROCEDURE — 82607 VITAMIN B-12: CPT | Performed by: FAMILY MEDICINE

## 2021-04-09 PROCEDURE — 1159F MED LIST DOCD IN RCRD: CPT | Mod: S$GLB,,, | Performed by: FAMILY MEDICINE

## 2021-04-09 PROCEDURE — 84443 ASSAY THYROID STIM HORMONE: CPT | Performed by: FAMILY MEDICINE

## 2021-04-09 PROCEDURE — 3288F FALL RISK ASSESSMENT DOCD: CPT | Mod: CPTII,S$GLB,, | Performed by: FAMILY MEDICINE

## 2021-04-09 PROCEDURE — 1159F PR MEDICATION LIST DOCUMENTED IN MEDICAL RECORD: ICD-10-PCS | Mod: S$GLB,,, | Performed by: FAMILY MEDICINE

## 2021-04-09 PROCEDURE — 1126F PR PAIN SEVERITY QUANTIFIED, NO PAIN PRESENT: ICD-10-PCS | Mod: S$GLB,,, | Performed by: FAMILY MEDICINE

## 2021-04-09 PROCEDURE — 1126F AMNT PAIN NOTED NONE PRSNT: CPT | Mod: S$GLB,,, | Performed by: FAMILY MEDICINE

## 2021-04-09 PROCEDURE — 1101F PR PT FALLS ASSESS DOC 0-1 FALLS W/OUT INJ PAST YR: ICD-10-PCS | Mod: CPTII,S$GLB,, | Performed by: FAMILY MEDICINE

## 2021-04-09 PROCEDURE — 86592 SYPHILIS TEST NON-TREP QUAL: CPT | Performed by: FAMILY MEDICINE

## 2021-04-09 PROCEDURE — 99214 PR OFFICE/OUTPT VISIT, EST, LEVL IV, 30-39 MIN: ICD-10-PCS | Mod: S$GLB,,, | Performed by: FAMILY MEDICINE

## 2021-04-09 PROCEDURE — 99214 OFFICE O/P EST MOD 30 MIN: CPT | Mod: S$GLB,,, | Performed by: FAMILY MEDICINE

## 2021-04-09 PROCEDURE — 84481 FREE ASSAY (FT-3): CPT | Performed by: FAMILY MEDICINE

## 2021-04-09 PROCEDURE — 85652 RBC SED RATE AUTOMATED: CPT | Performed by: FAMILY MEDICINE

## 2021-04-09 PROCEDURE — 80053 COMPREHEN METABOLIC PANEL: CPT | Performed by: FAMILY MEDICINE

## 2021-04-09 PROCEDURE — 99999 PR PBB SHADOW E&M-EST. PATIENT-LVL IV: ICD-10-PCS | Mod: PBBFAC,,, | Performed by: FAMILY MEDICINE

## 2021-04-09 RX ORDER — AZELASTINE 1 MG/ML
1 SPRAY, METERED NASAL 2 TIMES DAILY
Qty: 30 ML | Refills: 2 | Status: SHIPPED | OUTPATIENT
Start: 2021-04-09 | End: 2022-01-01

## 2021-04-10 LAB — RPR SER QL: NORMAL

## 2021-04-14 ENCOUNTER — HOSPITAL ENCOUNTER (OUTPATIENT)
Dept: RADIOLOGY | Facility: HOSPITAL | Age: 86
Discharge: HOME | End: 2021-04-14
Attending: FAMILY MEDICINE
Payer: MEDICARE

## 2021-04-14 DIAGNOSIS — R41.3 MEMORY LOSS: ICD-10-CM

## 2021-04-14 PROCEDURE — 70450 CT HEAD/BRAIN W/O DYE: CPT | Mod: 26,,, | Performed by: RADIOLOGY

## 2021-04-14 PROCEDURE — 70450 CT HEAD WITHOUT CONTRAST: ICD-10-PCS | Mod: 26,,, | Performed by: RADIOLOGY

## 2021-04-14 PROCEDURE — 70450 CT HEAD/BRAIN W/O DYE: CPT | Mod: TC,PO

## 2021-04-25 ENCOUNTER — CLINICAL SUPPORT (OUTPATIENT)
Dept: CARDIOLOGY | Facility: CLINIC | Age: 86
End: 2021-04-25
Payer: MEDICARE

## 2021-04-25 DIAGNOSIS — Z95.0 PRESENCE OF CARDIAC PACEMAKER: ICD-10-CM

## 2021-04-25 PROCEDURE — 93296 CARDIAC DEVICE CHECK - REMOTE: ICD-10-PCS | Mod: HCNC,S$GLB,, | Performed by: INTERNAL MEDICINE

## 2021-04-25 PROCEDURE — 93296 REM INTERROG EVL PM/IDS: CPT | Mod: HCNC,S$GLB,, | Performed by: INTERNAL MEDICINE

## 2021-04-25 PROCEDURE — 93294 REM INTERROG EVL PM/LDLS PM: CPT | Mod: HCNC,S$GLB,, | Performed by: INTERNAL MEDICINE

## 2021-04-25 PROCEDURE — 93294 CARDIAC DEVICE CHECK - REMOTE: ICD-10-PCS | Mod: HCNC,S$GLB,, | Performed by: INTERNAL MEDICINE

## 2021-06-01 ENCOUNTER — OFFICE VISIT (OUTPATIENT)
Dept: FAMILY MEDICINE | Facility: CLINIC | Age: 86
End: 2021-06-01
Payer: MEDICARE

## 2021-06-01 VITALS
SYSTOLIC BLOOD PRESSURE: 150 MMHG | HEIGHT: 68 IN | TEMPERATURE: 98 F | BODY MASS INDEX: 20.23 KG/M2 | DIASTOLIC BLOOD PRESSURE: 96 MMHG | WEIGHT: 133.5 LBS | HEART RATE: 67 BPM | OXYGEN SATURATION: 95 %

## 2021-06-01 DIAGNOSIS — R41.3 MEMORY LOSS: Primary | ICD-10-CM

## 2021-06-01 PROCEDURE — 1101F PR PT FALLS ASSESS DOC 0-1 FALLS W/OUT INJ PAST YR: ICD-10-PCS | Mod: CPTII,S$GLB,, | Performed by: FAMILY MEDICINE

## 2021-06-01 PROCEDURE — 1159F MED LIST DOCD IN RCRD: CPT | Mod: S$GLB,,, | Performed by: FAMILY MEDICINE

## 2021-06-01 PROCEDURE — 99999 PR PBB SHADOW E&M-EST. PATIENT-LVL IV: CPT | Mod: PBBFAC,,, | Performed by: FAMILY MEDICINE

## 2021-06-01 PROCEDURE — 3288F FALL RISK ASSESSMENT DOCD: CPT | Mod: CPTII,S$GLB,, | Performed by: FAMILY MEDICINE

## 2021-06-01 PROCEDURE — 99214 OFFICE O/P EST MOD 30 MIN: CPT | Mod: S$GLB,,, | Performed by: FAMILY MEDICINE

## 2021-06-01 PROCEDURE — 1159F PR MEDICATION LIST DOCUMENTED IN MEDICAL RECORD: ICD-10-PCS | Mod: S$GLB,,, | Performed by: FAMILY MEDICINE

## 2021-06-01 PROCEDURE — 1126F AMNT PAIN NOTED NONE PRSNT: CPT | Mod: S$GLB,,, | Performed by: FAMILY MEDICINE

## 2021-06-01 PROCEDURE — 1101F PT FALLS ASSESS-DOCD LE1/YR: CPT | Mod: CPTII,S$GLB,, | Performed by: FAMILY MEDICINE

## 2021-06-01 PROCEDURE — 1126F PR PAIN SEVERITY QUANTIFIED, NO PAIN PRESENT: ICD-10-PCS | Mod: S$GLB,,, | Performed by: FAMILY MEDICINE

## 2021-06-01 PROCEDURE — 3288F PR FALLS RISK ASSESSMENT DOCUMENTED: ICD-10-PCS | Mod: CPTII,S$GLB,, | Performed by: FAMILY MEDICINE

## 2021-06-01 PROCEDURE — 99214 PR OFFICE/OUTPT VISIT, EST, LEVL IV, 30-39 MIN: ICD-10-PCS | Mod: S$GLB,,, | Performed by: FAMILY MEDICINE

## 2021-06-01 PROCEDURE — 99999 PR PBB SHADOW E&M-EST. PATIENT-LVL IV: ICD-10-PCS | Mod: PBBFAC,,, | Performed by: FAMILY MEDICINE

## 2021-07-21 ENCOUNTER — PATIENT MESSAGE (OUTPATIENT)
Dept: FAMILY MEDICINE | Facility: CLINIC | Age: 86
End: 2021-07-21

## 2021-07-21 DIAGNOSIS — R41.3 MEMORY LOSS: Primary | ICD-10-CM

## 2021-07-22 ENCOUNTER — TELEPHONE (OUTPATIENT)
Dept: NEUROLOGY | Facility: CLINIC | Age: 86
End: 2021-07-22

## 2021-07-24 ENCOUNTER — CLINICAL SUPPORT (OUTPATIENT)
Dept: CARDIOLOGY | Facility: HOSPITAL | Age: 86
End: 2021-07-24
Payer: MEDICARE

## 2021-07-24 DIAGNOSIS — Z95.0 PRESENCE OF CARDIAC PACEMAKER: ICD-10-CM

## 2021-07-24 PROCEDURE — 93294 REM INTERROG EVL PM/LDLS PM: CPT | Mod: ,,, | Performed by: INTERNAL MEDICINE

## 2021-07-24 PROCEDURE — 93294 CARDIAC DEVICE CHECK - REMOTE: ICD-10-PCS | Mod: ,,, | Performed by: INTERNAL MEDICINE

## 2021-07-24 PROCEDURE — 93296 REM INTERROG EVL PM/IDS: CPT | Mod: PO | Performed by: INTERNAL MEDICINE

## 2021-08-29 ENCOUNTER — PATIENT OUTREACH (OUTPATIENT)
Dept: ADMINISTRATIVE | Facility: OTHER | Age: 86
End: 2021-08-29

## 2021-10-24 ENCOUNTER — CLINICAL SUPPORT (OUTPATIENT)
Dept: CARDIOLOGY | Facility: HOSPITAL | Age: 86
End: 2021-10-24
Payer: MEDICARE

## 2021-10-24 DIAGNOSIS — Z95.0 PRESENCE OF CARDIAC PACEMAKER: ICD-10-CM

## 2021-10-24 PROCEDURE — 93296 REM INTERROG EVL PM/IDS: CPT | Mod: HCNC,PO | Performed by: INTERNAL MEDICINE

## 2021-10-24 PROCEDURE — 93294 CARDIAC DEVICE CHECK - REMOTE: ICD-10-PCS | Mod: HCNC,,, | Performed by: INTERNAL MEDICINE

## 2021-10-24 PROCEDURE — 93294 REM INTERROG EVL PM/LDLS PM: CPT | Mod: HCNC,,, | Performed by: INTERNAL MEDICINE

## 2021-10-25 ENCOUNTER — OFFICE VISIT (OUTPATIENT)
Dept: NEUROLOGY | Facility: CLINIC | Age: 86
End: 2021-10-25
Payer: MEDICARE

## 2021-10-25 VITALS
BODY MASS INDEX: 19.76 KG/M2 | HEIGHT: 68 IN | HEART RATE: 76 BPM | SYSTOLIC BLOOD PRESSURE: 121 MMHG | DIASTOLIC BLOOD PRESSURE: 79 MMHG | WEIGHT: 130.38 LBS

## 2021-10-25 DIAGNOSIS — R41.3 MEMORY LOSS: ICD-10-CM

## 2021-10-25 PROCEDURE — 99999 PR PBB SHADOW E&M-EST. PATIENT-LVL IV: ICD-10-PCS | Mod: PBBFAC,HCNC,, | Performed by: NURSE PRACTITIONER

## 2021-10-25 PROCEDURE — 3078F PR MOST RECENT DIASTOLIC BLOOD PRESSURE < 80 MM HG: ICD-10-PCS | Mod: HCNC,CPTII,S$GLB, | Performed by: NURSE PRACTITIONER

## 2021-10-25 PROCEDURE — 99205 OFFICE O/P NEW HI 60 MIN: CPT | Mod: HCNC,S$GLB,, | Performed by: NURSE PRACTITIONER

## 2021-10-25 PROCEDURE — 1101F PT FALLS ASSESS-DOCD LE1/YR: CPT | Mod: HCNC,CPTII,S$GLB, | Performed by: NURSE PRACTITIONER

## 2021-10-25 PROCEDURE — 1160F PR REVIEW ALL MEDS BY PRESCRIBER/CLIN PHARMACIST DOCUMENTED: ICD-10-PCS | Mod: HCNC,CPTII,S$GLB, | Performed by: NURSE PRACTITIONER

## 2021-10-25 PROCEDURE — 1159F PR MEDICATION LIST DOCUMENTED IN MEDICAL RECORD: ICD-10-PCS | Mod: HCNC,CPTII,S$GLB, | Performed by: NURSE PRACTITIONER

## 2021-10-25 PROCEDURE — 3074F SYST BP LT 130 MM HG: CPT | Mod: HCNC,CPTII,S$GLB, | Performed by: NURSE PRACTITIONER

## 2021-10-25 PROCEDURE — 3288F PR FALLS RISK ASSESSMENT DOCUMENTED: ICD-10-PCS | Mod: HCNC,CPTII,S$GLB, | Performed by: NURSE PRACTITIONER

## 2021-10-25 PROCEDURE — 1101F PR PT FALLS ASSESS DOC 0-1 FALLS W/OUT INJ PAST YR: ICD-10-PCS | Mod: HCNC,CPTII,S$GLB, | Performed by: NURSE PRACTITIONER

## 2021-10-25 PROCEDURE — 99499 RISK ADDL DX/OHS AUDIT: ICD-10-PCS | Mod: HCNC,S$GLB,, | Performed by: NURSE PRACTITIONER

## 2021-10-25 PROCEDURE — 3288F FALL RISK ASSESSMENT DOCD: CPT | Mod: HCNC,CPTII,S$GLB, | Performed by: NURSE PRACTITIONER

## 2021-10-25 PROCEDURE — 99999 PR PBB SHADOW E&M-EST. PATIENT-LVL IV: CPT | Mod: PBBFAC,HCNC,, | Performed by: NURSE PRACTITIONER

## 2021-10-25 PROCEDURE — 3074F PR MOST RECENT SYSTOLIC BLOOD PRESSURE < 130 MM HG: ICD-10-PCS | Mod: HCNC,CPTII,S$GLB, | Performed by: NURSE PRACTITIONER

## 2021-10-25 PROCEDURE — 1126F AMNT PAIN NOTED NONE PRSNT: CPT | Mod: HCNC,CPTII,S$GLB, | Performed by: NURSE PRACTITIONER

## 2021-10-25 PROCEDURE — 1160F RVW MEDS BY RX/DR IN RCRD: CPT | Mod: HCNC,CPTII,S$GLB, | Performed by: NURSE PRACTITIONER

## 2021-10-25 PROCEDURE — 1126F PR PAIN SEVERITY QUANTIFIED, NO PAIN PRESENT: ICD-10-PCS | Mod: HCNC,CPTII,S$GLB, | Performed by: NURSE PRACTITIONER

## 2021-10-25 PROCEDURE — 99205 PR OFFICE/OUTPT VISIT, NEW, LEVL V, 60-74 MIN: ICD-10-PCS | Mod: HCNC,S$GLB,, | Performed by: NURSE PRACTITIONER

## 2021-10-25 PROCEDURE — 1159F MED LIST DOCD IN RCRD: CPT | Mod: HCNC,CPTII,S$GLB, | Performed by: NURSE PRACTITIONER

## 2021-10-25 PROCEDURE — 99499 UNLISTED E&M SERVICE: CPT | Mod: HCNC,S$GLB,, | Performed by: NURSE PRACTITIONER

## 2021-10-25 PROCEDURE — 3078F DIAST BP <80 MM HG: CPT | Mod: HCNC,CPTII,S$GLB, | Performed by: NURSE PRACTITIONER

## 2021-11-17 ENCOUNTER — PATIENT OUTREACH (OUTPATIENT)
Dept: ADMINISTRATIVE | Facility: OTHER | Age: 86
End: 2021-11-17
Payer: MEDICARE

## 2022-01-01 ENCOUNTER — TELEPHONE (OUTPATIENT)
Dept: NEUROLOGY | Facility: CLINIC | Age: 87
End: 2022-01-01
Payer: MEDICARE

## 2022-01-01 ENCOUNTER — OFFICE VISIT (OUTPATIENT)
Dept: FAMILY MEDICINE | Facility: CLINIC | Age: 87
End: 2022-01-01
Payer: MEDICARE

## 2022-01-01 ENCOUNTER — TELEPHONE (OUTPATIENT)
Dept: FAMILY MEDICINE | Facility: CLINIC | Age: 87
End: 2022-01-01

## 2022-01-01 ENCOUNTER — NURSE TRIAGE (OUTPATIENT)
Dept: ADMINISTRATIVE | Facility: CLINIC | Age: 87
End: 2022-01-01
Payer: MEDICARE

## 2022-01-01 ENCOUNTER — PATIENT MESSAGE (OUTPATIENT)
Dept: FAMILY MEDICINE | Facility: CLINIC | Age: 87
End: 2022-01-01

## 2022-01-01 ENCOUNTER — CLINICAL SUPPORT (OUTPATIENT)
Dept: CARDIOLOGY | Facility: HOSPITAL | Age: 87
End: 2022-01-01
Payer: MEDICARE

## 2022-01-01 ENCOUNTER — DOCUMENT SCAN (OUTPATIENT)
Dept: HOME HEALTH SERVICES | Facility: HOSPITAL | Age: 87
End: 2022-01-01
Payer: MEDICARE

## 2022-01-01 ENCOUNTER — HOSPITAL ENCOUNTER (OUTPATIENT)
Dept: RADIOLOGY | Facility: HOSPITAL | Age: 87
Discharge: HOME | End: 2022-03-22
Attending: FAMILY MEDICINE
Payer: MEDICARE

## 2022-01-01 ENCOUNTER — PATIENT MESSAGE (OUTPATIENT)
Dept: NEUROLOGY | Facility: CLINIC | Age: 87
End: 2022-01-01
Payer: MEDICARE

## 2022-01-01 ENCOUNTER — PATIENT MESSAGE (OUTPATIENT)
Dept: FAMILY MEDICINE | Facility: CLINIC | Age: 87
End: 2022-01-01
Payer: MEDICARE

## 2022-01-01 ENCOUNTER — EXTERNAL HOME HEALTH (OUTPATIENT)
Dept: HOME HEALTH SERVICES | Facility: HOSPITAL | Age: 87
End: 2022-01-01
Payer: MEDICARE

## 2022-01-01 ENCOUNTER — TELEPHONE (OUTPATIENT)
Dept: FAMILY MEDICINE | Facility: CLINIC | Age: 87
End: 2022-01-01
Payer: MEDICARE

## 2022-01-01 ENCOUNTER — CLINICAL SUPPORT (OUTPATIENT)
Dept: CARDIOLOGY | Facility: HOSPITAL | Age: 87
End: 2022-01-01
Attending: PSYCHIATRY & NEUROLOGY
Payer: MEDICARE

## 2022-01-01 ENCOUNTER — HOSPITAL ENCOUNTER (OUTPATIENT)
Dept: RADIOLOGY | Facility: HOSPITAL | Age: 87
Discharge: HOME | End: 2022-05-19
Attending: FAMILY MEDICINE
Payer: MEDICARE

## 2022-01-01 ENCOUNTER — PATIENT MESSAGE (OUTPATIENT)
Dept: SMOKING CESSATION | Facility: CLINIC | Age: 87
End: 2022-01-01
Payer: MEDICARE

## 2022-01-01 ENCOUNTER — PATIENT MESSAGE (OUTPATIENT)
Dept: ADMINISTRATIVE | Facility: HOSPITAL | Age: 87
End: 2022-01-01
Payer: MEDICARE

## 2022-01-01 ENCOUNTER — PATIENT MESSAGE (OUTPATIENT)
Dept: PSYCHIATRY | Facility: CLINIC | Age: 87
End: 2022-01-01
Payer: MEDICARE

## 2022-01-01 ENCOUNTER — PATIENT OUTREACH (OUTPATIENT)
Dept: ADMINISTRATIVE | Facility: HOSPITAL | Age: 87
End: 2022-01-01
Payer: MEDICARE

## 2022-01-01 ENCOUNTER — HOSPITAL ENCOUNTER (OUTPATIENT)
Dept: RADIOLOGY | Facility: HOSPITAL | Age: 87
Discharge: HOME | End: 2022-02-24
Attending: FAMILY MEDICINE
Payer: MEDICARE

## 2022-01-01 ENCOUNTER — OFFICE VISIT (OUTPATIENT)
Dept: NEUROLOGY | Facility: CLINIC | Age: 87
End: 2022-01-01
Payer: MEDICARE

## 2022-01-01 ENCOUNTER — LAB VISIT (OUTPATIENT)
Dept: LAB | Facility: HOSPITAL | Age: 87
End: 2022-01-01
Attending: PSYCHIATRY & NEUROLOGY
Payer: MEDICARE

## 2022-01-01 ENCOUNTER — CLINICAL SUPPORT (OUTPATIENT)
Dept: CARDIOLOGY | Facility: HOSPITAL | Age: 87
End: 2022-01-01
Attending: INTERNAL MEDICINE
Payer: MEDICARE

## 2022-01-01 ENCOUNTER — LAB VISIT (OUTPATIENT)
Dept: LAB | Facility: HOSPITAL | Age: 87
End: 2022-01-01
Attending: FAMILY MEDICINE
Payer: MEDICARE

## 2022-01-01 ENCOUNTER — TELEPHONE (OUTPATIENT)
Dept: CARDIOLOGY | Facility: HOSPITAL | Age: 87
End: 2022-01-01
Payer: MEDICARE

## 2022-01-01 VITALS
WEIGHT: 134.25 LBS | DIASTOLIC BLOOD PRESSURE: 66 MMHG | SYSTOLIC BLOOD PRESSURE: 128 MMHG | OXYGEN SATURATION: 99 % | HEART RATE: 85 BPM | BODY MASS INDEX: 20.34 KG/M2 | HEIGHT: 68 IN

## 2022-01-01 VITALS
HEIGHT: 69 IN | SYSTOLIC BLOOD PRESSURE: 118 MMHG | DIASTOLIC BLOOD PRESSURE: 70 MMHG | HEART RATE: 76 BPM | WEIGHT: 116.38 LBS | BODY MASS INDEX: 17.24 KG/M2

## 2022-01-01 VITALS
BODY MASS INDEX: 17.85 KG/M2 | HEIGHT: 69 IN | HEART RATE: 74 BPM | SYSTOLIC BLOOD PRESSURE: 118 MMHG | RESPIRATION RATE: 18 BRPM | WEIGHT: 120.5 LBS | DIASTOLIC BLOOD PRESSURE: 64 MMHG | OXYGEN SATURATION: 96 %

## 2022-01-01 VITALS
BODY MASS INDEX: 18.67 KG/M2 | DIASTOLIC BLOOD PRESSURE: 69 MMHG | HEART RATE: 78 BPM | SYSTOLIC BLOOD PRESSURE: 135 MMHG | WEIGHT: 126.44 LBS | RESPIRATION RATE: 18 BRPM

## 2022-01-01 VITALS
WEIGHT: 130.94 LBS | HEIGHT: 68 IN | HEART RATE: 68 BPM | DIASTOLIC BLOOD PRESSURE: 72 MMHG | BODY MASS INDEX: 19.84 KG/M2 | SYSTOLIC BLOOD PRESSURE: 116 MMHG

## 2022-01-01 VITALS — HEIGHT: 69 IN | RESPIRATION RATE: 22 BRPM | BODY MASS INDEX: 17.68 KG/M2 | WEIGHT: 119.38 LBS | HEART RATE: 86 BPM

## 2022-01-01 DIAGNOSIS — S22.000A COMPRESSION FRACTURE OF THORACIC VERTEBRA, UNSPECIFIED THORACIC VERTEBRAL LEVEL, INITIAL ENCOUNTER: ICD-10-CM

## 2022-01-01 DIAGNOSIS — Z12.31 ENCOUNTER FOR SCREENING MAMMOGRAM FOR MALIGNANT NEOPLASM OF BREAST: Primary | ICD-10-CM

## 2022-01-01 DIAGNOSIS — Z95.0 PRESENCE OF CARDIAC PACEMAKER: Primary | ICD-10-CM

## 2022-01-01 DIAGNOSIS — Z95.0 S/P PLACEMENT OF CARDIAC PACEMAKER: ICD-10-CM

## 2022-01-01 DIAGNOSIS — M25.552 LEFT HIP PAIN: Primary | ICD-10-CM

## 2022-01-01 DIAGNOSIS — Z00.00 ENCOUNTER FOR PREVENTIVE HEALTH EXAMINATION: Primary | ICD-10-CM

## 2022-01-01 DIAGNOSIS — Z95.0 PRESENCE OF CARDIAC PACEMAKER: ICD-10-CM

## 2022-01-01 DIAGNOSIS — I44.2 AV BLOCK, COMPLETE, POST OP COMPLICATION OF AV NODAL ABLATION: ICD-10-CM

## 2022-01-01 DIAGNOSIS — R00.1 BRADYCARDIA: ICD-10-CM

## 2022-01-01 DIAGNOSIS — R54 SENILE DEBILITY: ICD-10-CM

## 2022-01-01 DIAGNOSIS — K21.9 GASTROESOPHAGEAL REFLUX DISEASE WITHOUT ESOPHAGITIS: Primary | ICD-10-CM

## 2022-01-01 DIAGNOSIS — I97.190 AV BLOCK, COMPLETE, POST OP COMPLICATION OF AV NODAL ABLATION: ICD-10-CM

## 2022-01-01 DIAGNOSIS — R41.3 MEMORY LOSS: ICD-10-CM

## 2022-01-01 DIAGNOSIS — Z79.899 DRUG THERAPY: ICD-10-CM

## 2022-01-01 DIAGNOSIS — M54.9 ACUTE BILATERAL BACK PAIN, UNSPECIFIED BACK LOCATION: ICD-10-CM

## 2022-01-01 DIAGNOSIS — R26.81 UNSTEADY GAIT: ICD-10-CM

## 2022-01-01 DIAGNOSIS — F41.9 ANXIETY: Primary | ICD-10-CM

## 2022-01-01 DIAGNOSIS — F51.05 INSOMNIA SECONDARY TO ANXIETY: ICD-10-CM

## 2022-01-01 DIAGNOSIS — R41.89 IMPAIRED COGNITIVE ABILITY: Primary | ICD-10-CM

## 2022-01-01 DIAGNOSIS — R41.3 OTHER AMNESIA: Primary | ICD-10-CM

## 2022-01-01 DIAGNOSIS — R41.3 OTHER AMNESIA: ICD-10-CM

## 2022-01-01 DIAGNOSIS — F51.05 INSOMNIA SECONDARY TO ANXIETY: Primary | ICD-10-CM

## 2022-01-01 DIAGNOSIS — F41.9 ANXIETY: ICD-10-CM

## 2022-01-01 DIAGNOSIS — R41.3 MEMORY LOSS: Primary | ICD-10-CM

## 2022-01-01 DIAGNOSIS — E78.2 MIXED HYPERLIPIDEMIA: ICD-10-CM

## 2022-01-01 DIAGNOSIS — F03.91 DEMENTIA WITH BEHAVIORAL DISTURBANCE, UNSPECIFIED DEMENTIA TYPE: ICD-10-CM

## 2022-01-01 DIAGNOSIS — I10 ESSENTIAL HYPERTENSION: ICD-10-CM

## 2022-01-01 DIAGNOSIS — F03.918 DEMENTIA WITH BEHAVIORAL DISTURBANCE: ICD-10-CM

## 2022-01-01 DIAGNOSIS — E83.52 HYPERCALCEMIA: Primary | ICD-10-CM

## 2022-01-01 DIAGNOSIS — S22.000A COMPRESSION FRACTURE OF THORACIC VERTEBRA, UNSPECIFIED THORACIC VERTEBRAL LEVEL, INITIAL ENCOUNTER: Primary | ICD-10-CM

## 2022-01-01 DIAGNOSIS — F41.9 INSOMNIA SECONDARY TO ANXIETY: ICD-10-CM

## 2022-01-01 DIAGNOSIS — F03.911 AGITATION DUE TO DEMENTIA: ICD-10-CM

## 2022-01-01 DIAGNOSIS — G47.00 INSOMNIA, UNSPECIFIED TYPE: ICD-10-CM

## 2022-01-01 DIAGNOSIS — R94.31 PROLONGED QT INTERVAL: ICD-10-CM

## 2022-01-01 DIAGNOSIS — F41.9 INSOMNIA SECONDARY TO ANXIETY: Primary | ICD-10-CM

## 2022-01-01 DIAGNOSIS — Z12.31 ENCOUNTER FOR SCREENING MAMMOGRAM FOR MALIGNANT NEOPLASM OF BREAST: ICD-10-CM

## 2022-01-01 DIAGNOSIS — N30.00 ACUTE INFECTIVE CYSTITIS: Primary | ICD-10-CM

## 2022-01-01 DIAGNOSIS — Z78.0 POST-MENOPAUSAL: ICD-10-CM

## 2022-01-01 DIAGNOSIS — R41.3 MEMORY IMPAIRMENT OF GRADUAL ONSET: Primary | ICD-10-CM

## 2022-01-01 DIAGNOSIS — F03.911 AGITATION DUE TO DEMENTIA: Primary | ICD-10-CM

## 2022-01-01 DIAGNOSIS — R41.3 MEMORY IMPAIRMENT OF GRADUAL ONSET: ICD-10-CM

## 2022-01-01 DIAGNOSIS — M25.552 LEFT HIP PAIN: ICD-10-CM

## 2022-01-01 DIAGNOSIS — Z00.00 UNSPECIFIED EXAMINATION: Primary | ICD-10-CM

## 2022-01-01 LAB
ALBUMIN SERPL BCP-MCNC: 3.9 G/DL (ref 3.5–5.2)
ALP SERPL-CCNC: 149 U/L (ref 55–135)
ALT SERPL W/O P-5'-P-CCNC: 21 U/L (ref 10–44)
AMMONIA PLAS-SCNC: 29 UMOL/L (ref 10–50)
ANION GAP SERPL CALC-SCNC: 13 MMOL/L (ref 8–16)
AST SERPL-CCNC: 29 U/L (ref 10–40)
BACTERIA UR CULT: ABNORMAL
BASOPHILS # BLD AUTO: 0.02 K/UL (ref 0–0.2)
BASOPHILS NFR BLD: 0.3 % (ref 0–1.9)
BILIRUB SERPL-MCNC: 0.6 MG/DL (ref 0.1–1)
BUN SERPL-MCNC: 12 MG/DL (ref 8–23)
CALCIUM SERPL-MCNC: 10 MG/DL (ref 8.7–10.5)
CHLORIDE SERPL-SCNC: 105 MMOL/L (ref 95–110)
CO2 SERPL-SCNC: 18 MMOL/L (ref 23–29)
CREAT SERPL-MCNC: 0.7 MG/DL (ref 0.5–1.4)
DIFFERENTIAL METHOD: ABNORMAL
EOSINOPHIL # BLD AUTO: 0 K/UL (ref 0–0.5)
EOSINOPHIL NFR BLD: 0.3 % (ref 0–8)
ERYTHROCYTE [DISTWIDTH] IN BLOOD BY AUTOMATED COUNT: 15.1 % (ref 11.5–14.5)
EST. GFR  (AFRICAN AMERICAN): >60 ML/MIN/1.73 M^2
EST. GFR  (NON AFRICAN AMERICAN): >60 ML/MIN/1.73 M^2
FOLATE SERPL-MCNC: 10.7 NG/ML (ref 4–24)
GLUCOSE SERPL-MCNC: 151 MG/DL (ref 70–110)
HCT VFR BLD AUTO: 43.5 % (ref 37–48.5)
HGB BLD-MCNC: 14.3 G/DL (ref 12–16)
IMM GRANULOCYTES # BLD AUTO: 0.01 K/UL (ref 0–0.04)
IMM GRANULOCYTES NFR BLD AUTO: 0.2 % (ref 0–0.5)
LYMPHOCYTES # BLD AUTO: 1.7 K/UL (ref 1–4.8)
LYMPHOCYTES NFR BLD: 27.5 % (ref 18–48)
MCH RBC QN AUTO: 31.2 PG (ref 27–31)
MCHC RBC AUTO-ENTMCNC: 32.9 G/DL (ref 32–36)
MCV RBC AUTO: 95 FL (ref 82–98)
MONOCYTES # BLD AUTO: 0.4 K/UL (ref 0.3–1)
MONOCYTES NFR BLD: 7.1 % (ref 4–15)
NEUTROPHILS # BLD AUTO: 3.9 K/UL (ref 1.8–7.7)
NEUTROPHILS NFR BLD: 64.6 % (ref 38–73)
NRBC BLD-RTO: 0 /100 WBC
PLATELET # BLD AUTO: 257 K/UL (ref 150–450)
PMV BLD AUTO: 12.1 FL (ref 9.2–12.9)
POTASSIUM SERPL-SCNC: 3.5 MMOL/L (ref 3.5–5.1)
PROT SERPL-MCNC: 7.5 G/DL (ref 6–8.4)
RBC # BLD AUTO: 4.58 M/UL (ref 4–5.4)
RPR SER QL: NORMAL
SODIUM SERPL-SCNC: 136 MMOL/L (ref 136–145)
VIT B1 BLD-MCNC: 57 UG/L (ref 38–122)
WBC # BLD AUTO: 6.08 K/UL (ref 3.9–12.7)

## 2022-01-01 PROCEDURE — 1160F RVW MEDS BY RX/DR IN RCRD: CPT | Mod: CPTII,95,, | Performed by: FAMILY MEDICINE

## 2022-01-01 PROCEDURE — 77063 BREAST TOMOSYNTHESIS BI: CPT | Mod: 26,HCNC,, | Performed by: RADIOLOGY

## 2022-01-01 PROCEDURE — 87077 CULTURE AEROBIC IDENTIFY: CPT | Performed by: FAMILY MEDICINE

## 2022-01-01 PROCEDURE — 93280 PM DEVICE PROGR EVAL DUAL: CPT | Mod: 26,,, | Performed by: INTERNAL MEDICINE

## 2022-01-01 PROCEDURE — 93296 REM INTERROG EVL PM/IDS: CPT | Mod: PO | Performed by: INTERNAL MEDICINE

## 2022-01-01 PROCEDURE — G0439 PR MEDICARE ANNUAL WELLNESS SUBSEQUENT VISIT: ICD-10-PCS | Mod: HCNC,S$GLB,, | Performed by: NURSE PRACTITIONER

## 2022-01-01 PROCEDURE — 99499 RISK ADDL DX/OHS AUDIT: ICD-10-PCS | Mod: S$GLB,,, | Performed by: NURSE PRACTITIONER

## 2022-01-01 PROCEDURE — 3074F PR MOST RECENT SYSTOLIC BLOOD PRESSURE < 130 MM HG: ICD-10-PCS | Mod: CPTII,S$GLB,, | Performed by: FAMILY MEDICINE

## 2022-01-01 PROCEDURE — 3288F PR FALLS RISK ASSESSMENT DOCUMENTED: ICD-10-PCS | Mod: CPTII,S$GLB,, | Performed by: FAMILY MEDICINE

## 2022-01-01 PROCEDURE — 1100F PR PT FALLS ASSESS DOC 2+ FALLS/FALL W/INJURY/YR: ICD-10-PCS | Mod: CPTII,S$GLB,, | Performed by: FAMILY MEDICINE

## 2022-01-01 PROCEDURE — 1159F PR MEDICATION LIST DOCUMENTED IN MEDICAL RECORD: ICD-10-PCS | Mod: HCNC,CPTII,S$GLB, | Performed by: NURSE PRACTITIONER

## 2022-01-01 PROCEDURE — 99214 OFFICE O/P EST MOD 30 MIN: CPT | Mod: S$GLB,,, | Performed by: FAMILY MEDICINE

## 2022-01-01 PROCEDURE — 77067 MAMMO DIGITAL SCREENING BILAT WITH TOMO: ICD-10-PCS | Mod: 26,HCNC,, | Performed by: RADIOLOGY

## 2022-01-01 PROCEDURE — 73502 X-RAY EXAM HIP UNI 2-3 VIEWS: CPT | Mod: TC,PN,LT

## 2022-01-01 PROCEDURE — 1100F PTFALLS ASSESS-DOCD GE2>/YR: CPT | Mod: CPTII,S$GLB,, | Performed by: FAMILY MEDICINE

## 2022-01-01 PROCEDURE — 3078F DIAST BP <80 MM HG: CPT | Mod: HCNC,CPTII,S$GLB, | Performed by: NURSE PRACTITIONER

## 2022-01-01 PROCEDURE — 82140 ASSAY OF AMMONIA: CPT | Performed by: PSYCHIATRY & NEUROLOGY

## 2022-01-01 PROCEDURE — 3288F FALL RISK ASSESSMENT DOCD: CPT | Mod: CPTII,S$GLB,, | Performed by: FAMILY MEDICINE

## 2022-01-01 PROCEDURE — 80053 COMPREHEN METABOLIC PANEL: CPT | Performed by: PSYCHIATRY & NEUROLOGY

## 2022-01-01 PROCEDURE — 3078F DIAST BP <80 MM HG: CPT | Mod: CPTII,S$GLB,, | Performed by: FAMILY MEDICINE

## 2022-01-01 PROCEDURE — 73502 XR HIP WITH PELVIS WHEN PERFORMED, 2 OR 3 VIEWS LEFT: ICD-10-PCS | Mod: 26,LT,, | Performed by: RADIOLOGY

## 2022-01-01 PROCEDURE — 99499 UNLISTED E&M SERVICE: CPT | Mod: S$GLB,,, | Performed by: FAMILY MEDICINE

## 2022-01-01 PROCEDURE — 1160F RVW MEDS BY RX/DR IN RCRD: CPT | Mod: CPTII,S$GLB,, | Performed by: FAMILY MEDICINE

## 2022-01-01 PROCEDURE — 72220 X-RAY EXAM SACRUM TAILBONE: CPT | Mod: TC,PN

## 2022-01-01 PROCEDURE — 1159F MED LIST DOCD IN RCRD: CPT | Mod: HCNC,CPTII,S$GLB, | Performed by: NURSE PRACTITIONER

## 2022-01-01 PROCEDURE — 72080 XR THORACOLUMBAR SPINE AP LATERAL: ICD-10-PCS | Mod: 26,,, | Performed by: RADIOLOGY

## 2022-01-01 PROCEDURE — 72220 X-RAY EXAM SACRUM TAILBONE: CPT | Mod: 26,,, | Performed by: RADIOLOGY

## 2022-01-01 PROCEDURE — 82746 ASSAY OF FOLIC ACID SERUM: CPT | Performed by: PSYCHIATRY & NEUROLOGY

## 2022-01-01 PROCEDURE — G0180 MD CERTIFICATION HHA PATIENT: HCPCS | Mod: ,,, | Performed by: FAMILY MEDICINE

## 2022-01-01 PROCEDURE — 1125F AMNT PAIN NOTED PAIN PRSNT: CPT | Mod: CPTII,S$GLB,, | Performed by: FAMILY MEDICINE

## 2022-01-01 PROCEDURE — 1159F MED LIST DOCD IN RCRD: CPT | Mod: CPTII,S$GLB,, | Performed by: FAMILY MEDICINE

## 2022-01-01 PROCEDURE — 1160F PR REVIEW ALL MEDS BY PRESCRIBER/CLIN PHARMACIST DOCUMENTED: ICD-10-PCS | Mod: CPTII,S$GLB,, | Performed by: FAMILY MEDICINE

## 2022-01-01 PROCEDURE — 72080 X-RAY EXAM THORACOLMB 2/> VW: CPT | Mod: 26,,, | Performed by: RADIOLOGY

## 2022-01-01 PROCEDURE — 1160F PR REVIEW ALL MEDS BY PRESCRIBER/CLIN PHARMACIST DOCUMENTED: ICD-10-PCS | Mod: HCNC,CPTII,S$GLB, | Performed by: NURSE PRACTITIONER

## 2022-01-01 PROCEDURE — 1101F PT FALLS ASSESS-DOCD LE1/YR: CPT | Mod: HCNC,CPTII,S$GLB, | Performed by: NURSE PRACTITIONER

## 2022-01-01 PROCEDURE — 99999 PR PBB SHADOW E&M-EST. PATIENT-LVL IV: CPT | Mod: PBBFAC,HCNC,, | Performed by: NURSE PRACTITIONER

## 2022-01-01 PROCEDURE — 72080 X-RAY EXAM THORACOLMB 2/> VW: CPT | Mod: TC,PN

## 2022-01-01 PROCEDURE — 1125F PR PAIN SEVERITY QUANTIFIED, PAIN PRESENT: ICD-10-PCS | Mod: CPTII,S$GLB,, | Performed by: FAMILY MEDICINE

## 2022-01-01 PROCEDURE — G0180 PR HOME HEALTH MD CERTIFICATION: ICD-10-PCS | Mod: ,,, | Performed by: FAMILY MEDICINE

## 2022-01-01 PROCEDURE — 99499 RISK ADDL DX/OHS AUDIT: ICD-10-PCS | Mod: S$GLB,,, | Performed by: FAMILY MEDICINE

## 2022-01-01 PROCEDURE — 99999 PR PBB SHADOW E&M-EST. PATIENT-LVL III: CPT | Mod: PBBFAC,,, | Performed by: FAMILY MEDICINE

## 2022-01-01 PROCEDURE — 1126F PR PAIN SEVERITY QUANTIFIED, NO PAIN PRESENT: ICD-10-PCS | Mod: HCNC,CPTII,S$GLB, | Performed by: NURSE PRACTITIONER

## 2022-01-01 PROCEDURE — 1126F PR PAIN SEVERITY QUANTIFIED, NO PAIN PRESENT: ICD-10-PCS | Mod: CPTII,S$GLB,, | Performed by: PSYCHIATRY & NEUROLOGY

## 2022-01-01 PROCEDURE — 3078F PR MOST RECENT DIASTOLIC BLOOD PRESSURE < 80 MM HG: ICD-10-PCS | Mod: CPTII,S$GLB,, | Performed by: FAMILY MEDICINE

## 2022-01-01 PROCEDURE — 87186 SC STD MICRODIL/AGAR DIL: CPT | Performed by: FAMILY MEDICINE

## 2022-01-01 PROCEDURE — 3074F PR MOST RECENT SYSTOLIC BLOOD PRESSURE < 130 MM HG: ICD-10-PCS | Mod: HCNC,CPTII,S$GLB, | Performed by: NURSE PRACTITIONER

## 2022-01-01 PROCEDURE — 99214 PR OFFICE/OUTPT VISIT, EST, LEVL IV, 30-39 MIN: ICD-10-PCS | Mod: S$GLB,,, | Performed by: FAMILY MEDICINE

## 2022-01-01 PROCEDURE — G0439 PPPS, SUBSEQ VISIT: HCPCS | Mod: HCNC,S$GLB,, | Performed by: NURSE PRACTITIONER

## 2022-01-01 PROCEDURE — 1126F AMNT PAIN NOTED NONE PRSNT: CPT | Mod: HCNC,CPTII,S$GLB, | Performed by: NURSE PRACTITIONER

## 2022-01-01 PROCEDURE — G0179 MD RECERTIFICATION HHA PT: HCPCS | Mod: ,,, | Performed by: FAMILY MEDICINE

## 2022-01-01 PROCEDURE — G0179 PR HOME HEALTH MD RECERTIFICATION: ICD-10-PCS | Mod: ,,, | Performed by: FAMILY MEDICINE

## 2022-01-01 PROCEDURE — 3074F SYST BP LT 130 MM HG: CPT | Mod: CPTII,S$GLB,, | Performed by: FAMILY MEDICINE

## 2022-01-01 PROCEDURE — 1159F PR MEDICATION LIST DOCUMENTED IN MEDICAL RECORD: ICD-10-PCS | Mod: CPTII,S$GLB,, | Performed by: FAMILY MEDICINE

## 2022-01-01 PROCEDURE — 99499 UNLISTED E&M SERVICE: CPT | Mod: S$GLB,,, | Performed by: PSYCHIATRY & NEUROLOGY

## 2022-01-01 PROCEDURE — 1159F MED LIST DOCD IN RCRD: CPT | Mod: CPTII,S$GLB,, | Performed by: PSYCHIATRY & NEUROLOGY

## 2022-01-01 PROCEDURE — 3075F SYST BP GE 130 - 139MM HG: CPT | Mod: CPTII,S$GLB,, | Performed by: PSYCHIATRY & NEUROLOGY

## 2022-01-01 PROCEDURE — 3078F PR MOST RECENT DIASTOLIC BLOOD PRESSURE < 80 MM HG: ICD-10-PCS | Mod: HCNC,CPTII,S$GLB, | Performed by: NURSE PRACTITIONER

## 2022-01-01 PROCEDURE — 1159F PR MEDICATION LIST DOCUMENTED IN MEDICAL RECORD: ICD-10-PCS | Mod: CPTII,95,, | Performed by: FAMILY MEDICINE

## 2022-01-01 PROCEDURE — 1100F PTFALLS ASSESS-DOCD GE2>/YR: CPT | Mod: CPTII,S$GLB,, | Performed by: PSYCHIATRY & NEUROLOGY

## 2022-01-01 PROCEDURE — 99999 PR PBB SHADOW E&M-EST. PATIENT-LVL IV: ICD-10-PCS | Mod: PBBFAC,,, | Performed by: FAMILY MEDICINE

## 2022-01-01 PROCEDURE — 99999 PR PBB SHADOW E&M-EST. PATIENT-LVL III: ICD-10-PCS | Mod: PBBFAC,,, | Performed by: FAMILY MEDICINE

## 2022-01-01 PROCEDURE — 99214 PR OFFICE/OUTPT VISIT, EST, LEVL IV, 30-39 MIN: ICD-10-PCS | Mod: 95,,, | Performed by: FAMILY MEDICINE

## 2022-01-01 PROCEDURE — 99499 UNLISTED E&M SERVICE: CPT | Mod: 95,,, | Performed by: FAMILY MEDICINE

## 2022-01-01 PROCEDURE — 99499 RISK ADDL DX/OHS AUDIT: ICD-10-PCS | Mod: 95,,, | Performed by: FAMILY MEDICINE

## 2022-01-01 PROCEDURE — 99999 PR PBB SHADOW E&M-EST. PATIENT-LVL IV: ICD-10-PCS | Mod: PBBFAC,HCNC,, | Performed by: NURSE PRACTITIONER

## 2022-01-01 PROCEDURE — 93005 ELECTROCARDIOGRAM TRACING: CPT | Mod: PO

## 2022-01-01 PROCEDURE — 3288F PR FALLS RISK ASSESSMENT DOCUMENTED: ICD-10-PCS | Mod: HCNC,CPTII,S$GLB, | Performed by: NURSE PRACTITIONER

## 2022-01-01 PROCEDURE — 99213 OFFICE O/P EST LOW 20 MIN: CPT | Mod: 95,,, | Performed by: FAMILY MEDICINE

## 2022-01-01 PROCEDURE — 72220 XR SACRUM AND COCCYX: ICD-10-PCS | Mod: 26,,, | Performed by: RADIOLOGY

## 2022-01-01 PROCEDURE — 1101F PR PT FALLS ASSESS DOC 0-1 FALLS W/OUT INJ PAST YR: ICD-10-PCS | Mod: HCNC,CPTII,S$GLB, | Performed by: NURSE PRACTITIONER

## 2022-01-01 PROCEDURE — 3078F DIAST BP <80 MM HG: CPT | Mod: CPTII,S$GLB,, | Performed by: PSYCHIATRY & NEUROLOGY

## 2022-01-01 PROCEDURE — 1160F RVW MEDS BY RX/DR IN RCRD: CPT | Mod: HCNC,CPTII,S$GLB, | Performed by: NURSE PRACTITIONER

## 2022-01-01 PROCEDURE — 1101F PR PT FALLS ASSESS DOC 0-1 FALLS W/OUT INJ PAST YR: ICD-10-PCS | Mod: CPTII,S$GLB,, | Performed by: FAMILY MEDICINE

## 2022-01-01 PROCEDURE — 77067 SCR MAMMO BI INCL CAD: CPT | Mod: 26,HCNC,, | Performed by: RADIOLOGY

## 2022-01-01 PROCEDURE — 77063 MAMMO DIGITAL SCREENING BILAT WITH TOMO: ICD-10-PCS | Mod: 26,HCNC,, | Performed by: RADIOLOGY

## 2022-01-01 PROCEDURE — 1159F MED LIST DOCD IN RCRD: CPT | Mod: CPTII,95,, | Performed by: FAMILY MEDICINE

## 2022-01-01 PROCEDURE — 3288F FALL RISK ASSESSMENT DOCD: CPT | Mod: HCNC,CPTII,S$GLB, | Performed by: NURSE PRACTITIONER

## 2022-01-01 PROCEDURE — 1101F PT FALLS ASSESS-DOCD LE1/YR: CPT | Mod: CPTII,S$GLB,, | Performed by: FAMILY MEDICINE

## 2022-01-01 PROCEDURE — 73502 X-RAY EXAM HIP UNI 2-3 VIEWS: CPT | Mod: 26,LT,, | Performed by: RADIOLOGY

## 2022-01-01 PROCEDURE — 3288F FALL RISK ASSESSMENT DOCD: CPT | Mod: CPTII,S$GLB,, | Performed by: PSYCHIATRY & NEUROLOGY

## 2022-01-01 PROCEDURE — 77063 BREAST TOMOSYNTHESIS BI: CPT | Mod: TC,HCNC,PO

## 2022-01-01 PROCEDURE — 99499 UNLISTED E&M SERVICE: CPT | Mod: S$GLB,,, | Performed by: NURSE PRACTITIONER

## 2022-01-01 PROCEDURE — 87088 URINE BACTERIA CULTURE: CPT | Performed by: FAMILY MEDICINE

## 2022-01-01 PROCEDURE — 1126F AMNT PAIN NOTED NONE PRSNT: CPT | Mod: CPTII,S$GLB,, | Performed by: FAMILY MEDICINE

## 2022-01-01 PROCEDURE — 99999 PR PBB SHADOW E&M-EST. PATIENT-LVL IV: CPT | Mod: PBBFAC,,, | Performed by: PSYCHIATRY & NEUROLOGY

## 2022-01-01 PROCEDURE — 93294 REM INTERROG EVL PM/LDLS PM: CPT | Mod: ,,, | Performed by: INTERNAL MEDICINE

## 2022-01-01 PROCEDURE — 93294 CARDIAC DEVICE CHECK - REMOTE: ICD-10-PCS | Mod: ,,, | Performed by: INTERNAL MEDICINE

## 2022-01-01 PROCEDURE — 99483 ASSMT & CARE PLN PT COG IMP: CPT | Mod: S$GLB,,, | Performed by: PSYCHIATRY & NEUROLOGY

## 2022-01-01 PROCEDURE — 93010 EKG 12-LEAD: ICD-10-PCS | Mod: ,,, | Performed by: INTERNAL MEDICINE

## 2022-01-01 PROCEDURE — 84425 ASSAY OF VITAMIN B-1: CPT | Performed by: PSYCHIATRY & NEUROLOGY

## 2022-01-01 PROCEDURE — 1160F PR REVIEW ALL MEDS BY PRESCRIBER/CLIN PHARMACIST DOCUMENTED: ICD-10-PCS | Mod: CPTII,95,, | Performed by: FAMILY MEDICINE

## 2022-01-01 PROCEDURE — 3078F PR MOST RECENT DIASTOLIC BLOOD PRESSURE < 80 MM HG: ICD-10-PCS | Mod: CPTII,S$GLB,, | Performed by: PSYCHIATRY & NEUROLOGY

## 2022-01-01 PROCEDURE — 85025 COMPLETE CBC W/AUTO DIFF WBC: CPT | Performed by: PSYCHIATRY & NEUROLOGY

## 2022-01-01 PROCEDURE — 99213 PR OFFICE/OUTPT VISIT, EST, LEVL III, 20-29 MIN: ICD-10-PCS | Mod: 95,,, | Performed by: FAMILY MEDICINE

## 2022-01-01 PROCEDURE — 87086 URINE CULTURE/COLONY COUNT: CPT | Performed by: FAMILY MEDICINE

## 2022-01-01 PROCEDURE — 1126F PR PAIN SEVERITY QUANTIFIED, NO PAIN PRESENT: ICD-10-PCS | Mod: CPTII,S$GLB,, | Performed by: FAMILY MEDICINE

## 2022-01-01 PROCEDURE — 99499 NO LOS: ICD-10-PCS | Mod: S$GLB,,, | Performed by: PSYCHIATRY & NEUROLOGY

## 2022-01-01 PROCEDURE — 99999 PR PBB SHADOW E&M-EST. PATIENT-LVL IV: CPT | Mod: PBBFAC,,, | Performed by: FAMILY MEDICINE

## 2022-01-01 PROCEDURE — 3074F SYST BP LT 130 MM HG: CPT | Mod: HCNC,CPTII,S$GLB, | Performed by: NURSE PRACTITIONER

## 2022-01-01 PROCEDURE — 1159F PR MEDICATION LIST DOCUMENTED IN MEDICAL RECORD: ICD-10-PCS | Mod: CPTII,S$GLB,, | Performed by: PSYCHIATRY & NEUROLOGY

## 2022-01-01 PROCEDURE — 1126F AMNT PAIN NOTED NONE PRSNT: CPT | Mod: CPTII,S$GLB,, | Performed by: PSYCHIATRY & NEUROLOGY

## 2022-01-01 PROCEDURE — 93280 CARDIAC DEVICE CHECK - IN CLINIC & HOSPITAL: ICD-10-PCS | Mod: 26,,, | Performed by: INTERNAL MEDICINE

## 2022-01-01 PROCEDURE — 99999 PR PBB SHADOW E&M-EST. PATIENT-LVL IV: ICD-10-PCS | Mod: PBBFAC,,, | Performed by: PSYCHIATRY & NEUROLOGY

## 2022-01-01 PROCEDURE — 1170F PR FUNCTIONAL STATUS ASSESSED: ICD-10-PCS | Mod: HCNC,CPTII,S$GLB, | Performed by: NURSE PRACTITIONER

## 2022-01-01 PROCEDURE — 99214 OFFICE O/P EST MOD 30 MIN: CPT | Mod: 95,,, | Performed by: FAMILY MEDICINE

## 2022-01-01 PROCEDURE — 3288F PR FALLS RISK ASSESSMENT DOCUMENTED: ICD-10-PCS | Mod: CPTII,S$GLB,, | Performed by: PSYCHIATRY & NEUROLOGY

## 2022-01-01 PROCEDURE — 93010 ELECTROCARDIOGRAM REPORT: CPT | Mod: ,,, | Performed by: INTERNAL MEDICINE

## 2022-01-01 PROCEDURE — 86592 SYPHILIS TEST NON-TREP QUAL: CPT | Performed by: PSYCHIATRY & NEUROLOGY

## 2022-01-01 PROCEDURE — 3075F PR MOST RECENT SYSTOLIC BLOOD PRESS GE 130-139MM HG: ICD-10-PCS | Mod: CPTII,S$GLB,, | Performed by: PSYCHIATRY & NEUROLOGY

## 2022-01-01 PROCEDURE — 99483 PR ASSMT/CARE PLANNING, PT W/COGN IMPAIRMENT: ICD-10-PCS | Mod: S$GLB,,, | Performed by: PSYCHIATRY & NEUROLOGY

## 2022-01-01 PROCEDURE — 1170F FXNL STATUS ASSESSED: CPT | Mod: HCNC,CPTII,S$GLB, | Performed by: NURSE PRACTITIONER

## 2022-01-01 PROCEDURE — 1100F PR PT FALLS ASSESS DOC 2+ FALLS/FALL W/INJURY/YR: ICD-10-PCS | Mod: CPTII,S$GLB,, | Performed by: PSYCHIATRY & NEUROLOGY

## 2022-01-01 RX ORDER — BUSPIRONE HYDROCHLORIDE 10 MG/1
10 TABLET ORAL 2 TIMES DAILY
Qty: 180 TABLET | Refills: 1 | Status: SHIPPED | OUTPATIENT
Start: 2022-01-01 | End: 2022-01-01 | Stop reason: SDUPTHER

## 2022-01-01 RX ORDER — TRAMADOL HYDROCHLORIDE 50 MG/1
50 TABLET ORAL EVERY 8 HOURS PRN
Qty: 90 TABLET | Refills: 0 | Status: SHIPPED | OUTPATIENT
Start: 2022-01-01 | End: 2022-01-01

## 2022-01-01 RX ORDER — AMITRIPTYLINE HYDROCHLORIDE 10 MG/1
TABLET, FILM COATED ORAL
Qty: 90 TABLET | Refills: 1 | Status: SHIPPED | OUTPATIENT
Start: 2022-01-01 | End: 2022-01-01

## 2022-01-01 RX ORDER — DONEPEZIL HYDROCHLORIDE 10 MG/1
TABLET, FILM COATED ORAL
Qty: 30 TABLET | Refills: 2 | Status: SHIPPED | OUTPATIENT
Start: 2022-01-01 | End: 2022-01-01

## 2022-01-01 RX ORDER — BUSPIRONE HYDROCHLORIDE 5 MG/1
5 TABLET ORAL 2 TIMES DAILY
Qty: 180 TABLET | Refills: 1 | Status: SHIPPED | OUTPATIENT
Start: 2022-01-01 | End: 2022-01-01

## 2022-01-01 RX ORDER — TRAZODONE HYDROCHLORIDE 50 MG/1
50 TABLET ORAL NIGHTLY
Qty: 30 TABLET | Refills: 11 | Status: SHIPPED | OUTPATIENT
Start: 2022-01-01 | End: 2022-01-01

## 2022-01-01 RX ORDER — OLANZAPINE 5 MG/1
5 TABLET ORAL NIGHTLY
Qty: 30 TABLET | Refills: 11 | Status: SHIPPED | OUTPATIENT
Start: 2022-01-01 | End: 2022-01-01 | Stop reason: SDUPTHER

## 2022-01-01 RX ORDER — ESCITALOPRAM OXALATE 10 MG/1
10 TABLET ORAL DAILY
Qty: 30 TABLET | Refills: 11 | Status: SHIPPED | OUTPATIENT
Start: 2022-01-01 | End: 2022-01-01

## 2022-01-01 RX ORDER — ESCITALOPRAM OXALATE 10 MG/1
10 TABLET ORAL DAILY
Qty: 90 TABLET | Refills: 3 | Status: ON HOLD | OUTPATIENT
Start: 2022-01-01 | End: 2022-01-01 | Stop reason: HOSPADM

## 2022-01-01 RX ORDER — CALCITONIN SALMON 200 [IU]/.09ML
SPRAY, METERED NASAL
Qty: 3.7 ML | Refills: 0 | Status: SHIPPED | OUTPATIENT
Start: 2022-01-01 | End: 2022-01-01 | Stop reason: SDUPTHER

## 2022-01-01 RX ORDER — TRAZODONE HYDROCHLORIDE 100 MG/1
100 TABLET ORAL NIGHTLY
Qty: 90 TABLET | Refills: 3 | Status: ON HOLD | OUTPATIENT
Start: 2022-01-01 | End: 2022-01-01 | Stop reason: HOSPADM

## 2022-01-01 RX ORDER — OLANZAPINE 2.5 MG/1
2.5 TABLET ORAL 2 TIMES DAILY PRN
Qty: 60 TABLET | Refills: 11 | Status: SHIPPED | OUTPATIENT
Start: 2022-01-01 | End: 2022-01-01

## 2022-01-01 RX ORDER — CALCITONIN SALMON 200 [IU]/.09ML
SPRAY, METERED NASAL
Qty: 3.7 ML | Refills: 0 | Status: ON HOLD | OUTPATIENT
Start: 2022-01-01 | End: 2022-01-01 | Stop reason: HOSPADM

## 2022-01-01 RX ORDER — BUSPIRONE HYDROCHLORIDE 10 MG/1
10 TABLET ORAL 2 TIMES DAILY
COMMUNITY
End: 2022-01-01 | Stop reason: SDUPTHER

## 2022-01-01 RX ORDER — BUSPIRONE HYDROCHLORIDE 5 MG/1
TABLET ORAL 2 TIMES DAILY
COMMUNITY
Start: 2022-01-01 | End: 2022-01-01 | Stop reason: SDUPTHER

## 2022-01-01 RX ORDER — CALCITONIN SALMON 200 [IU]/.09ML
SPRAY, METERED NASAL
Qty: 11.1 ML | OUTPATIENT
Start: 2022-01-01

## 2022-01-01 RX ORDER — FLUOXETINE 10 MG/1
10 CAPSULE ORAL DAILY
Qty: 30 CAPSULE | Refills: 11 | Status: SHIPPED | OUTPATIENT
Start: 2022-01-01 | End: 2022-01-01

## 2022-01-01 RX ORDER — OLANZAPINE 5 MG/1
5 TABLET ORAL NIGHTLY
Qty: 60 TABLET | Refills: 11 | Status: ON HOLD | OUTPATIENT
Start: 2022-01-01 | End: 2022-01-01 | Stop reason: HOSPADM

## 2022-01-01 RX ORDER — TIZANIDINE 4 MG/1
4 TABLET ORAL EVERY 8 HOURS PRN
Qty: 30 TABLET | Refills: 2 | Status: SHIPPED | OUTPATIENT
Start: 2022-01-01 | End: 2022-01-01

## 2022-01-01 RX ORDER — MELOXICAM 15 MG/1
15 TABLET ORAL DAILY PRN
Qty: 60 TABLET | Refills: 0 | Status: SHIPPED | OUTPATIENT
Start: 2022-01-01 | End: 2022-01-01

## 2022-01-01 RX ORDER — LIDOCAINE 50 MG/G
1 PATCH TOPICAL DAILY
Qty: 30 PATCH | Refills: 3 | Status: SHIPPED | OUTPATIENT
Start: 2022-01-01 | End: 2022-01-01

## 2022-01-01 RX ORDER — MIRTAZAPINE 15 MG/1
15 TABLET, FILM COATED ORAL NIGHTLY
Qty: 30 TABLET | Refills: 11 | Status: SHIPPED | OUTPATIENT
Start: 2022-01-01 | End: 2022-01-01

## 2022-01-01 RX ORDER — CEPHALEXIN 500 MG/1
500 CAPSULE ORAL EVERY 12 HOURS
Qty: 10 CAPSULE | Refills: 0 | Status: SHIPPED | OUTPATIENT
Start: 2022-01-01 | End: 2022-01-01

## 2022-02-15 NOTE — TELEPHONE ENCOUNTER
----- Message from Davidson Hardin sent at 2/15/2022 10:25 AM CST -----  Type:  RX Refill Request  Who Called: Patient  Refill or New Rx: refill  RX Name and Strength: amitriptyline (ELAVIL) 10 MG tablet 90 tablet   How is the patient currently taking it? (ex. 1XDay):    Is this a 30 day or 90 day RX:    Preferred Pharmacy with phone number:  [id number] [place] [address] AT [place] [address] & [facility]   Phone:  [phone]  Fax:  [phone]  Local or Mail Order:  Local  Ordering Provider:  Grzegorz Gutierrez MD  Best Call Back Number:  [phone]  Additional Information: Pt requesting a call back concerning her medication. Pt also requesting refills on Rx amitriptyline (ELAVIL) 10 MG tablet 90 tablet . Pt states the pharmacy keeps giving her 2/3 pills.Pt also requesting a sooner appt. Pt denied first available 03/23/22.

## 2022-02-15 NOTE — TELEPHONE ENCOUNTER
No new care gaps identified.  Powered by MySQL by [employer]. Reference number: [id number].   2/15/2022 11:06:08 AM CST

## 2022-03-04 NOTE — TELEPHONE ENCOUNTER
Patient Needs an annual visit with Dr. Gutierrez   Can you call daughter to schedule this?   Thanks, gabby

## 2022-03-04 NOTE — PATIENT INSTRUCTIONS
Counseling and Referral of Other Preventative  (Italic type indicates deductible and co-insurance are waived)    Patient Name: Ladonna St  Today's Date: 3/4/2022    Health Maintenance       Date Due Completion Date    Shingles Vaccine (1 of 2) Never done ---    DEXA Scan 03/26/2022 3/26/2019    Override on 10/20/2015: Not Clinically Appropriate    Lipid Panel 10/24/2024 10/24/2019    Colonoscopy 01/28/2025 1/28/2020    TETANUS VACCINE 06/22/2026 6/22/2016 (Declined)    Override on 6/22/2016: Declined        No orders of the defined types were placed in this encounter.    The following information is provided to all patients.  This information is to help you find resources for any of the problems found today that may be affecting your health:                Living healthy guide: www.[facility].[place].gov      Understanding Diabetes: www.diabetes.org      Eating healthy: www.cdc.gov/healthyweight      [facility] home safety checklist: www.cdc.gov/steadi/patient.html      Agency on Aging: www.goea.[place].[facility]      Alcoholics anonymous (AA): www.aa.org      Physical Activity: www.kash.nih.gov/ra9jtyb      Tobacco use: www.quitwithusla.org

## 2022-03-04 NOTE — PROGRESS NOTES
"  Ladonna St presented for a  Medicare AWV and comprehensive Health Risk Assessment today. The following components were reviewed and updated:    · Medical history  · Family History  · Social history  · Allergies and Current Medications  · Health Risk Assessment  · Health Maintenance  · Care Team     ** See Completed Assessments for Annual Wellness Visit within the encounter summary.**     The following assessments were completed:  · Living Situation  · CAGE  · Depression Screening  · Timed Get Up and Go  · Whisper Test  · Cognitive Function Screening- N/A followed by neurology  · Nutrition Screening  · ADL Screening  · PAQ Screening    Vitals:    03/04/22 1232   BP: 128/66   BP Location: Left arm   Patient Position: Sitting   BP Method: Medium (Manual)   Pulse: 85   SpO2: 99%   Weight: 60.9 kg (134 lb 4.2 oz)   Height: 5' 8" (1.727 m)     Body mass index is 20.41 kg/m².  Physical Exam  Vitals reviewed.   Constitutional:       Appearance: Normal appearance.   Cardiovascular:      Rate and Rhythm: Normal rate and regular rhythm.      Pulses: Normal pulses.      Heart sounds: Normal heart sounds.   Pulmonary:      Effort: Pulmonary effort is normal.      Breath sounds: Normal breath sounds.   Neurological:      Mental Status: She is alert.       Diagnoses and health risks identified today and associated recommendations/orders:    1. Encounter for preventive health examination  Reviewed and discussed health maintenance.      2. Essential hypertension  Stable- continue current treatment and follow up routinely with PCP   No longer taking anti-hypertensives    3. Mixed hyperlipidemia  Stable- continue current treatment and follow up routinely with PCP     4. AV block, complete, post op complication of AV mary ablation  Stable- continue current treatment and follow up routinely with PCP and cardiology    5. S/P placement of cardiac pacemaker  Stable- continue current treatment and follow up routinely with PCP and " cardiology    6. Memory loss  Worsening- needs neurology follow up  Message sent to neurology for appt    Provided Ladonna with a 5-10 year written screening schedule and personal prevention plan. Recommendations were developed using the USPSTF age appropriate recommendations. Education, counseling, and referrals were provided as needed. After Visit Summary printed and given to patient which includes a list of additional screenings\tests needed.    I offered to discuss advanced care planning, including how to pick a person who would make decisions for you if you were unable to make them for yourself, called a health care power of , and what kind of decisions you might make such as use of life sustaining treatments such as ventilators and tube feeding when faced with a life limiting illness recorded on a living will that they will need to know. (How you want to be cared for as you near the end of your natural life)     X Patient is interested in learning more about how to make advanced directives.  I provided them paperwork and offered to discuss this with them.    Mary Wilkerson, NEREIDA

## 2022-03-04 NOTE — TELEPHONE ENCOUNTER
Pt seen by Nicky Pena NP. She will need a follow up with Dr. Mendez or Dr. Schneider in April.   Please call to schedule    She has also been referred to neuropsych testing twice and no one has called her. Can you help with that?

## 2022-03-21 NOTE — TELEPHONE ENCOUNTER
----- Message from Whit Vanegas sent at 3/21/2022 10:34 AM CDT -----  Regarding: Call back  Contact: [phone]  Type:  Patient Call Back    Who Called:pt  What this is regarding?:pt finished rx what are next steps  Would the patient rather a call back or a response via [employer]? Call back  Best Call Back Number:[phone]  Additional Information:     Advised to call back directly if there are further questions, or if these symptoms fail to improve as anticipated or worsen.

## 2022-03-22 NOTE — PROGRESS NOTES
THIS DOCUMENT WAS MADE IN PART WITH VOICE RECOGNITION SOFTWARE.  OCCASIONALLY THIS SOFTWARE WILL MISINTERPRET WORDS OR PHRASES.    Assessment and Plan:    1. Left hip pain  meloxicam (MOBIC) 15 MG tablet    Ambulatory referral/consult to Home Health    X-Ray Hip 2 or 3 views Left (with Pelvis when performed)    X-Ray Sacrum And Coccyx   2. Unsteady gait  Ambulatory referral/consult to Home Health   3. Post-menopausal  DXA Bone Density Spine And Hip   4. Drug therapy  CBC Auto Differential    Comprehensive Metabolic Panel    TSH    Urinalysis, Reflex to Urine Culture Urine, Clean Catch       Plan    Atraumatic, new onset hip pain  History of osteoporosis  Will check x-rays to rule out fracture, lytic lesion, treat with meloxicam for the time being, normal kidney function  Physical therapy for unsteady gait, left hip, lower back pain  Routine labs ordered today      ______________________________________________________________________  Subjective:    Chief Complaint:  Chief Complaint   Patient presents with    Hip Pain        HPI:  Ladonna is a 86 y.o. year old     Left Lower back pain  Duration 1 week   Tripped 2 weeks ago, only hurt right foot   No red flag symptoms   Pain interferes with walking  Does improve with anti-inflammatories    History of osteoporosis, hip osteoarthritis  Previously treated in 2020 with physical therapy which did improve discomfort    Past Medical History:  Past Medical History:   Diagnosis Date    Anxiety     Breast cancer 1986    mastectomy right    GERD (gastroesophageal reflux disease)     Hypertension     Osteoporosis, unspecified        Past Surgical History:  Past Surgical History:   Procedure Laterality Date    A-V CARDIAC PACEMAKER INSERTION N/A 9/9/2020    Procedure: INSERTION, CARDIAC PACEMAKER, DUAL CHAMBER - BIOTRONIK;  Surgeon: Rian Cramer MD;  [facility];  Service: Cardiology;  Laterality: N/A;    AUGMENTATION OF BREAST  1989    right only      BREAST BIOPSY Left     benign    BREAST SURGERY Right     implant repair after rupture    BREAST SURGERY Right     implant repair after rupture    CATARACT EXTRACTION W/  INTRAOCULAR LENS IMPLANT      bilateral    COLONOSCOPY  2011    polyp    COLONOSCOPY N/A 2020    Procedure: COLONOSCOPY;  Surgeon: Papo Akers MD;  [facility];  Service: Endoscopy;  Laterality: N/A;    dupuytrens      EYE SURGERY Bilateral     cataracts    MASTECTOMY Right 1986    TONSILLECTOMY         Family History:  Family History   Problem Relation Age of Onset    Cancer Maternal Aunt         colon    Breast cancer Maternal Aunt 50    Cancer Paternal Uncle         colon    Arthritis Mother     Parkinsonism Father     No Known Problems Sister     Henoch-Schonlein purpura Daughter     No Known Problems Son     No Known Problems Sister     Cancer Maternal Aunt         breast       Social History:  Social History     Socioeconomic History    Marital status:    Tobacco Use    Smoking status: Former Smoker     Packs/day: 0.10     Years: 24.00     Pack years: 2.40     Types: Cigarettes     Quit date: 1983     Years since quittin.8    Smokeless tobacco: Never Used    Tobacco comment: quit 30 years ago   Substance and Sexual Activity    Alcohol use: Yes     Comment: a few days a week    Drug use: No    Sexual activity: Not Currently     Social Determinants of Health     Financial Resource Strain: Low Risk     Difficulty of Paying Living Expenses: Not very hard   Food Insecurity: No Food Insecurity    Worried About Running Out of Food in the Last Year: Never true    Ran Out of Food in the Last Year: Never true   Transportation Needs: No Transportation Needs    Lack of Transportation (Medical): No    Lack of Transportation (Non-Medical): No   Stress: No Stress Concern Present    Feeling of Stress : Not at all   Social Connections: Moderately Isolated    Frequency of  Communication with Friends and Family: More than three times a week    Frequency of Social Gatherings with Friends and Family: More than three times a week    Attends Zoroastrianism Services: More than 4 times per year    Active Member of Clubs or Organizations: No    Attends Club or Organization Meetings: Never    Marital Status:    Housing Stability: Unknown    Number of Places Lived in the Last Year: 1    Unstable Housing in the Last Year: No       Medications:  Current Outpatient Medications on File Prior to Visit   Medication Sig Dispense Refill    amitriptyline (ELAVIL) 10 MG tablet TAKE 1 TABLET(10 MG) BY MOUTH EVERY NIGHT AS NEEDED FOR INSOMNIA 90 tablet 1    aspirin 325 MG tablet Take 325 mg by mouth as needed.       azelastine (ASTELIN) 137 mcg (0.1 %) nasal spray 1 spray (137 mcg total) by Nasal route 2 (two) times daily. 30 mL 2    folic acid/multivit-min/lutein (CENTRUM SILVER ORAL) Take 1 capsule by mouth once daily.       melatonin 1 mg Tab Take 3 mg by mouth as needed. 1 Tablet Oral Every evening      omeprazole (PRILOSEC) 20 MG capsule TAKE 1 CAPSULE(20 MG) BY MOUTH EVERY DAY 90 capsule 1     Current Facility-Administered Medications on File Prior to Visit   Medication Dose Route Frequency Provider Last Rate Last Admin    mupirocin 2 % ointment   Nasal Once Rian Cramer MD           Allergies:  Benadryl allergy decongestant and Sulfamethoxazole-trimethoprim    Immunizations:  Immunization History   Administered Date(s) Administered    COVID-19, MRNA, LN-S, PF (Pfizer) (Purple Cap) 01/06/2021, 01/27/2021, 12/18/2021    Influenza 10/03/2009, 10/04/2010, 11/10/2011    Influenza (FLUAD) - Quadrivalent - Adjuvanted - PF *Preferred* (65+) 10/07/2020    Influenza - High Dose - PF (65 years and older) 10/14/2015, 10/12/2016, 09/28/2017, 10/06/2018, 10/23/2019    Influenza - Trivalent (ADULT) 10/03/2009, 10/04/2010, 11/10/2011, 10/08/2013    Pneumococcal Conjugate - 13 Valent  "10/12/2016    Pneumococcal Polysaccharide - 23 Valent 05/15/2018    Zoster Recombinant 03/04/2022       Review of Systems:  Review of Systems   All other systems reviewed and are negative.      Objective:    Vitals:  Vitals:    03/22/22 0850   BP: 116/72   Pulse: 68   Weight: 59.4 kg (130 lb 15.3 oz)   Height: 5' 8" (1.727 m)   PainSc:   6   PainLoc: Hip       Physical Exam  Vitals reviewed.   Constitutional:       General: She is not in acute distress.     Appearance: She is well-developed.   HENT:      Head: Normocephalic and atraumatic.   Pulmonary:      Effort: Pulmonary effort is normal. No respiratory distress.   Musculoskeletal:      Cervical back: Normal range of motion.        Back:    Psychiatric:         Behavior: Behavior normal.         Thought Content: Thought content normal.         Judgment: Judgment normal.             Grzegorz Gutierrez MD  Family Medicine      "

## 2022-03-23 NOTE — TELEPHONE ENCOUNTER
----- Message from Grzegorz Gutierrez MD sent at 3/23/2022  7:20 AM CDT -----  Call patient, let her daughter know that her calcium is a little bit elevated.  I would like repeat lab work before her next appointment to investigate., schedule at their convenience, nonfasting  Dr. Gutierrez

## 2022-04-27 NOTE — TELEPHONE ENCOUNTER
Pt is being referred for Neuropsych testing. Please contact the pt's daughter to schedule. Thank you!

## 2022-04-27 NOTE — PROGRESS NOTES
"Date of service:  4/27/2022    Chief complaint:  Memory Loss    History of present illness:  The patient is a 86 y.o. female seen previously by Nicky Pena for memory loss.  This is my first time seeing her.  Her family reports continued decline.  She did have a fall with a fractured sacrum, which she is recovering from.  She has been living in her home with 24 hour supervision.  Her daughter reports that this has become increasingly difficult over time.  In particular, the patient has had issues around mood recently.  Of note, the patient's mood/behavior seemed to improve on Buspar during her hospitalization.    HPI (from Nicky Pena):  "Ladonna St is a right handed 85 y.o. female.   Patient is here today for memory loss. She is accompanied by her daughter. When asked, patient doesn't feel like her memory loss is significant but daughter disagrees. Patient struggles with dates and times. Patient will often look for her spouse who has passed away in February 2017. She also frequently refers to him as if he never passed away. Her memories of events past is different from what the family recalls. Daughter states today she forgot she had eggs boiling on the stove and they exploded. She lives alone but kids live very close.   Patient's highest level of education completed was highschool and 1 year of college. 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 The onset of memory issues began around 2016. She struggles more short term memory. Daughter states that patient has gotten really mean and hurtful recently but believes it was likely due to a stressful event.  Patient did suffer depression after her spouse passed away. Now, it comes and goes. There are good days and bad days. There is no suicidal ideation. She denies hallucinations. She reports that her sleep is "fine".  She denies sleeping a lot during the day. Daughter believes she struggles with executive function. Her " "daughter is managing her finances and patient is managing her own medications th oversight from her daughter. There are no problems with hygiene or ADLs. There are no problems with word finding or object recognition. She denies urinary incont or recent falls. She is still driving but very short distances and denies recent MVAs. She did get lost driving around the city about 3 years ago but this was not a place she frequented.  During the day she likes to do house chores, yard work, and reading."    Past Medical History:   Diagnosis Date    Anxiety     Breast cancer 1986    mastectomy right    GERD (gastroesophageal reflux disease)     Hypertension     Osteoporosis, unspecified        Past Surgical History:   Procedure Laterality Date    A-V CARDIAC PACEMAKER INSERTION N/A 9/9/2020    Procedure: INSERTION, CARDIAC PACEMAKER, DUAL CHAMBER - BIOTRONIK;  Surgeon: Rian Cramer MD;  [facility] CATH;  Service: Cardiology;  Laterality: N/A;    AUGMENTATION OF BREAST  1989    right only     BREAST BIOPSY Left 1987    benign    BREAST SURGERY Right 1989    implant repair after rupture    BREAST SURGERY Right     implant repair after rupture    CATARACT EXTRACTION W/  INTRAOCULAR LENS IMPLANT      bilateral    COLONOSCOPY  2011    polyp    COLONOSCOPY N/A 1/28/2020    Procedure: COLONOSCOPY;  Surgeon: Papo Akers MD;  Location: [facility] ENDO;  Service: Endoscopy;  Laterality: N/A;    dupuytrens      EYE SURGERY Bilateral     cataracts    MASTECTOMY Right 1986    TONSILLECTOMY         Family History   Problem Relation Age of Onset    Cancer Maternal Aunt         colon    Breast cancer Maternal Aunt 50    Cancer Paternal Uncle         colon    Arthritis Mother     Parkinsonism Father     No Known Problems Sister     Henoch-Schonlein purpura Daughter     No Known Problems Son     No Known Problems Sister     Cancer Maternal Aunt         breast       Social History     Socioeconomic " History    Marital status:    Tobacco Use    Smoking status: Former Smoker     Packs/day: 0.10     Years: 24.00     Pack years: 2.40     Types: Cigarettes     Quit date: 1983     Years since quittin.9    Smokeless tobacco: Never Used    Tobacco comment: quit 30 years ago   Substance and Sexual Activity    Alcohol use: Yes     Comment: a few days a week    Drug use: No    Sexual activity: Not Currently     Social Determinants of Health     Financial Resource Strain: Low Risk     Difficulty of Paying Living Expenses: Not very hard   Food Insecurity: No Food Insecurity    Worried About Running Out of Food in the Last Year: Never true    Ran Out of Food in the Last Year: Never true   Transportation Needs: No Transportation Needs    Lack of Transportation (Medical): No    Lack of Transportation (Non-Medical): No   Stress: No Stress Concern Present    Feeling of Stress : Not at all   Social Connections: Moderately Isolated    Frequency of Communication with Friends and Family: More than three times a week    Frequency of Social Gatherings with Friends and Family: More than three times a week    Attends Zoroastrian Services: More than 4 times per year    Active Member of Clubs or Organizations: No    Attends Club or Organization Meetings: Never    Marital Status:    Housing Stability: Unknown    Number of Places Lived in the Last Year: 1    Unstable Housing in the Last Year: No        Review of patient's allergies indicates:   Allergen Reactions    Benadryl allergy decongestant      hyperactive    Sulfamethoxazole-trimethoprim Rash       Review of Systems  The patient's ROS is noncontributory except as noted above.     Physical exam:  /69 (BP Location: Left arm, Patient Position: Sitting, BP Method: Medium (Automatic))   Pulse 78   Resp 18   Wt 57.3 kg (126 lb 6.9 oz)   BMI 18.67 kg/m²   General: Well developed, well nourished.  No acute distress.  ENT: Mucus membranes  "moist.  Atraumatic external nose and ears.  Lymphatic: No apparent lymphadenopathy.  Cardiovascular: Regular rate and rhythm.  Pulmonary: No increased work of breathing.  Abdomen/GI: No guarding.  Musculoskeletal: No obvious joint deformities, moves all extremities well.    Neurological exam:  Mental status: Awake and alert.  Oriented x3.  Speech fluent.  Recent and remote memory appear to be limited.  Fund of knowledge limited.  MMSE = 21/30.  Cranial nerves: Pupils equal round and reactive to light, extraocular movements intact, facial strength and sensation intact bilaterally, palate and tongue midline, hearing grossly intact bilaterally.  Motor: THOMPSON spontaneously.  Sensation: Intact to light touch and temperature bilaterally.  Coordination: No gross ataxia  Gait: Used wheelchair in clinic.      Data base:    CT head (4/21):  "No acute intracranial abnormality.  Moderate cerebral involutional change and mild white matter disease."    Neuropsychological testing:  NA      MMSE 4/27/2022   What is the (year), (season), (date), (day), (month)? 1   Where are we (state), (country), (town or city), (hospital), (floor)? 5   Name 3 common objects (eg. "apple", "table", "rocco"). Take 1 second to say each. Then ask the patient to repeat all 3. Give 1 point for each correct answer. Then repeat them until he/she learns all 3. Count trials and record. 3   Serial 7's backwards. Stop after 5 answers. (100,93,86,79,72) or alternatively  spell "WORLD" backwards. (D..L..R..O..W). The score is the number of letters in correct order. 5   Ask for the 3 common objects named earlier in the exam. Give 1 point for each correct answer. 0   Name a "pencil" and "watch." 2   Repeat the following: "No ifs, ands, or buts." 1   Follow a 3-stage command: "Take a paper in your right hand, fold it in half, & put it on the floor." 2   Read and obey the following: (see paper exam) 1   Write a sentence. 1   Copy the following design: (see paper exam) " 0   Total MMSE Score 21   Some recent data might be hidden         Caregiver name: Nicole   Relationship to the patient: daughter  Does the patient have a living will? yes  Does the patient have a designated healthcare POA? yes  Does the patient have a designated general POA? yes    Have educational materials/resources been provided? yes      Activities of Daily Living    Bathing: Needs assistance   Dressing: Independent  Grooming: Independent  Mouth Care: Independent  Toileting: Independent  Transferring Bed/Chair: Needs Help  Walking: Needs Help  Climbing: Needs Help  Eating: Independent      Instrumental Activities of Daily Living    Shopping: Dependent  Cooking: Dependent  Managing Medications: Dependent  Using the phone and looking up numbers: Independent  Doing Housework: Needs Help  Doing Laundry: Needs Help  Driving or using public transportation: Dependent  Managing finances: Dependent    Functional Assessment Staging  4   Decreased ability to perform complex tasks, e.g. planning dinner for guests, handling personal finances (such as forgetting to pay bills), difficulty marketing, etc.*         Depression Patient Health Questionnaire 4/27/2022 3/4/2022 3/2/2021 1/31/2020 3/21/2019 5/15/2018 5/15/2018   Over the last two weeks how often have you been bothered by little interest or pleasure in doing things 1 0 0 1 1 2 1   Over the last two weeks how often have you been bothered by feeling down, depressed or hopeless 1 0 0 1 1 2 1   PHQ-2 Total Score 2 0 0 2 2 4 2   Over the last two weeks how often have you been bothered by trouble falling or staying asleep, or sleeping too much - - - - - 1 -   Over the last two weeks how often have you been bothered by feeling tired or having little energy - - - - - 1 -   Over the last two weeks how often have you been bothered by a poor appetite or overeating - - - - - 0 -   Over the last two weeks how often have you been bothered by feeling bad about yourself - or that you  are a failure or have let yourself or your family down - - - - - 0 -   Over the last two weeks how often have you been bothered by trouble concentrating on things, such as reading the newspaper or watching television - - - - - 1 -   Over the last two weeks how often have you been bothered by moving or speaking so slowly that other people could have noticed. Or the opposite - being so fidgety or restless that you have been moving around a lot more than usual. - - - - - 0 -   Over the last two weeks how often have you been bothered by thoughts that you would be better off dead, or of hurting yourself - - - - - 0 -   If you checked off any problems, how difficult have these problems made it for you to do your work, take care of things at home or get along with other people? - - - - - Not difficult at all -   Total Score - - - - - 7 -          Assessment and plan:  The patient is a 86 y.o. female referred for evaluation of memory loss. I feel that the differential diagnosis includes degenerative causes of memory loss including AD and FTD. I think a reasonable workup would feature serologies, MRI of the brain (if pacemaker is compatible), and neuropsychological testing.  I will check an EKG on her.  Provided that does not show a prolonged QTc, we will start Aricept.  She may continue on her Buspar.  Should further interventions for mood/anxiety be needed, we will refer to psychiatry.  She is to work with her PCP on vascular health, including BP management, lipid profile optimization, glycemic monitoring, diet, exercise, and so forth.      Cognition and function were assessed and the patient's functional assessment staging test (FAST) score is 4. Patient is felt to not have decision making capacity. PHQ-2 score was 2. Medications were reconciled and reviewed for high-risk medications. The patient's behavior and psychiatric health were reviewed and addressed. The patient and family were counseled on safety in the home and  operation of vehicles. Discussed caregiver needs and social support. Advance Care Plan was reviewed. Written care plan and support information provided to the patient or caregiver and information was provided.

## 2022-05-02 NOTE — TELEPHONE ENCOUNTER
Spoke to pt's daughter, Nicole--informed her of message per Dr. Mendez and not to start Aricept. She states she did already  the medication but will not start pt on it. Advised her to give us a call if she needs anything further. She v/u.

## 2022-05-02 NOTE — TELEPHONE ENCOUNTER
Pt states Buspirone is 5mg BID not 10mg BID. Requesting another Rx sent to pharmacy. Pt had EKG done on 4/28/22--wanting to know if okay to start donepezil? Please advise.

## 2022-05-18 NOTE — TELEPHONE ENCOUNTER
----- Message from Shobha Schwartz sent at 5/18/2022  2:39 PM CDT -----  Contact: Senthil [phone]  Type: Needs Medical Advice  Who Called:  [facility]     Best Call Back Number: [phone]    Additional Information:Recertified for Home Health 8/10 for pain. Senthil calling to see if you can give pt something for pain        [id number] [place] [address] & [address] [address]  Phone: [phone] Fax: [phone]

## 2022-05-18 NOTE — TELEPHONE ENCOUNTER
Spoke with Senthil. States that patient was unable to do any PT due to pain. Requesting something to help with pain. Meloxicam is not helping with back pain.    Pt has appt tomorrow with PCP    Please advise.

## 2022-05-19 NOTE — TELEPHONE ENCOUNTER
Sent tramadol to take every 8 hours as needed  Needs to see pmr or ortho if pain not controlled with that regimen

## 2022-05-19 NOTE — TELEPHONE ENCOUNTER
----- Message from Keisha Mccain sent at 5/19/2022  9:54 AM CDT -----  Regarding: advice  Contact: leighann  Type: Needs Medical Advice    Who Called:  keith ochsner RN  Symptoms (please be specific): n/a  How long has patient had these symptoms:  n/a  Pharmacy name and phone #:  n/a  Best Call Back Number: [phone]    Additional Information: called yesterday because pt was in severe pain, but nothing was done. Asking for a call back

## 2022-05-19 NOTE — PROGRESS NOTES
THIS DOCUMENT WAS MADE IN PART WITH VOICE RECOGNITION SOFTWARE.  OCCASIONALLY THIS SOFTWARE WILL MISINTERPRET WORDS OR PHRASES.    Assessment and Plan:    1. Impaired cognitive ability     2. Memory impairment of gradual onset     3. Acute bilateral back pain, unspecified back location  X-Ray Lumbar Spine AP And Lateral    X-Ray Thoracic Spine AP Lateral    tiZANidine (ZANAFLEX) 4 MG tablet   4. Insomnia secondary to anxiety  mirtazapine (REMERON) 15 MG tablet   5. Anxiety  mirtazapine (REMERON) 15 MG tablet     Compression fracture appreciated on x-ray.  Likely cause of her pain.  Add calcitonin nasal and Lidoderm patches for pain relief  Also Zanaflex as needed  Heating pad recommended    New medication Remeron for insomnia with associated anxiety relating to her dementia    Recommended MiraLax for bowel concerns.  Recommended redirecting in regards to suppository unless patient actually has constipation    Follow-up in 2 weeks for re-evaluation  ______________________________________________________________________  Subjective:    Chief Complaint:  Chief Complaint   Patient presents with    Fall    Back Pain    Follow-up        HPI:  Ladonna is a 86 y.o. year old     86-year-old female presents for hospital follow-up, complains of back pain and other concerns    Apparently stayed in [place] for a period of time after a fall in which she fractured her sacral bone.  She was then discharged to [facility] for rehab and eventually discharged home.  She was working with physical therapy at home and began having some significant thoracolumbar back pain over the last few days.  Patient unable to localize pain.  Has been using meloxicam which has been ineffective.  She does report 1 fall since the hospitalization.    Dementia concerns-has been having some anxiety, insomnia and depression associated with the dementia diagnosis.  Also exacerbated by her recent hospital stay and injury.  Daughter reports she has  also OCD about taking a suppository despite denying any constipation.    Past Medical History:  Past Medical History:   Diagnosis Date    Anxiety     Breast cancer 1986    mastectomy right    GERD (gastroesophageal reflux disease)     Hypertension     Osteoporosis, unspecified        Past Surgical History:  Past Surgical History:   Procedure Laterality Date    A-V CARDIAC PACEMAKER INSERTION N/A 2020    Procedure: INSERTION, CARDIAC PACEMAKER, DUAL CHAMBER - BIOTRONIK;  Surgeon: Rian Cramer MD;  Location: [facility] CATH;  Service: Cardiology;  Laterality: N/A;    AUGMENTATION OF BREAST      right only     BREAST BIOPSY Left 1987    benign    BREAST SURGERY Right     implant repair after rupture    BREAST SURGERY Right     implant repair after rupture    CATARACT EXTRACTION W/  INTRAOCULAR LENS IMPLANT      bilateral    COLONOSCOPY      polyp    COLONOSCOPY N/A 2020    Procedure: COLONOSCOPY;  Surgeon: Papo Akers MD;  Location: [facility] ENDO;  Service: Endoscopy;  Laterality: N/A;    dupuytrens      EYE SURGERY Bilateral     cataracts    MASTECTOMY Right     TONSILLECTOMY         Family History:  Family History   Problem Relation Age of Onset    Cancer Maternal Aunt         colon    Breast cancer Maternal Aunt 50    Cancer Paternal Uncle         colon    Arthritis Mother     Parkinsonism Father     No Known Problems Sister     Henoch-Schonlein purpura Daughter     No Known Problems Son     No Known Problems Sister     Cancer Maternal Aunt         breast       Social History:  Social History     Socioeconomic History    Marital status:    Tobacco Use    Smoking status: Former Smoker     Packs/day: 0.10     Years: 24.00     Pack years: 2.40     Types: Cigarettes     Quit date: 1983     Years since quittin.9    Smokeless tobacco: Never Used    Tobacco comment: quit 30 years ago   Substance and Sexual Activity    Alcohol use: Yes      Comment: a few days a week    Drug use: No    Sexual activity: Not Currently     Social Determinants of Health     Financial Resource Strain: Low Risk     Difficulty of Paying Living Expenses: Not very hard   Food Insecurity: No Food Insecurity    Worried About Running Out of Food in the Last Year: Never true    Ran Out of Food in the Last Year: Never true   Transportation Needs: No Transportation Needs    Lack of Transportation (Medical): No    Lack of Transportation (Non-Medical): No   Stress: No Stress Concern Present    Feeling of Stress : Not at all   Social Connections: Moderately Isolated    Frequency of Communication with Friends and Family: More than three times a week    Frequency of Social Gatherings with Friends and Family: More than three times a week    Attends Latter-day Services: More than 4 times per year    Active Member of Clubs or Organizations: No    Attends Club or Organization Meetings: Never    Marital Status:    Housing Stability: Unknown    Number of Places Lived in the Last Year: 1    Unstable Housing in the Last Year: No       Medications:  Current Outpatient Medications on File Prior to Visit   Medication Sig Dispense Refill    busPIRone (BUSPAR) 5 MG Tab Take 1 tablet (5 mg total) by mouth 2 (two) times daily. 180 tablet 1    folic acid/multivit-min/lutein (CENTRUM SILVER ORAL) Take 1 capsule by mouth once daily.       omeprazole (PRILOSEC) 20 MG capsule TAKE 1 CAPSULE(20 MG) BY MOUTH EVERY DAY 90 capsule 1    traMADoL (ULTRAM) 50 mg tablet Take 1 tablet (50 mg total) by mouth every 8 (eight) hours as needed for Pain. 90 tablet 0    azelastine (ASTELIN) 137 mcg (0.1 %) nasal spray 1 spray (137 mcg total) by Nasal route 2 (two) times daily. 30 mL 2    [DISCONTINUED] amitriptyline (ELAVIL) 10 MG tablet TAKE 1 TABLET(10 MG) BY MOUTH EVERY NIGHT AS NEEDED FOR INSOMNIA (Patient not taking: No sig reported) 90 tablet 1    [DISCONTINUED] aspirin 325 MG tablet Take  "325 mg by mouth as needed.       [DISCONTINUED] busPIRone (BUSPAR) 10 MG tablet Take 1 tablet (10 mg total) by mouth 2 (two) times daily. (Patient not taking: Reported on 5/19/2022) 180 tablet 1    [DISCONTINUED] busPIRone (BUSPAR) 5 MG Tab 2 (two) times a day.      [DISCONTINUED] donepeziL (ARICEPT) 10 MG tablet 1/2 tab PO daily x1 week, then 1 tab PO daily thereafter 30 tablet 2    [DISCONTINUED] melatonin 1 mg Tab Take 3 mg by mouth as needed. 1 Tablet Oral Every evening      [DISCONTINUED] meloxicam (MOBIC) 15 MG tablet Take 1 tablet (15 mg total) by mouth daily as needed for Pain. 60 tablet 0    [DISCONTINUED] varicella-zoster [id number], PF, (SHINGRIX, PF,) 50 mcg/0.5 mL injection Inject 0.5 mLs into the muscle once. for 1 dose 1 each 1     Current Facility-Administered Medications on File Prior to Visit   Medication Dose Route Frequency Provider Last Rate Last Admin    mupirocin 2 % ointment   Nasal Once Rian Cramer MD           Allergies:  Benadryl allergy decongestant and Sulfamethoxazole-trimethoprim    Immunizations:  Immunization History   Administered Date(s) Administered    COVID-19, MRNA, LN-S, PF (Pfizer) (Purple Cap) 01/06/2021, 01/27/2021, 12/18/2021    Influenza 10/03/2009, 10/04/2010, 11/10/2011    Influenza (FLUAD) - Quadrivalent - Adjuvanted - PF *Preferred* (65+) 10/07/2020    Influenza - High Dose - PF (65 years and older) 10/14/2015, 10/12/2016, 09/28/2017, 10/06/2018, 10/23/2019    Influenza - Trivalent (ADULT) 10/03/2009, 10/04/2010, 11/10/2011, 10/08/2013    Pneumococcal Conjugate - 13 Valent 10/12/2016    Pneumococcal Polysaccharide - 23 Valent 05/15/2018    Zoster Recombinant 03/04/2022       Review of Systems:  Review of Systems   All other systems reviewed and are negative.      Objective:    Vitals:  Vitals:    05/19/22 1304   Pulse: 86   Resp: (!) 22   Weight: 54.2 kg (119 lb 6.1 oz)   Height: 5' 9" (1.753 m)   PainSc: 10-Worst pain ever   PainLoc: Back "       Physical Exam  Vitals reviewed.   Constitutional:       General: She is not in acute distress.  HENT:      Head: Normocephalic and atraumatic.   Eyes:      Pupils: Pupils are equal, round, and reactive to light.   Cardiovascular:      Rate and Rhythm: Normal rate and regular rhythm.      Heart sounds: No murmur heard.    No friction rub.   Pulmonary:      Effort: Pulmonary effort is normal.      Breath sounds: Normal breath sounds.   Abdominal:      General: Bowel sounds are normal. There is no distension.      Palpations: Abdomen is soft.      Tenderness: There is no abdominal tenderness.   Musculoskeletal:      Cervical back: Neck supple.      Comments: Moderate kyphosis with diffuse thoracolumbar pain, hard to localized on exam   Skin:     General: Skin is warm and dry.      Findings: No rash.   Psychiatric:         Behavior: Behavior normal.             Grzegorz Gutierrez MD  Family Medicine

## 2022-05-31 NOTE — TELEPHONE ENCOUNTER
----- Message from Ish Grajeda sent at 5/31/2022  4:20 PM CDT -----  Type: Needs Medical Advice  Who Called:  [facility]    Best Call Back Number: call Daughter - Nicole [phone]  Additional Information: Sts that daughter advised her that since she has started taking mirtazapine (REMERON) 15 MG tablet pt has been very Hostile.  Would like a call.  Please advise == Thank you

## 2022-06-03 PROBLEM — R94.31 PROLONGED QT INTERVAL: Status: ACTIVE | Noted: 2022-01-01

## 2022-06-03 NOTE — PROGRESS NOTES
THIS DOCUMENT WAS MADE IN PART WITH VOICE RECOGNITION SOFTWARE.  OCCASIONALLY THIS SOFTWARE WILL MISINTERPRET WORDS OR PHRASES.    Assessment and Plan:    1. Compression fracture of thoracic vertebra, unspecified thoracic vertebral level, initial encounter     2. Insomnia secondary to anxiety     3. Prolonged QT interval     4. Agitation due to dementia  FLUoxetine 10 MG capsule        Sleeping well with trazodone    New medication fluoxetine for daytime agitation    Compression fracture symptoms greatly improved.  We did discuss the idea of doing a bone scan to look for other lesions in the event that this could be a metastatic issue.  We do suspect this is more likely related to a combination of osteoporosis and recent trauma.  We agreed that we will just monitor for other lesions in the future.  And can certainly consider doing a bone scan if indicated  ______________________________________________________________________  Subjective:    Chief Complaint:  Chief Complaint   Patient presents with    Follow-up     2 week follow up        HPI:  Ladonna is a 86 y.o. year old     Patient here to follow-up on a few different problems    Noted to have some compression fractures in her spine following a fall with severe pain following.  We did initiate intranasal calcitonin as well as lidocaine patches.  She does report symptoms fairly well improved.  Much more comfortable.  We also initiated Remeron for insomnia but this was causing some irritation, agitation.    In the interim, we changed it to trazodone which is working much better in she is sleeping throughout the night.    Daughter who is present at the visit does report some daytime agitation          Past Medical History:  Past Medical History:   Diagnosis Date    Anxiety     Breast cancer 1986    mastectomy right    GERD (gastroesophageal reflux disease)     Hypertension     Osteoporosis, unspecified        Past Surgical History:  Past Surgical History:    Procedure Laterality Date    A-V CARDIAC PACEMAKER INSERTION N/A 2020    Procedure: INSERTION, CARDIAC PACEMAKER, DUAL CHAMBER - BIOTRONIK;  Surgeon: Rian Cramer MD;  [facility]: [facility] CATH;  Service: Cardiology;  Laterality: N/A;    AUGMENTATION OF BREAST      right only     BREAST BIOPSY Left 1987    benign    BREAST SURGERY Right     implant repair after rupture    BREAST SURGERY Right     implant repair after rupture    CATARACT EXTRACTION W/  INTRAOCULAR LENS IMPLANT      bilateral    COLONOSCOPY      polyp    COLONOSCOPY N/A 2020    Procedure: COLONOSCOPY;  Surgeon: Papo Akers MD;  Location: [facility] ENDO;  Service: Endoscopy;  Laterality: N/A;    dupuytrens      EYE SURGERY Bilateral     cataracts    MASTECTOMY Right     TONSILLECTOMY         Family History:  Family History   Problem Relation Age of Onset    Cancer Maternal Aunt         colon    Breast cancer Maternal Aunt 50    Cancer Paternal Uncle         colon    Arthritis Mother     Parkinsonism Father     No Known Problems Sister     Henoch-Schonlein purpura Daughter     No Known Problems Son     No Known Problems Sister     Cancer Maternal Aunt         breast       Social History:  Social History     Socioeconomic History    Marital status:    Tobacco Use    Smoking status: Former Smoker     Packs/day: 0.10     Years: 24.00     Pack years: 2.40     Types: Cigarettes     Quit date: 1983     Years since quittin.0    Smokeless tobacco: Never Used    Tobacco comment: quit 30 years ago   Substance and Sexual Activity    Alcohol use: Yes     Comment: a few days a week    Drug use: No    Sexual activity: Not Currently     Social Determinants of Health     Financial Resource Strain: Low Risk     Difficulty of Paying Living Expenses: Not very hard   Food Insecurity: No Food Insecurity    Worried About Running Out of Food in the Last Year: Never true    Ran Out of Food in  the Last Year: Never true   Transportation Needs: No Transportation Needs    Lack of Transportation (Medical): No    Lack of Transportation (Non-Medical): No   Stress: No Stress Concern Present    Feeling of Stress : Not at all   Social Connections: Moderately Isolated    Frequency of Communication with Friends and Family: More than three times a week    Frequency of Social Gatherings with Friends and Family: More than three times a week    Attends Oriental orthodox Services: More than 4 times per year    Active Member of Clubs or Organizations: No    Attends Club or Organization Meetings: Never    Marital Status:    Housing Stability: Unknown    Number of Places Lived in the Last Year: 1    Unstable Housing in the Last Year: No       Medications:  Current Outpatient Medications on File Prior to Visit   Medication Sig Dispense Refill    busPIRone (BUSPAR) 5 MG Tab Take 1 tablet (5 mg total) by mouth 2 (two) times daily. 180 tablet 1    calcitonin, salmon, (FORTICAL) 200 unit/actuation nasal spray One spray in 1 nostril daily 3.7 mL 0    folic acid/multivit-min/lutein (CENTRUM SILVER ORAL) Take 1 capsule by mouth once daily.       LIDOcaine (LIDODERM) 5 % Place 1 patch onto the skin once daily. Remove & Discard patch within 12 hours or as directed by MD 30 patch 3    omeprazole (PRILOSEC) 20 MG capsule TAKE 1 CAPSULE(20 MG) BY MOUTH EVERY DAY 90 capsule 1    traZODone (DESYREL) 50 MG tablet Take 1 tablet (50 mg total) by mouth every evening. 30 tablet 11     Current Facility-Administered Medications on File Prior to Visit   Medication Dose Route Frequency Provider Last Rate Last Admin    mupirocin 2 % ointment   Nasal Once Rian Cramer MD           Allergies:  Benadryl allergy decongestant and Sulfamethoxazole-trimethoprim    Immunizations:  Immunization History   Administered Date(s) Administered    COVID-19, MRNA, LN-S, PF (Pfizer) (Purple Cap) 01/06/2021, 01/27/2021, 12/18/2021     "Influenza 10/03/2009, 10/04/2010, 11/10/2011    Influenza (FLUAD) - Quadrivalent - Adjuvanted - PF *Preferred* (65+) 10/07/2020    Influenza - High Dose - PF (65 years and older) 10/14/2015, 10/12/2016, 09/28/2017, 10/06/2018, 10/23/2019    Influenza - Trivalent (ADULT) 10/03/2009, 10/04/2010, 11/10/2011, 10/08/2013    Pneumococcal Conjugate - 13 Valent 10/12/2016    Pneumococcal Polysaccharide - 23 Valent 05/15/2018    Zoster Recombinant 03/04/2022       Review of Systems:  Review of Systems   All other systems reviewed and are negative.      Objective:    Vitals:  Vitals:    06/03/22 1540   BP: 118/64   Pulse: 74   Resp: 18   SpO2: 96%   Weight: 54.6 kg (120 lb 7.7 oz)   Height: 5' 9" (1.753 m)   PainSc:   8   PainLoc: Back       Physical Exam  Vitals reviewed.   Constitutional:       General: She is not in acute distress.     Appearance: She is well-developed.   HENT:      Head: Normocephalic and atraumatic.   Pulmonary:      Effort: Pulmonary effort is normal. No respiratory distress.   Musculoskeletal:      Cervical back: Normal range of motion.   Psychiatric:         Behavior: Behavior normal.         Thought Content: Thought content normal.         Judgment: Judgment normal.             Grzegorz Gutierrez MD  Family Medicine      "

## 2022-06-19 NOTE — TELEPHONE ENCOUNTER
"[place]    PCP:  Dr. Grzegorz Gutierrez    Spoke with Dtr, Nicole Yanes.  Dtr reports dementia.  Lives alone and has sitters that stay with her.  Dtr reports dementia is getting "out of control".  Dtr reports insomnia the last 3 nights, agitated, anxious, paranoid, and hallucinations.  Dtr has POA.  Dtr reports that pt has "crazy ideas in her head about the sitters" and even if the sitter is removed that she still holds onto those ideas.  Per protocol, care advised is go to ED now.  Instructed to call for questions/concerns.  VU.    Reason for Disposition   Severe agitation or behavior problem (e.g., patient endangering self or others)    Additional Information   Negative: [1] Difficult to awaken or acting confused (e.g., disoriented, slurred speech) AND [2] present now AND [3] new-onset AND [4] has diabetes (diabetes mellitus)   Negative: [1] Difficult to awaken or acting confused (e.g., disoriented, slurred speech) AND [2] present now AND [3] new-onset   Negative: [1] Weakness of the face, arm, or leg on one side of the body AND [2] new-onset   Negative: [1] Numbness of the face, arm, or leg on one side of the body AND [2] new-onset   Negative: [1] Loss of speech or garbled speech AND [2] new-onset   Negative: Shock suspected (e.g., cold/pale/clammy skin, too weak to stand, low BP, rapid pulse)   Negative: Sounds like a life-threatening emergency to the triager    Protocols used: DEMENTIA SYMPTOMS AND [id number]      "

## 2022-06-21 NOTE — TELEPHONE ENCOUNTER
----- Message from Sarah Khlail sent at 6/21/2022  3:27 PM CDT -----  Who Called: [facility]    What is the reqeust in detail: Requesting UA order be approved for patient's foul odor. Please advise.     Can the clinic reply by [facility]? No    Best Call Back Number: [phone]    Additional Information:

## 2022-06-21 NOTE — TELEPHONE ENCOUNTER
Spoke with HH and gave verbal order for urine testing due to foul odor. Camelia verbalized understanding.

## 2022-07-08 PROBLEM — F03.911 AGITATION DUE TO DEMENTIA: Status: ACTIVE | Noted: 2022-01-01

## 2022-07-08 PROBLEM — F03.918 DEMENTIA WITH BEHAVIORAL DISTURBANCE: Status: ACTIVE | Noted: 2022-01-01

## 2022-07-08 NOTE — PROGRESS NOTES
THIS DOCUMENT WAS MADE IN PART WITH VOICE RECOGNITION SOFTWARE.  OCCASIONALLY THIS SOFTWARE WILL MISINTERPRET WORDS OR PHRASES.    Assessment and Plan:    1. Gastroesophageal reflux disease without esophagitis     2. Anxiety     3. Agitation due to dementia  OLANZapine (ZYPREXA) 5 MG tablet   4. Dementia with behavioral disturbance, unspecified dementia type     5. Compression fracture of thoracic vertebra, unspecified thoracic vertebral level, initial encounter  calcitonin, salmon, (FORTICAL) 200 unit/actuation nasal spray       PLAN    Increased dose of Zyprexa from 2.5-5 mg  Follow-up in 4-6 weeks with virtual visit    Discontinue omeprazole, patient asymptomatic off medicine x1 week    One more month of calcitonin nasal spray for compression fracture      ______________________________________________________________________  Subjective:    Chief Complaint:  Dementia/agitation    HPI:  Ladonna is a 86 y.o. year old     The patient location is: [place]    Visit type: Audiovisual    Face to Face time with patient: 15 min  20 minutes of total time spent on the encounter, which includes face to face time and non-face to face time preparing to see the patient (eg, review of tests), Obtaining and/or reviewing separately obtained history, Documenting clinical information in the electronic or other health record, Independently interpreting results (not separately reported) and communicating results to the patient/family/caregiver, or Care coordination (not separately reported).     Each patient to whom he or she provides medical services by telemedicine is:  (1) informed of the relationship between the physician and patient and the respective role of any other health care provider with respect to management of the patient; and (2) notified that he or she may decline to receive medical services by telemedicine and may withdraw from such care at any time.    Notes:     Interval history    06/24/2022-Keflex sent for acute  "infective cystitis    Today    Here to follow-up on dementia with behavioral disturbances including insomnia and agitation  We did add fluoxetine at prior visit and Zyprexa in the interim to use as needed    Daughter reports patient taking zyprexa in AM/ Trazodone at night.   Fluoxetine d/c'ed due to agitation  Daughter reports paranoia / delusion / agitation     Dyspnea  "old issue"   Associated with agitation, moving around.       Chronic issues below----------------------------    Dementia with behavioral disturbances including insomnia and agitation  Rx-trazodone 50 mg, fluoxetine 10 mg, Zyprexa 2.5 mg b.i.d. p.r.n.    GERD  Rx-omeprazole 20 mg    History of right side breasts cancer  Partial mastectomy in 1986.          Past Medical History:  Past Medical History:   Diagnosis Date    Anxiety     Breast cancer 1986    mastectomy right    GERD (gastroesophageal reflux disease)     Hypertension     Osteoporosis, unspecified        Past Surgical History:  Past Surgical History:   Procedure Laterality Date    A-V CARDIAC PACEMAKER INSERTION N/A 9/9/2020    Procedure: INSERTION, CARDIAC PACEMAKER, DUAL CHAMBER - BIOTRONIK;  Surgeon: Rian Cramer MD;  Location: [facility] CATH;  Service: Cardiology;  Laterality: N/A;    AUGMENTATION OF BREAST  1989    right only     BREAST BIOPSY Left 1987    benign    BREAST SURGERY Right 1989    implant repair after rupture    BREAST SURGERY Right     implant repair after rupture    CATARACT EXTRACTION W/  INTRAOCULAR LENS IMPLANT      bilateral    COLONOSCOPY  2011    polyp    COLONOSCOPY N/A 1/28/2020    Procedure: COLONOSCOPY;  Surgeon: Papo Akers MD;  Location: [facility] ENDO;  Service: Endoscopy;  Laterality: N/A;    dupuytrens      EYE SURGERY Bilateral     cataracts    MASTECTOMY Right 1986    TONSILLECTOMY         Family History:  Family History   Problem Relation Age of Onset    Cancer Maternal Aunt         colon    Breast cancer Maternal Aunt 50 "    Cancer Paternal Uncle         colon    Arthritis Mother     Parkinsonism Father     No Known Problems Sister     Henoch-Schonlein purpura Daughter     No Known Problems Son     No Known Problems Sister     Cancer Maternal Aunt         breast       Social History:  Social History     Socioeconomic History    Marital status:    Tobacco Use    Smoking status: Former Smoker     Packs/day: 0.10     Years: 24.00     Pack years: 2.40     Types: Cigarettes     Quit date: 1983     Years since quittin.1    Smokeless tobacco: Never Used    Tobacco comment: quit 30 years ago   Substance and Sexual Activity    Alcohol use: Yes     Comment: a few days a week    Drug use: No    Sexual activity: Not Currently     Social Determinants of Health     Financial Resource Strain: Low Risk     Difficulty of Paying Living Expenses: Not very hard   Food Insecurity: No Food Insecurity    Worried About Running Out of Food in the Last Year: Never true    Ran Out of Food in the Last Year: Never true   Transportation Needs: No Transportation Needs    Lack of Transportation (Medical): No    Lack of Transportation (Non-Medical): No   Stress: No Stress Concern Present    Feeling of Stress : Not at all   Social Connections: Moderately Isolated    Frequency of Communication with Friends and Family: More than three times a week    Frequency of Social Gatherings with Friends and Family: More than three times a week    Attends Congregational Services: More than 4 times per year    Active Member of Clubs or Organizations: No    Attends Club or Organization Meetings: Never    Marital Status:    Housing Stability: Unknown    Number of Places Lived in the Last Year: 1    Unstable Housing in the Last Year: No       Medications:  Current Outpatient Medications on File Prior to Visit   Medication Sig Dispense Refill    calcitonin, salmon, (FORTICAL) 200 unit/actuation nasal spray One spray in 1 nostril daily  3.7 mL 0    cephALEXin (KEFLEX) 500 MG capsule Take 1 capsule (500 mg total) by mouth every 12 (twelve) hours. 10 capsule 0    FLUoxetine 10 MG capsule Take 1 capsule (10 mg total) by mouth once daily. 30 capsule 11    folic acid/multivit-min/lutein (CENTRUM SILVER ORAL) Take 1 capsule by mouth once daily.       LIDOcaine (LIDODERM) 5 % Place 1 patch onto the skin once daily. Remove & Discard patch within 12 hours or as directed by MD 30 patch 3    OLANZapine (ZYPREXA) 2.5 MG tablet Take 1 tablet (2.5 mg total) by mouth 2 (two) times daily as needed (Agitation). 60 tablet 11    omeprazole (PRILOSEC) 20 MG capsule TAKE 1 CAPSULE(20 MG) BY MOUTH EVERY DAY 90 capsule 1    traZODone (DESYREL) 50 MG tablet Take 1 tablet (50 mg total) by mouth every evening. 30 tablet 11     Current Facility-Administered Medications on File Prior to Visit   Medication Dose Route Frequency Provider Last Rate Last Admin    mupirocin 2 % ointment   Nasal Once Rian Cramer MD           Allergies:  Benadryl allergy decongestant, Mirtazapine, and Sulfamethoxazole-trimethoprim    Immunizations:  Immunization History   Administered Date(s) Administered    COVID-19, MRNA, LN-S, PF (Pfizer) (Purple Cap) 01/06/2021, 01/27/2021, 12/18/2021    Influenza 10/03/2009, 10/04/2010, 11/10/2011    Influenza (FLUAD) - Quadrivalent - Adjuvanted - PF *Preferred* (65+) 10/07/2020    Influenza - High Dose - PF (65 years and older) 10/14/2015, 10/12/2016, 09/28/2017, 10/06/2018, 10/23/2019    Influenza - Trivalent (ADULT) 10/03/2009, 10/04/2010, 11/10/2011, 10/08/2013    Pneumococcal Conjugate - 13 Valent 10/12/2016    Pneumococcal Polysaccharide - 23 Valent 05/15/2018    Zoster Recombinant 03/04/2022       Review of Systems:  Review of Systems   Constitutional: Negative for activity change and unexpected weight change.   HENT: Negative for hearing loss, rhinorrhea and trouble swallowing.    Eyes: Negative for discharge and visual  disturbance.   Respiratory: Negative for chest tightness and wheezing.    Cardiovascular: Negative for chest pain and palpitations.   Gastrointestinal: Negative for blood in stool, constipation, diarrhea and vomiting.   Endocrine: Negative for polydipsia and polyuria.   Genitourinary: Negative for difficulty urinating, dysuria, hematuria and menstrual problem.   Musculoskeletal: Negative for arthralgias, joint swelling and neck pain.   Neurological: Negative for weakness and headaches.   Psychiatric/Behavioral: Positive for confusion and dysphoric mood.   All other systems reviewed and are negative.      Objective:    Vitals:  There were no vitals filed for this visit.    Physical Exam  Vitals reviewed.   Constitutional:       General: She is not in acute distress.  HENT:      Head: Normocephalic and atraumatic.   Eyes:      Pupils: Pupils are equal, round, and reactive to light.   Cardiovascular:      Rate and Rhythm: Normal rate and regular rhythm.      Heart sounds: No murmur heard.    No friction rub.   Pulmonary:      Effort: Pulmonary effort is normal.      Breath sounds: Normal breath sounds.   Abdominal:      General: Bowel sounds are normal. There is no distension.      Palpations: Abdomen is soft.      Tenderness: There is no abdominal tenderness.   Musculoskeletal:      Cervical back: Neck supple.   Skin:     General: Skin is warm and dry.      Findings: No rash.   Psychiatric:         Behavior: Behavior normal.             Grzegorz Gutierrez MD  Family Medicine

## 2022-09-12 NOTE — PROGRESS NOTES
Osteo Improvement-[facility]    Non-compliant report chart audits for OSTEOPOROSIS SCREENING. Chart review completed for HM test overdue (mammograms, Colonoscopies, pap smears, DM labs, and/or EYE EXAMs)       DIS reviewed for test needed.  Dexa Scan      Outreach to patient in reference to dexa scan    RE:  Patient needs to schedule dexa scan.    Scheduled 9.28.22

## 2022-10-01 NOTE — TELEPHONE ENCOUNTER
OOC NT return call -  Pt daughter reports pt sitter reports pt c/o burning with urination and frequent urination. - Urination pain protocol followed and advised  to be seen by provider with in 4 hours. OAC and offered and accepted. Encounter routed to PCP      Reason for Disposition   Side (flank) or lower back pain present    Additional Information   Negative: Shock suspected (e.g., cold/pale/clammy skin, too weak to stand, low BP, rapid pulse)   Negative: Sounds like a life-threatening emergency to the triager   Negative: Followed a genital area injury (e.g., vagina, vulva)   Negative: Taking antibiotic for urinary tract infection (UTI)   Negative: Pregnant   Negative: Postpartum (from 0 to 6 weeks after delivery)   Negative: [1] Unable to urinate (or only a few drops) > 4 hours AND [2] bladder feels very full (e.g., palpable bladder or strong urge to urinate)   Negative: Vomiting   Negative: Patient sounds very sick or weak to the triager   Negative: [1] SEVERE pain with urination (e.g., excruciating) AND [2] not improved after 2 hours of pain medicine and Sitz bath   Negative: Fever > 100.4 F (38.0 C)    Protocols used: Urination Pain - Female-A-

## 2022-10-07 NOTE — PROGRESS NOTES
THIS DOCUMENT WAS MADE IN PART WITH VOICE RECOGNITION SOFTWARE.  OCCASIONALLY THIS SOFTWARE WILL MISINTERPRET WORDS OR PHRASES.    Assessment and Plan:    1. Memory impairment of gradual onset  Ambulatory referral/consult to Home Health      2. Agitation due to dementia  Ambulatory referral/consult to Home Health          PLAN    Increase trazodone to 100 mg for a few nights, consider 150 mg if 100 ineffective     Consider new SSRI for uncontrolled anxiety symptoms during the daytime at next week's visit     Will get handicap placard, wheelchair order for appointment next week     Ordered home health for assistance with patient with dementia another complications from it         ______________________________________________________________________  Subjective:    Chief Complaint:  Sleep difficulties      HPI:  Ladonna is a 86 y.o. year old     The patient location is: [place]    Visit type: Audiovisual    Face to Face time with patient: 15 min  20 minutes of total time spent on the encounter, which includes face to face time and non-face to face time preparing to see the patient (eg, review of tests), Obtaining and/or reviewing separately obtained history, Documenting clinical information in the electronic or other health record, Independently interpreting results (not separately reported) and communicating results to the patient/family/caregiver, or Care coordination (not separately reported).     Each patient to whom he or she provides medical services by telemedicine is:  (1) informed of the relationship between the physician and patient and the respective role of any other health care provider with respect to management of the patient; and (2) notified that he or she may decline to receive medical services by telemedicine and may withdraw from such care at any time.    Notes:     Patient here to follow-up sleep difficulties in the setting of dementia   Discontinued fluoxetine at prior visit secondary to  agitation, increase Hiprex at a 5 mg b.i.d. p.r.n.  Having great trouble sleeping     Wanting handicap placard and wheel chair      Dementia with behavioral disturbances including insomnia and agitation  Rx-trazodone 50 mg, Zyprexa 5 mg b.i.d. p.r.n.  Prev med : fluoxetine (agitation) , Remeron (agitation)     GERD  Previous Rx-omeprazole 20 mg (effective)     History of right side breasts cancer  Partial mastectomy in 1986.           Past Medical History:  Past Medical History:   Diagnosis Date    Anxiety     Breast cancer 1986    mastectomy right    GERD (gastroesophageal reflux disease)     Hypertension     Osteoporosis, unspecified        Past Surgical History:  Past Surgical History:   Procedure Laterality Date    A-V CARDIAC PACEMAKER INSERTION N/A 9/9/2020    Procedure: INSERTION, CARDIAC PACEMAKER, DUAL CHAMBER - BIOTRONIK;  Surgeon: Rian Cramer MD;  Location: [facility] CATH;  Service: Cardiology;  Laterality: N/A;    AUGMENTATION OF BREAST  1989    right only     BREAST BIOPSY Left 1987    benign    BREAST SURGERY Right 1989    implant repair after rupture    BREAST SURGERY Right     implant repair after rupture    CATARACT EXTRACTION W/  INTRAOCULAR LENS IMPLANT      bilateral    COLONOSCOPY  2011    polyp    COLONOSCOPY N/A 1/28/2020    Procedure: COLONOSCOPY;  Surgeon: Papo Akers MD;  Location: [facility] ENDO;  Service: Endoscopy;  Laterality: N/A;    dupuytrens      EYE SURGERY Bilateral     cataracts    MASTECTOMY Right 1986    TONSILLECTOMY         Family History:  Family History   Problem Relation Age of Onset    Cancer Maternal Aunt         colon    Breast cancer Maternal Aunt 50    Cancer Paternal Uncle         colon    Arthritis Mother     Parkinsonism Father     No Known Problems Sister     Henoch-Schonlein purpura Daughter     No Known Problems Son     No Known Problems Sister     Cancer Maternal Aunt         breast       Social History:  Social History     Socioeconomic History     Marital status:    Tobacco Use    Smoking status: Former     Packs/day: 0.10     Years: 24.00     Pack years: 2.40     Types: Cigarettes     Quit date: 1983     Years since quittin.3    Smokeless tobacco: Never    Tobacco comments:     quit 30 years ago   Substance and Sexual Activity    Alcohol use: Yes     Comment: a few days a week    Drug use: No    Sexual activity: Not Currently     Social Determinants of Health     Financial Resource Strain: Low Risk     Difficulty of Paying Living Expenses: Not very hard   Food Insecurity: No Food Insecurity    Worried About Running Out of Food in the Last Year: Never true    Ran Out of Food in the Last Year: Never true   Transportation Needs: No Transportation Needs    Lack of Transportation (Medical): No    Lack of Transportation (Non-Medical): No   Stress: No Stress Concern Present    Feeling of Stress : Not at all   Social Connections: Moderately Isolated    Frequency of Communication with Friends and Family: More than three times a week    Frequency of Social Gatherings with Friends and Family: More than three times a week    Attends Baptism Services: More than 4 times per year    Active Member of Clubs or Organizations: No    Attends Club or Organization Meetings: Never    Marital Status:    Housing Stability: Unknown    Number of Places Lived in the Last Year: 1    Unstable Housing in the Last Year: No       Medications:  Current Outpatient Medications on File Prior to Visit   Medication Sig Dispense Refill    calcitonin, salmon, (FORTICAL) 200 unit/actuation nasal spray One spray in 1 nostril daily 3.7 mL 0    folic acid/multivit-min/lutein (CENTRUM SILVER ORAL) Take 1 capsule by mouth once daily.       OLANZapine (ZYPREXA) 5 MG tablet Take 1 tablet (5 mg total) by mouth every evening. 30 tablet 11    traZODone (DESYREL) 50 MG tablet Take 1 tablet (50 mg total) by mouth every evening. 30 tablet 11     Current Facility-Administered Medications  on File Prior to Visit   Medication Dose Route Frequency Provider Last Rate Last Admin    mupirocin 2 % ointment   Nasal Once Rian Cramer MD           Allergies:  Benadryl allergy decongestant, Mirtazapine, and Sulfamethoxazole-trimethoprim    Immunizations:  Immunization History   Administered Date(s) Administered    COVID-19, MRNA, LN-S, PF (Pfizer) (Purple Cap) 01/06/2021, 01/27/2021, 12/18/2021    Influenza 10/03/2009, 10/04/2010, 11/10/2011    Influenza (FLUAD) - Quadrivalent - Adjuvanted - PF *Preferred* (65+) 10/07/2020    Influenza - High Dose - PF (65 years and older) 10/14/2015, 10/12/2016, 09/28/2017, 10/06/2018, 10/23/2019    Influenza - Trivalent (ADULT) 10/03/2009, 10/04/2010, 11/10/2011, 10/08/2013    Pneumococcal Conjugate - 13 Valent 10/12/2016    Pneumococcal Polysaccharide - 23 Valent 05/15/2018    Zoster Recombinant 03/04/2022       Review of Systems:  Review of Systems   Constitutional:  Negative for activity change and unexpected weight change.   HENT:  Negative for hearing loss, rhinorrhea and trouble swallowing.    Eyes:  Negative for discharge and visual disturbance.   Respiratory:  Negative for chest tightness and wheezing.    Cardiovascular:  Negative for chest pain and palpitations.   Gastrointestinal:  Negative for blood in stool, constipation, diarrhea and vomiting.   Endocrine: Negative for polydipsia and polyuria.   Genitourinary:  Negative for difficulty urinating, dysuria, hematuria and menstrual problem.   Musculoskeletal:  Negative for arthralgias, joint swelling and neck pain.   Neurological:  Positive for weakness. Negative for headaches.   Psychiatric/Behavioral:  Positive for confusion and dysphoric mood.    All other systems reviewed and are negative.    Objective:    Vitals:  There were no vitals filed for this visit.    Physical Exam  Constitutional:       General: She is not in acute distress.     Appearance: She is well-developed.   HENT:      Head:  Normocephalic and atraumatic.   Pulmonary:      Effort: Pulmonary effort is normal. No respiratory distress.   Musculoskeletal:      Cervical back: Normal range of motion.   Psychiatric:         Behavior: Behavior normal.         Thought Content: Thought content normal.         Judgment: Judgment normal.           Grzegorz Gutierrez MD  Family Medicine

## 2022-10-10 NOTE — TELEPHONE ENCOUNTER
More interested in nurse.  Patient has 24 hour help currently.  Will keep with [facility]

## 2022-10-10 NOTE — TELEPHONE ENCOUNTER
Spoke with Uma. States that there is no aide available with [facility] at this time. If that is what the patient needs, may need to refer to different agency if possible.   Please advise.

## 2022-10-10 NOTE — TELEPHONE ENCOUNTER
----- Message from Marilu Smiley sent at 10/10/2022  3:04 PM CDT -----  Contact: Lester with [facility]  Type: Needs Medical Advice    Who Called:  Lester with [facility]     Symptoms (please be specific):  called to find out about the Referral and what kind of assistance the patient will need.        Best Call Back Number: [phone]    Additional Information: Please call back to Lester with [facility].

## 2022-10-11 NOTE — PROGRESS NOTES
THIS DOCUMENT WAS MADE IN PART WITH VOICE RECOGNITION SOFTWARE.  OCCASIONALLY THIS SOFTWARE WILL MISINTERPRET WORDS OR PHRASES.    Assessment and Plan:    1. Anxiety  EScitalopram oxalate (LEXAPRO) 10 MG tablet    Ambulatory referral/consult to Psychiatry      2. Dementia with behavioral disturbance        3. Senile debility        4. Agitation due to dementia  OLANZapine (ZYPREXA) 5 MG tablet    Ambulatory referral/consult to Psychiatry        Start taking Zyprexa b.i.d.  Trazodone 100 mg nightly for insomnia  New medication Lexapro 10 mg   Referred to Psychiatry for further assistance with her agitation and anxiety     Influenza vaccine today     Paperwork for handicap placard     Counseled on COVID vaccine     Virtual visit in 4 weeks for follow-up      ______________________________________________________________________  Subjective:    Chief Complaint:  Chief Complaint   Patient presents with    Follow-up     From  on 10/7-        HPI:  Ladonna is a 86 y.o. year old       Recently seen via virtual visit   We did increase trazodone to 100 mg to treat uncontrolled insomnia relating to dementia/behavioral disturbances     Still having trouble sleeping, daytime agitation is still troublesome   Sundowning apparent     Patient's daughter also reported uncontrolled anxiety during the daytime  Currently on Zyprexa 5 mg daily , taken in the morning time  Previously on fluoxetine which did cause some agitation    Also requesting handicap placard secondary to her significant debility   Patient has high fall risk secondary to her dementia another muscular weaknesses      Dementia with behavioral disturbances including insomnia and agitation  Rx-trazodone 100 mg, Zyprexa 5 mg q.a.m.  Prev med : fluoxetine (agitation) , Remeron (agitation)      GERD  Previous Rx-omeprazole 20 mg (effective)     History of right side breasts cancer  Partial mastectomy in 1986.         Past Medical History:  Past Medical History:    Diagnosis Date    Anxiety     Breast cancer 1986    mastectomy right    GERD (gastroesophageal reflux disease)     Hypertension     Osteoporosis, unspecified        Past Surgical History:  Past Surgical History:   Procedure Laterality Date    A-V CARDIAC PACEMAKER INSERTION N/A 2020    Procedure: INSERTION, CARDIAC PACEMAKER, DUAL CHAMBER - BIOTRONIK;  Surgeon: Rian Cramer MD;  Location: [facility] CATH;  Service: Cardiology;  Laterality: N/A;    AUGMENTATION OF BREAST      right only     BREAST BIOPSY Left 1987    benign    BREAST SURGERY Right 1989    implant repair after rupture    BREAST SURGERY Right     implant repair after rupture    CATARACT EXTRACTION W/  INTRAOCULAR LENS IMPLANT      bilateral    COLONOSCOPY  2011    polyp    COLONOSCOPY N/A 2020    Procedure: COLONOSCOPY;  Surgeon: Papo Akers MD;  Location: [facility] ENDO;  Service: Endoscopy;  Laterality: N/A;    dupuytrens      EYE SURGERY Bilateral     cataracts    MASTECTOMY Right 1986    TONSILLECTOMY         Family History:  Family History   Problem Relation Age of Onset    Cancer Maternal Aunt         colon    Breast cancer Maternal Aunt 50    Cancer Paternal Uncle         colon    Arthritis Mother     Parkinsonism Father     No Known Problems Sister     Henoch-Schonlein purpura Daughter     No Known Problems Son     No Known Problems Sister     Cancer Maternal Aunt         breast       Social History:  Social History     Socioeconomic History    Marital status:    Tobacco Use    Smoking status: Former     Packs/day: 0.10     Years: 24.00     Pack years: 2.40     Types: Cigarettes     Quit date: 1983     Years since quittin.3    Smokeless tobacco: Never    Tobacco comments:     quit 30 years ago   Substance and Sexual Activity    Alcohol use: Yes     Comment: a few days a week    Drug use: No    Sexual activity: Not Currently     Social Determinants of Health     Financial Resource Strain: Low Risk      Difficulty of Paying Living Expenses: Not very hard   Food Insecurity: No Food Insecurity    Worried About Running Out of Food in the Last Year: Never true    Ran Out of Food in the Last Year: Never true   Transportation Needs: No Transportation Needs    Lack of Transportation (Medical): No    Lack of Transportation (Non-Medical): No   Stress: No Stress Concern Present    Feeling of Stress : Not at all   Social Connections: Moderately Isolated    Frequency of Communication with Friends and Family: More than three times a week    Frequency of Social Gatherings with Friends and Family: More than three times a week    Attends [employer] Services: More than 4 times per year    Active Member of Clubs or Organizations: No    Attends Club or Organization Meetings: Never    Marital Status:    Housing Stability: Unknown    Number of Places Lived in the Last Year: 1    Unstable Housing in the Last Year: No       Medications:  Current Outpatient Medications on File Prior to Visit   Medication Sig Dispense Refill    calcitonin, salmon, (FORTICAL) 200 unit/actuation nasal spray One spray in 1 nostril daily 3.7 mL 0    folic acid/multivit-min/lutein (CENTRUM SILVER ORAL) Take 1 capsule by mouth once daily.       OLANZapine (ZYPREXA) 5 MG tablet Take 1 tablet (5 mg total) by mouth every evening. 30 tablet 11    traZODone (DESYREL) 50 MG tablet Take 1 tablet (50 mg total) by mouth every evening. 30 tablet 11     Current Facility-Administered Medications on File Prior to Visit   Medication Dose Route Frequency Provider Last Rate Last Admin    mupirocin 2 % ointment   Nasal Once Rian Cramer MD           Allergies:  Benadryl allergy decongestant, Mirtazapine, and Sulfamethoxazole-trimethoprim    Immunizations:  Immunization History   Administered Date(s) Administered    COVID-19, MRNA, LN-S, PF (Pfizer) (Purple Cap) 01/06/2021, 01/27/2021, 12/18/2021    Influenza 10/03/2009, 10/04/2010, 11/10/2011    Influenza (FLUAD)  "- Quadrivalent - Adjuvanted - PF *Preferred* (65+) 10/07/2020    Influenza - High Dose - PF (65 years and older) 10/14/2015, 10/12/2016, 09/28/2017, 10/06/2018, 10/23/2019    Influenza - Trivalent (ADULT) 10/03/2009, 10/04/2010, 11/10/2011, 10/08/2013    Pneumococcal Conjugate - 13 Valent 10/12/2016    Pneumococcal Polysaccharide - 23 Valent 05/15/2018    Zoster Recombinant 03/04/2022       Review of Systems:  Review of Systems   Constitutional:  Negative for activity change and unexpected weight change.   HENT:  Negative for hearing loss, rhinorrhea and trouble swallowing.    Eyes:  Negative for discharge and visual disturbance.   Respiratory:  Negative for chest tightness and wheezing.    Cardiovascular:  Negative for chest pain and palpitations.   Gastrointestinal:  Negative for blood in stool, constipation, diarrhea and vomiting.   Endocrine: Negative for polydipsia and polyuria.   Genitourinary:  Negative for difficulty urinating, dysuria, hematuria and menstrual problem.   Musculoskeletal:  Negative for arthralgias, joint swelling and neck pain.   Neurological:  Positive for weakness. Negative for headaches.   Psychiatric/Behavioral:  Positive for confusion and dysphoric mood.    All other systems reviewed and are negative.    Objective:    Vitals:  Vitals:    10/11/22 1449   Weight: 52.8 kg (116 lb 6.5 oz)   Height: 5' 9" (1.753 m)   PainSc: 0-No pain       Physical Exam  Vitals reviewed.   Constitutional:       General: She is not in acute distress.  HENT:      Head: Normocephalic and atraumatic.   Eyes:      Pupils: Pupils are equal, round, and reactive to light.   Cardiovascular:      Rate and Rhythm: Normal rate and regular rhythm.      Heart sounds: No murmur heard.    No friction rub.   Pulmonary:      Effort: Pulmonary effort is normal.      Breath sounds: Normal breath sounds.   Abdominal:      General: Bowel sounds are normal. There is no distension.      Palpations: Abdomen is soft.      Tenderness: " There is no abdominal tenderness.   Musculoskeletal:      Cervical back: Neck supple.   Skin:     General: Skin is warm and dry.      Findings: No rash.   Psychiatric:         Behavior: Behavior normal.           Grzegorz Gutierrez MD  Family Medicine

## 2022-10-20 PROBLEM — R94.31 PROLONGED QT INTERVAL: Status: RESOLVED | Noted: 2022-01-01 | Resolved: 2022-01-01

## 2022-10-20 PROBLEM — S82.61XD CLOSED DISP FRACTURE OF RIGHT LATERAL MALLEOLUS WITH ROUTINE HEALING: Status: RESOLVED | Noted: 2021-01-13 | Resolved: 2022-01-01

## 2022-10-20 PROBLEM — S82.899A: Status: RESOLVED | Noted: 2020-12-21 | Resolved: 2022-01-01

## 2022-10-20 PROBLEM — M62.81 MUSCLE WEAKNESS: Status: RESOLVED | Noted: 2018-11-06 | Resolved: 2022-01-01

## 2022-10-20 PROBLEM — R00.1 BRADYCARDIA: Status: RESOLVED | Noted: 2020-09-09 | Resolved: 2022-01-01

## 2022-10-20 PROBLEM — I97.190: Status: RESOLVED | Noted: 2021-03-02 | Resolved: 2022-01-01

## 2022-10-20 PROBLEM — E78.2 MIXED HYPERLIPIDEMIA: Status: RESOLVED | Noted: 2020-08-19 | Resolved: 2022-01-01

## 2022-10-20 PROBLEM — S92.351A DISPLACED FRACTURE OF FIFTH METATARSAL BONE, RIGHT FOOT, INITIAL ENCOUNTER FOR CLOSED FRACTURE: Status: RESOLVED | Noted: 2021-01-13 | Resolved: 2022-01-01

## 2022-10-20 PROBLEM — I44.2: Status: RESOLVED | Noted: 2021-03-02 | Resolved: 2022-01-01
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
## 2022-10-20 PROBLEM — S72.102A CLOSED FRACTURE OF TROCHANTER OF LEFT FEMUR: Status: ACTIVE | Noted: 2022-01-01

## 2022-10-20 PROBLEM — R41.3 MEMORY LOSS: Status: RESOLVED | Noted: 2021-10-25 | Resolved: 2022-01-01

## 2022-10-20 PROBLEM — Z12.11 SCREEN FOR COLON CANCER: Status: RESOLVED | Noted: 2020-01-28 | Resolved: 2022-01-01

## 2022-10-20 PROBLEM — R26.89 DECREASED FUNCTIONAL MOBILITY: Status: RESOLVED | Noted: 2020-07-15 | Resolved: 2022-01-01

## 2022-10-21 PROBLEM — S72.142A CLOSED INTERTROCHANTERIC FRACTURE OF HIP, LEFT, INITIAL ENCOUNTER: Status: ACTIVE | Noted: 2022-01-01

## 2022-10-22 PROBLEM — R33.9 URINARY RETENTION: Status: ACTIVE | Noted: 2022-01-01

## 2022-10-24 PROBLEM — J96.01 ACUTE HYPOXEMIC RESPIRATORY FAILURE: Status: ACTIVE | Noted: 2022-01-01

## 2022-10-25 PROBLEM — E43 SEVERE MALNUTRITION: Status: ACTIVE | Noted: 2022-01-01

## 2022-10-25 PROBLEM — I82.409 ACUTE DVT (DEEP VENOUS THROMBOSIS): Status: ACTIVE | Noted: 2022-01-01

## 2022-10-25 PROBLEM — G93.40 ACUTE ENCEPHALOPATHY: Status: ACTIVE | Noted: 2022-01-01

## 2022-10-27 PROBLEM — I63.511 ACUTE ISCHEMIC RIGHT MIDDLE CEREBRAL ARTERY (MCA) STROKE: Status: ACTIVE | Noted: 2022-01-01

## 2022-10-27 PROBLEM — F03.90 DEMENTIA: Status: ACTIVE | Noted: 2022-01-01
